# Patient Record
Sex: FEMALE | Race: WHITE | NOT HISPANIC OR LATINO | Employment: FULL TIME | URBAN - METROPOLITAN AREA
[De-identification: names, ages, dates, MRNs, and addresses within clinical notes are randomized per-mention and may not be internally consistent; named-entity substitution may affect disease eponyms.]

---

## 2017-01-26 ENCOUNTER — AMBULATORY - HEALTHEAST (OUTPATIENT)
Dept: ALLERGY | Facility: CLINIC | Age: 52
End: 2017-01-26

## 2017-01-26 DIAGNOSIS — J30.1 SEASONAL ALLERGIC RHINITIS DUE TO POLLEN: ICD-10-CM

## 2017-01-26 DIAGNOSIS — J30.81 ALLERGIC RHINITIS DUE TO ANIMAL (CAT) (DOG) HAIR AND DANDER: ICD-10-CM

## 2017-02-16 ENCOUNTER — COMMUNICATION - HEALTHEAST (OUTPATIENT)
Dept: ALLERGY | Facility: CLINIC | Age: 52
End: 2017-02-16

## 2017-02-16 ENCOUNTER — AMBULATORY - HEALTHEAST (OUTPATIENT)
Dept: ALLERGY | Facility: CLINIC | Age: 52
End: 2017-02-16

## 2017-02-16 DIAGNOSIS — J30.1 SEASONAL ALLERGIC RHINITIS DUE TO POLLEN: ICD-10-CM

## 2017-02-16 DIAGNOSIS — J30.9 ALLERGIC RHINITIS: ICD-10-CM

## 2017-03-09 ENCOUNTER — AMBULATORY - HEALTHEAST (OUTPATIENT)
Dept: ALLERGY | Facility: CLINIC | Age: 52
End: 2017-03-09

## 2017-03-09 DIAGNOSIS — J30.89 SEASONAL ALLERGIC RHINITIS DUE TO OTHER ALLERGIC TRIGGER: ICD-10-CM

## 2017-03-15 ENCOUNTER — COMMUNICATION - HEALTHEAST (OUTPATIENT)
Dept: FAMILY MEDICINE | Facility: CLINIC | Age: 52
End: 2017-03-15

## 2017-03-15 ENCOUNTER — COMMUNICATION - HEALTHEAST (OUTPATIENT)
Dept: ALLERGY | Facility: CLINIC | Age: 52
End: 2017-03-15

## 2017-03-15 DIAGNOSIS — J30.9 ALLERGIC RHINITIS: ICD-10-CM

## 2017-03-17 ENCOUNTER — COMMUNICATION - HEALTHEAST (OUTPATIENT)
Dept: SCHEDULING | Facility: CLINIC | Age: 52
End: 2017-03-17

## 2017-03-17 ENCOUNTER — OFFICE VISIT - HEALTHEAST (OUTPATIENT)
Dept: INTERNAL MEDICINE | Facility: CLINIC | Age: 52
End: 2017-03-17

## 2017-03-17 DIAGNOSIS — R50.9 FEVER: ICD-10-CM

## 2017-03-17 DIAGNOSIS — J10.1 INFLUENZA B: ICD-10-CM

## 2017-03-17 DIAGNOSIS — J45.909 ASTHMA: ICD-10-CM

## 2017-03-30 ENCOUNTER — AMBULATORY - HEALTHEAST (OUTPATIENT)
Dept: ALLERGY | Facility: CLINIC | Age: 52
End: 2017-03-30

## 2017-03-30 DIAGNOSIS — J30.81 ALLERGIC RHINITIS DUE TO ANIMAL (CAT) (DOG) HAIR AND DANDER: ICD-10-CM

## 2017-04-25 ENCOUNTER — AMBULATORY - HEALTHEAST (OUTPATIENT)
Dept: ALLERGY | Facility: CLINIC | Age: 52
End: 2017-04-25

## 2017-04-25 DIAGNOSIS — J30.1 SEASONAL ALLERGIC RHINITIS DUE TO POLLEN: ICD-10-CM

## 2017-04-25 DIAGNOSIS — J30.81 ALLERGIC RHINITIS DUE TO ANIMAL (CAT) (DOG) HAIR AND DANDER: ICD-10-CM

## 2017-05-09 ENCOUNTER — COMMUNICATION - HEALTHEAST (OUTPATIENT)
Dept: ALLERGY | Facility: CLINIC | Age: 52
End: 2017-05-09

## 2017-05-09 DIAGNOSIS — J30.9 ALLERGIC RHINITIS: ICD-10-CM

## 2017-05-18 ENCOUNTER — AMBULATORY - HEALTHEAST (OUTPATIENT)
Dept: ALLERGY | Facility: CLINIC | Age: 52
End: 2017-05-18

## 2017-05-18 DIAGNOSIS — J30.81 ALLERGIC RHINITIS DUE TO ANIMAL (CAT) (DOG) HAIR AND DANDER: ICD-10-CM

## 2017-05-18 DIAGNOSIS — J30.1 SEASONAL ALLERGIC RHINITIS DUE TO POLLEN: ICD-10-CM

## 2017-06-13 ENCOUNTER — AMBULATORY - HEALTHEAST (OUTPATIENT)
Dept: ALLERGY | Facility: CLINIC | Age: 52
End: 2017-06-13

## 2017-06-13 DIAGNOSIS — J30.89 SEASONAL ALLERGIC RHINITIS DUE TO OTHER ALLERGIC TRIGGER: ICD-10-CM

## 2017-06-13 DIAGNOSIS — J30.1 SEASONAL ALLERGIC RHINITIS DUE TO POLLEN: ICD-10-CM

## 2017-06-13 DIAGNOSIS — J30.81 ALLERGIC RHINITIS DUE TO ANIMAL (CAT) (DOG) HAIR AND DANDER: ICD-10-CM

## 2017-07-11 ENCOUNTER — AMBULATORY - HEALTHEAST (OUTPATIENT)
Dept: ALLERGY | Facility: CLINIC | Age: 52
End: 2017-07-11

## 2017-07-11 DIAGNOSIS — J30.89 SEASONAL ALLERGIC RHINITIS DUE TO OTHER ALLERGIC TRIGGER: ICD-10-CM

## 2017-07-25 ENCOUNTER — AMBULATORY - HEALTHEAST (OUTPATIENT)
Dept: ALLERGY | Facility: CLINIC | Age: 52
End: 2017-07-25

## 2017-07-25 DIAGNOSIS — J30.81 ALLERGIC RHINITIS DUE TO ANIMAL (CAT) (DOG) HAIR AND DANDER: ICD-10-CM

## 2017-07-25 DIAGNOSIS — J30.89 SEASONAL ALLERGIC RHINITIS DUE TO OTHER ALLERGIC TRIGGER: ICD-10-CM

## 2017-07-25 DIAGNOSIS — J30.1 SEASONAL ALLERGIC RHINITIS DUE TO POLLEN: ICD-10-CM

## 2017-08-03 ENCOUNTER — COMMUNICATION - HEALTHEAST (OUTPATIENT)
Dept: SCHEDULING | Facility: CLINIC | Age: 52
End: 2017-08-03

## 2017-08-09 ENCOUNTER — OFFICE VISIT - HEALTHEAST (OUTPATIENT)
Dept: FAMILY MEDICINE | Facility: CLINIC | Age: 52
End: 2017-08-09

## 2017-08-09 DIAGNOSIS — K21.9 ESOPHAGEAL REFLUX: ICD-10-CM

## 2017-08-09 DIAGNOSIS — R07.89 ATYPICAL CHEST PAIN: ICD-10-CM

## 2017-08-09 DIAGNOSIS — Z12.31 VISIT FOR SCREENING MAMMOGRAM: ICD-10-CM

## 2017-08-09 DIAGNOSIS — R53.83 FATIGUE: ICD-10-CM

## 2017-08-09 DIAGNOSIS — J45.909 ASTHMA: ICD-10-CM

## 2017-08-09 LAB
ATRIAL RATE - MUSE: 76 BPM
CHOLEST SERPL-MCNC: 232 MG/DL
DIASTOLIC BLOOD PRESSURE - MUSE: NORMAL MMHG
FASTING STATUS PATIENT QL REPORTED: NO
HDLC SERPL-MCNC: 60 MG/DL
INTERPRETATION ECG - MUSE: NORMAL
LDLC SERPL CALC-MCNC: 131 MG/DL
P AXIS - MUSE: 54 DEGREES
PR INTERVAL - MUSE: 142 MS
QRS DURATION - MUSE: 82 MS
QT - MUSE: 396 MS
QTC - MUSE: 445 MS
R AXIS - MUSE: 18 DEGREES
SYSTOLIC BLOOD PRESSURE - MUSE: NORMAL MMHG
T AXIS - MUSE: 33 DEGREES
TRIGL SERPL-MCNC: 203 MG/DL
VENTRICULAR RATE- MUSE: 76 BPM

## 2017-08-17 ENCOUNTER — AMBULATORY - HEALTHEAST (OUTPATIENT)
Dept: ALLERGY | Facility: CLINIC | Age: 52
End: 2017-08-17

## 2017-08-17 DIAGNOSIS — J30.1 SEASONAL ALLERGIC RHINITIS DUE TO POLLEN: ICD-10-CM

## 2017-08-17 DIAGNOSIS — J30.81 ALLERGIC RHINITIS DUE TO ANIMAL (CAT) (DOG) HAIR AND DANDER: ICD-10-CM

## 2017-08-18 ENCOUNTER — HOSPITAL ENCOUNTER (OUTPATIENT)
Dept: MAMMOGRAPHY | Facility: HOSPITAL | Age: 52
Discharge: HOME | End: 2017-08-18
Attending: FAMILY MEDICINE

## 2017-08-18 DIAGNOSIS — Z12.31 VISIT FOR SCREENING MAMMOGRAM: ICD-10-CM

## 2017-08-21 ENCOUNTER — COMMUNICATION - HEALTHEAST (OUTPATIENT)
Dept: FAMILY MEDICINE | Facility: CLINIC | Age: 52
End: 2017-08-21

## 2017-08-21 DIAGNOSIS — R07.9 CHEST PAIN: ICD-10-CM

## 2017-08-29 ENCOUNTER — HOSPITAL ENCOUNTER (OUTPATIENT)
Dept: NUCLEAR MEDICINE | Facility: HOSPITAL | Age: 52
Discharge: HOME | End: 2017-08-29
Attending: FAMILY MEDICINE

## 2017-08-29 ENCOUNTER — HOSPITAL ENCOUNTER (OUTPATIENT)
Dept: CARDIOLOGY | Facility: HOSPITAL | Age: 52
Discharge: HOME | End: 2017-08-29
Attending: FAMILY MEDICINE

## 2017-08-29 DIAGNOSIS — R07.9 CHEST PAIN: ICD-10-CM

## 2017-08-29 LAB
CV STRESS CURRENT BP HE: NORMAL
CV STRESS CURRENT HR HE: 102
CV STRESS CURRENT HR HE: 106
CV STRESS CURRENT HR HE: 114
CV STRESS CURRENT HR HE: 116
CV STRESS CURRENT HR HE: 117
CV STRESS CURRENT HR HE: 118
CV STRESS CURRENT HR HE: 125
CV STRESS CURRENT HR HE: 129
CV STRESS CURRENT HR HE: 129
CV STRESS CURRENT HR HE: 136
CV STRESS CURRENT HR HE: 136
CV STRESS CURRENT HR HE: 140
CV STRESS CURRENT HR HE: 141
CV STRESS CURRENT HR HE: 144
CV STRESS CURRENT HR HE: 145
CV STRESS CURRENT HR HE: 149
CV STRESS CURRENT HR HE: 152
CV STRESS CURRENT HR HE: 153
CV STRESS CURRENT HR HE: 85
CV STRESS CURRENT HR HE: 92
CV STRESS CURRENT HR HE: 94
CV STRESS CURRENT HR HE: 96
CV STRESS CURRENT HR HE: 96
CV STRESS CURRENT HR HE: 97
CV STRESS DEVIATION TIME HE: NORMAL
CV STRESS ECHO PERCENT HR HE: NORMAL
CV STRESS EXERCISE STAGE HE: NORMAL
CV STRESS EXERCISE STAGE REACHED HE: NORMAL
CV STRESS FINAL RESTING BP HE: NORMAL
CV STRESS FINAL RESTING HR HE: 96
CV STRESS MAX HR HE: 152
CV STRESS MAX TREADMILL GRADE HE: 14
CV STRESS MAX TREADMILL SPEED HE: 3.4
CV STRESS PEAK DIA BP HE: NORMAL
CV STRESS PEAK SYS BP HE: NORMAL
CV STRESS PHASE HE: NORMAL
CV STRESS PROTOCOL HE: NORMAL
CV STRESS RESTING PT POSITION HE: NORMAL
CV STRESS RESTING PT POSITION HE: NORMAL
CV STRESS ST DEVIATION AMOUNT HE: NORMAL
CV STRESS ST DEVIATION ELEVATION HE: NORMAL
CV STRESS ST EVELATION AMOUNT HE: NORMAL
CV STRESS TEST TYPE HE: NORMAL
CV STRESS TOTAL STAGE TIME MIN 1 HE: NORMAL
NUC STRESS EJECTION FRACTION: 70 %
STRESS ECHO BASELINE BP: NORMAL
STRESS ECHO BASELINE HR: 85
STRESS ECHO CALCULATED PERCENT HR: 89 %
STRESS ECHO LAST STRESS BP: NORMAL
STRESS ECHO LAST STRESS HR: 152
STRESS ECHO POST ESTIMATED WORKLOAD: 10.3
STRESS ECHO POST EXERCISE DUR MIN: 8
STRESS ECHO POST EXERCISE DUR SEC: 59
STRESS ECHO TARGET HR: 145

## 2017-08-29 ASSESSMENT — MIFFLIN-ST. JEOR: SCORE: 1451.84

## 2017-08-30 ENCOUNTER — COMMUNICATION - HEALTHEAST (OUTPATIENT)
Dept: FAMILY MEDICINE | Facility: CLINIC | Age: 52
End: 2017-08-30

## 2017-08-30 DIAGNOSIS — R10.13 EPIGASTRIC ABDOMINAL PAIN: ICD-10-CM

## 2017-08-30 DIAGNOSIS — R07.9 CHEST PAIN: ICD-10-CM

## 2017-08-30 DIAGNOSIS — K21.9 GERD (GASTROESOPHAGEAL REFLUX DISEASE): ICD-10-CM

## 2017-09-12 ENCOUNTER — AMBULATORY - HEALTHEAST (OUTPATIENT)
Dept: ALLERGY | Facility: CLINIC | Age: 52
End: 2017-09-12

## 2017-09-12 DIAGNOSIS — J30.1 SEASONAL ALLERGIC RHINITIS DUE TO POLLEN: ICD-10-CM

## 2017-09-12 DIAGNOSIS — J30.81 ALLERGIC RHINITIS DUE TO ANIMAL (CAT) (DOG) HAIR AND DANDER: ICD-10-CM

## 2017-09-28 ENCOUNTER — COMMUNICATION - HEALTHEAST (OUTPATIENT)
Dept: FAMILY MEDICINE | Facility: CLINIC | Age: 52
End: 2017-09-28

## 2017-09-28 DIAGNOSIS — J45.20 MILD INTERMITTENT ASTHMA: ICD-10-CM

## 2017-10-05 ENCOUNTER — AMBULATORY - HEALTHEAST (OUTPATIENT)
Dept: ALLERGY | Facility: CLINIC | Age: 52
End: 2017-10-05

## 2017-10-05 DIAGNOSIS — J30.1 CHRONIC SEASONAL ALLERGIC RHINITIS DUE TO POLLEN: ICD-10-CM

## 2017-10-05 DIAGNOSIS — J30.81 CHRONIC ALLERGIC RHINITIS DUE TO ANIMAL HAIR AND DANDER: ICD-10-CM

## 2017-10-09 ENCOUNTER — COMMUNICATION - HEALTHEAST (OUTPATIENT)
Dept: FAMILY MEDICINE | Facility: CLINIC | Age: 52
End: 2017-10-09

## 2017-10-09 DIAGNOSIS — K21.9 GERD (GASTROESOPHAGEAL REFLUX DISEASE): ICD-10-CM

## 2017-10-09 DIAGNOSIS — E66.9 OBESITY (BMI 35.0-39.9 WITHOUT COMORBIDITY): ICD-10-CM

## 2017-10-31 ENCOUNTER — AMBULATORY - HEALTHEAST (OUTPATIENT)
Dept: ALLERGY | Facility: CLINIC | Age: 52
End: 2017-10-31

## 2017-10-31 DIAGNOSIS — J30.9 ALLERGIC RHINITIS: ICD-10-CM

## 2017-11-16 ENCOUNTER — OFFICE VISIT - HEALTHEAST (OUTPATIENT)
Dept: ALLERGY | Facility: CLINIC | Age: 52
End: 2017-11-16

## 2017-11-16 DIAGNOSIS — J30.1 CHRONIC SEASONAL ALLERGIC RHINITIS DUE TO POLLEN: ICD-10-CM

## 2017-11-16 DIAGNOSIS — L50.9 URTICARIA: ICD-10-CM

## 2017-11-16 DIAGNOSIS — J45.20 MILD INTERMITTENT ASTHMA WITHOUT COMPLICATION: ICD-10-CM

## 2018-01-03 ENCOUNTER — COMMUNICATION - HEALTHEAST (OUTPATIENT)
Dept: FAMILY MEDICINE | Facility: CLINIC | Age: 53
End: 2018-01-03

## 2018-01-03 ENCOUNTER — COMMUNICATION - HEALTHEAST (OUTPATIENT)
Dept: SCHEDULING | Facility: CLINIC | Age: 53
End: 2018-01-03

## 2018-01-03 DIAGNOSIS — J45.909 ASTHMA: ICD-10-CM

## 2018-01-04 ENCOUNTER — COMMUNICATION - HEALTHEAST (OUTPATIENT)
Dept: SCHEDULING | Facility: CLINIC | Age: 53
End: 2018-01-04

## 2018-01-04 ENCOUNTER — OFFICE VISIT - HEALTHEAST (OUTPATIENT)
Dept: FAMILY MEDICINE | Facility: CLINIC | Age: 53
End: 2018-01-04

## 2018-01-04 DIAGNOSIS — J45.909 ASTHMA: ICD-10-CM

## 2018-01-04 DIAGNOSIS — J06.9 VIRAL URI WITH COUGH: ICD-10-CM

## 2018-01-04 ASSESSMENT — MIFFLIN-ST. JEOR: SCORE: 1456.37

## 2018-01-09 ENCOUNTER — COMMUNICATION - HEALTHEAST (OUTPATIENT)
Dept: FAMILY MEDICINE | Facility: CLINIC | Age: 53
End: 2018-01-09

## 2018-01-09 DIAGNOSIS — K21.9 GERD (GASTROESOPHAGEAL REFLUX DISEASE): ICD-10-CM

## 2018-02-21 ENCOUNTER — COMMUNICATION - HEALTHEAST (OUTPATIENT)
Dept: FAMILY MEDICINE | Facility: CLINIC | Age: 53
End: 2018-02-21

## 2018-02-23 ENCOUNTER — OFFICE VISIT - HEALTHEAST (OUTPATIENT)
Dept: FAMILY MEDICINE | Facility: CLINIC | Age: 53
End: 2018-02-23

## 2018-02-23 DIAGNOSIS — R05.9 COUGH: ICD-10-CM

## 2018-02-23 DIAGNOSIS — K44.9 HIATAL HERNIA: ICD-10-CM

## 2018-02-23 DIAGNOSIS — J45.901 ASTHMA EXACERBATION: ICD-10-CM

## 2018-03-19 ENCOUNTER — HOSPITAL ENCOUNTER (OUTPATIENT)
Dept: NUTRITION | Facility: HOSPITAL | Age: 53
Discharge: HOME | End: 2018-03-19
Attending: FAMILY MEDICINE

## 2018-03-19 DIAGNOSIS — E66.9 OBESITY (BMI 35.0-39.9 WITHOUT COMORBIDITY): ICD-10-CM

## 2018-03-19 DIAGNOSIS — K21.9 GERD (GASTROESOPHAGEAL REFLUX DISEASE): ICD-10-CM

## 2018-04-09 ENCOUNTER — COMMUNICATION - HEALTHEAST (OUTPATIENT)
Dept: FAMILY MEDICINE | Facility: CLINIC | Age: 53
End: 2018-04-09

## 2018-04-09 DIAGNOSIS — K21.9 GERD (GASTROESOPHAGEAL REFLUX DISEASE): ICD-10-CM

## 2018-04-20 ENCOUNTER — COMMUNICATION - HEALTHEAST (OUTPATIENT)
Dept: FAMILY MEDICINE | Facility: CLINIC | Age: 53
End: 2018-04-20

## 2018-04-20 DIAGNOSIS — K21.9 GERD (GASTROESOPHAGEAL REFLUX DISEASE): ICD-10-CM

## 2018-04-20 DIAGNOSIS — K44.9 HIATAL HERNIA: ICD-10-CM

## 2018-05-03 ENCOUNTER — RECORDS - HEALTHEAST (OUTPATIENT)
Dept: ADMINISTRATIVE | Facility: OTHER | Age: 53
End: 2018-05-03

## 2018-07-07 ENCOUNTER — RECORDS - HEALTHEAST (OUTPATIENT)
Dept: ADMINISTRATIVE | Facility: OTHER | Age: 53
End: 2018-07-07

## 2018-07-16 ENCOUNTER — COMMUNICATION - HEALTHEAST (OUTPATIENT)
Dept: FAMILY MEDICINE | Facility: CLINIC | Age: 53
End: 2018-07-16

## 2018-07-19 ENCOUNTER — OFFICE VISIT - HEALTHEAST (OUTPATIENT)
Dept: FAMILY MEDICINE | Facility: CLINIC | Age: 53
End: 2018-07-19

## 2018-07-19 DIAGNOSIS — H10.45 OTHER CHRONIC ALLERGIC CONJUNCTIVITIS: ICD-10-CM

## 2018-07-19 DIAGNOSIS — Z01.818 PREOPERATIVE CLEARANCE: ICD-10-CM

## 2018-07-19 DIAGNOSIS — J45.20 MILD INTERMITTENT ASTHMA WITHOUT COMPLICATION: ICD-10-CM

## 2018-07-19 DIAGNOSIS — J30.9 ALLERGIC RHINITIS: ICD-10-CM

## 2018-07-19 DIAGNOSIS — K21.9 GERD (GASTROESOPHAGEAL REFLUX DISEASE): ICD-10-CM

## 2018-07-19 DIAGNOSIS — K44.9 PARAESOPHAGEAL HERNIA: ICD-10-CM

## 2018-07-19 LAB
ANION GAP SERPL CALCULATED.3IONS-SCNC: 9 MMOL/L (ref 5–18)
ATRIAL RATE - MUSE: 72 BPM
BUN SERPL-MCNC: 17 MG/DL (ref 8–22)
CALCIUM SERPL-MCNC: 9.8 MG/DL (ref 8.5–10.5)
CHLORIDE BLD-SCNC: 107 MMOL/L (ref 98–107)
CO2 SERPL-SCNC: 25 MMOL/L (ref 22–31)
CREAT SERPL-MCNC: 0.77 MG/DL (ref 0.6–1.1)
DIASTOLIC BLOOD PRESSURE - MUSE: NORMAL MMHG
ERYTHROCYTE [DISTWIDTH] IN BLOOD BY AUTOMATED COUNT: 15.8 % (ref 11–14.5)
GFR SERPL CREATININE-BSD FRML MDRD: >60 ML/MIN/1.73M2
GLUCOSE BLD-MCNC: 107 MG/DL (ref 70–125)
HCT VFR BLD AUTO: 35 % (ref 35–47)
HGB BLD-MCNC: 11.6 G/DL (ref 12–16)
INTERPRETATION ECG - MUSE: NORMAL
MCH RBC QN AUTO: 26.6 PG (ref 27–34)
MCHC RBC AUTO-ENTMCNC: 33.2 G/DL (ref 32–36)
MCV RBC AUTO: 80 FL (ref 80–100)
P AXIS - MUSE: 34 DEGREES
PLATELET # BLD AUTO: 322 THOU/UL (ref 140–440)
PMV BLD AUTO: 7.6 FL (ref 7–10)
POTASSIUM BLD-SCNC: 4.4 MMOL/L (ref 3.5–5)
PR INTERVAL - MUSE: 146 MS
QRS DURATION - MUSE: 80 MS
QT - MUSE: 394 MS
QTC - MUSE: 431 MS
R AXIS - MUSE: 21 DEGREES
RBC # BLD AUTO: 4.37 MILL/UL (ref 3.8–5.4)
SODIUM SERPL-SCNC: 141 MMOL/L (ref 136–145)
SYSTOLIC BLOOD PRESSURE - MUSE: NORMAL MMHG
T AXIS - MUSE: 30 DEGREES
VENTRICULAR RATE- MUSE: 72 BPM
WBC: 5.1 THOU/UL (ref 4–11)

## 2018-07-19 ASSESSMENT — MIFFLIN-ST. JEOR: SCORE: 1462.33

## 2018-07-26 ENCOUNTER — RECORDS - HEALTHEAST (OUTPATIENT)
Dept: ADMINISTRATIVE | Facility: OTHER | Age: 53
End: 2018-07-26

## 2018-08-01 ENCOUNTER — COMMUNICATION - HEALTHEAST (OUTPATIENT)
Dept: FAMILY MEDICINE | Facility: CLINIC | Age: 53
End: 2018-08-01

## 2018-09-30 ENCOUNTER — COMMUNICATION - HEALTHEAST (OUTPATIENT)
Dept: FAMILY MEDICINE | Facility: CLINIC | Age: 53
End: 2018-09-30

## 2018-09-30 DIAGNOSIS — J45.20 MILD INTERMITTENT ASTHMA: ICD-10-CM

## 2019-02-13 ENCOUNTER — OFFICE VISIT - HEALTHEAST (OUTPATIENT)
Dept: FAMILY MEDICINE | Facility: CLINIC | Age: 54
End: 2019-02-13

## 2019-02-13 DIAGNOSIS — J45.20 MILD INTERMITTENT ASTHMA WITHOUT COMPLICATION: ICD-10-CM

## 2019-02-13 DIAGNOSIS — Z71.84 COUNSELING ABOUT TRAVEL: ICD-10-CM

## 2019-02-13 DIAGNOSIS — Z00.00 HEALTH CARE MAINTENANCE: ICD-10-CM

## 2019-02-14 LAB
HBV SURFACE AB SERPL IA-ACNC: NEGATIVE M[IU]/ML
RUBV IGG SERPL QL IA: POSITIVE

## 2019-02-15 ENCOUNTER — COMMUNICATION - HEALTHEAST (OUTPATIENT)
Dept: FAMILY MEDICINE | Facility: CLINIC | Age: 54
End: 2019-02-15

## 2019-02-15 LAB
MEV IGG SER IA-ACNC: POSITIVE
MUV IGG SER QL IA: POSITIVE

## 2019-02-19 ENCOUNTER — AMBULATORY - HEALTHEAST (OUTPATIENT)
Dept: NURSING | Facility: CLINIC | Age: 54
End: 2019-02-19

## 2019-09-17 ENCOUNTER — COMMUNICATION - HEALTHEAST (OUTPATIENT)
Dept: FAMILY MEDICINE | Facility: CLINIC | Age: 54
End: 2019-09-17

## 2019-09-17 DIAGNOSIS — J45.20 MILD INTERMITTENT ASTHMA: ICD-10-CM

## 2020-01-07 ENCOUNTER — COMMUNICATION - HEALTHEAST (OUTPATIENT)
Dept: ALLERGY | Facility: CLINIC | Age: 55
End: 2020-01-07

## 2020-01-07 DIAGNOSIS — J30.9 ALLERGIC RHINITIS: ICD-10-CM

## 2020-01-13 ENCOUNTER — COMMUNICATION - HEALTHEAST (OUTPATIENT)
Dept: FAMILY MEDICINE | Facility: CLINIC | Age: 55
End: 2020-01-13

## 2020-01-13 DIAGNOSIS — J30.9 ALLERGIC RHINITIS: ICD-10-CM

## 2020-03-29 ENCOUNTER — COMMUNICATION - HEALTHEAST (OUTPATIENT)
Dept: FAMILY MEDICINE | Facility: CLINIC | Age: 55
End: 2020-03-29

## 2020-03-29 DIAGNOSIS — J45.909 ASTHMA: ICD-10-CM

## 2020-03-30 RX ORDER — ALBUTEROL SULFATE 0.83 MG/ML
2.5 SOLUTION RESPIRATORY (INHALATION) EVERY 6 HOURS PRN
Qty: 60 VIAL | Refills: 1 | Status: SHIPPED | OUTPATIENT
Start: 2020-03-30 | End: 2023-07-21

## 2020-06-07 ENCOUNTER — OFFICE VISIT - HEALTHEAST (OUTPATIENT)
Dept: FAMILY MEDICINE | Facility: CLINIC | Age: 55
End: 2020-06-07

## 2020-06-07 DIAGNOSIS — N39.0 ACUTE UTI (URINARY TRACT INFECTION): ICD-10-CM

## 2020-10-14 ENCOUNTER — OFFICE VISIT - HEALTHEAST (OUTPATIENT)
Dept: FAMILY MEDICINE | Facility: CLINIC | Age: 55
End: 2020-10-14

## 2020-10-14 DIAGNOSIS — N39.0 ACUTE UTI (URINARY TRACT INFECTION): ICD-10-CM

## 2020-10-21 ENCOUNTER — VIRTUAL VISIT (OUTPATIENT)
Dept: FAMILY MEDICINE | Facility: OTHER | Age: 55
End: 2020-10-21

## 2020-10-21 ENCOUNTER — COMMUNICATION - HEALTHEAST (OUTPATIENT)
Dept: FAMILY MEDICINE | Facility: CLINIC | Age: 55
End: 2020-10-21

## 2020-10-22 ENCOUNTER — COMMUNICATION - HEALTHEAST (OUTPATIENT)
Dept: FAMILY MEDICINE | Facility: CLINIC | Age: 55
End: 2020-10-22

## 2020-10-22 ENCOUNTER — OFFICE VISIT - HEALTHEAST (OUTPATIENT)
Dept: FAMILY MEDICINE | Facility: CLINIC | Age: 55
End: 2020-10-22

## 2020-10-22 DIAGNOSIS — J45.20 MILD INTERMITTENT ASTHMA WITHOUT COMPLICATION: ICD-10-CM

## 2020-10-22 DIAGNOSIS — Z20.822 SUSPECTED COVID-19 VIRUS INFECTION: ICD-10-CM

## 2020-10-23 ENCOUNTER — AMBULATORY - HEALTHEAST (OUTPATIENT)
Dept: FAMILY MEDICINE | Facility: CLINIC | Age: 55
End: 2020-10-23

## 2020-10-23 DIAGNOSIS — Z20.822 SUSPECTED COVID-19 VIRUS INFECTION: ICD-10-CM

## 2020-10-24 ENCOUNTER — COMMUNICATION - HEALTHEAST (OUTPATIENT)
Dept: SCHEDULING | Facility: CLINIC | Age: 55
End: 2020-10-24

## 2020-12-17 ENCOUNTER — COMMUNICATION - HEALTHEAST (OUTPATIENT)
Dept: FAMILY MEDICINE | Facility: CLINIC | Age: 55
End: 2020-12-17

## 2020-12-17 DIAGNOSIS — J45.20 MILD INTERMITTENT ASTHMA: ICD-10-CM

## 2020-12-18 RX ORDER — MONTELUKAST SODIUM 10 MG/1
TABLET ORAL
Qty: 90 TABLET | Refills: 3 | Status: SHIPPED | OUTPATIENT
Start: 2020-12-18 | End: 2021-12-07

## 2021-02-16 ENCOUNTER — COMMUNICATION - HEALTHEAST (OUTPATIENT)
Dept: FAMILY MEDICINE | Facility: CLINIC | Age: 56
End: 2021-02-16

## 2021-02-16 DIAGNOSIS — J30.9 ALLERGIC RHINITIS: ICD-10-CM

## 2021-02-16 RX ORDER — OLOPATADINE HYDROCHLORIDE 1 MG/ML
SOLUTION/ DROPS OPHTHALMIC
Qty: 5 ML | Refills: 1 | Status: SHIPPED | OUTPATIENT
Start: 2021-02-16

## 2021-03-12 ENCOUNTER — COMMUNICATION - HEALTHEAST (OUTPATIENT)
Dept: FAMILY MEDICINE | Facility: CLINIC | Age: 56
End: 2021-03-12

## 2021-04-19 ENCOUNTER — COMMUNICATION - HEALTHEAST (OUTPATIENT)
Dept: FAMILY MEDICINE | Facility: CLINIC | Age: 56
End: 2021-04-19

## 2021-05-24 ENCOUNTER — RECORDS - HEALTHEAST (OUTPATIENT)
Dept: ADMINISTRATIVE | Facility: CLINIC | Age: 56
End: 2021-05-24

## 2021-05-30 VITALS — BODY MASS INDEX: 35.25 KG/M2 | WEIGHT: 199 LBS

## 2021-05-31 VITALS — WEIGHT: 198 LBS | BODY MASS INDEX: 36.21 KG/M2

## 2021-05-31 VITALS — HEIGHT: 62 IN | WEIGHT: 197 LBS | BODY MASS INDEX: 36.25 KG/M2

## 2021-05-31 VITALS — HEIGHT: 62 IN | WEIGHT: 198 LBS | BODY MASS INDEX: 36.44 KG/M2

## 2021-05-31 VITALS — BODY MASS INDEX: 34.94 KG/M2 | WEIGHT: 197.25 LBS

## 2021-06-01 VITALS — WEIGHT: 199.31 LBS | BODY MASS INDEX: 36.68 KG/M2 | HEIGHT: 62 IN

## 2021-06-01 VITALS — BODY MASS INDEX: 36.29 KG/M2 | WEIGHT: 198.44 LBS

## 2021-06-01 NOTE — TELEPHONE ENCOUNTER
RN cannot approve Refill Request    RN can NOT refill this medication med is not covered by policy/route to provider     . Last office visit: 2/13/2019 Sarah Santiago MD Last Physical: 7/19/2018 Last MTM visit: Visit date not found Last visit same specialty: 2/13/2019 Sarah Santiago MD.  Next visit within 3 mo: Visit date not found  Next physical within 3 mo: Visit date not found      Patsy Ibarra, [facility] Triage/Med Refill 9/17/2019    Requested Prescriptions   Pending Prescriptions Disp Refills     montelukast (SINGULAIR) 10 mg tablet [Pharmacy Med Name: MONTELUKAST SOD 10 MG TABLET] 90 tablet 3     Sig: TAKE ONE TABLET BY MOUTH ONE TIME DAILY       There is no refill protocol information for this order

## 2021-06-02 VITALS — BODY MASS INDEX: 36.44 KG/M2 | WEIGHT: 199.25 LBS

## 2021-06-08 ENCOUNTER — COMMUNICATION - HEALTHEAST (OUTPATIENT)
Dept: FAMILY MEDICINE | Facility: CLINIC | Age: 56
End: 2021-06-08

## 2021-06-08 DIAGNOSIS — J45.909 ASTHMA: ICD-10-CM

## 2021-06-09 RX ORDER — ALBUTEROL SULFATE 90 UG/1
AEROSOL, METERED RESPIRATORY (INHALATION)
Qty: 8.5 G | Refills: 5 | Status: SHIPPED | OUTPATIENT
Start: 2021-06-09 | End: 2023-07-21

## 2021-06-10 NOTE — PROGRESS NOTES
RW  1:1 V/V EXP 5/18/2018  POLLENS/CAT  1:1 V/V EXP 5/18/2018  TREES  1:1 V/V EXP 5/18/2018    HE BULMAROW  CHECKED BY [place]  CHARGED 30 UNITS

## 2021-06-12 NOTE — PROGRESS NOTES
"Jocelyn Quiroga is a 55 y.o. female who is being evaluated via a billable telephone visit.      The patient has been notified of following:     \"This telephone visit will be conducted via a call between you and your physician/provider. We have found that certain health care needs can be provided without the need for a physical exam.  This service lets us provide the care you need with a short phone conversation.  If a prescription is necessary we can send it directly to your pharmacy.  If lab work is needed we can place an order for that and you can then stop by our lab to have the test done at a later time.    Telephone visits are billed at different rates depending on your insurance coverage. During this emergency period, for some insurers they may be billed the same as an in-person visit.  Please reach out to your insurance provider with any questions.    If during the course of the call the physician/provider feels a telephone visit is not appropriate, you will not be charged for this service.\"    Patient has given verbal consent to a Telephone visit? Yes    What phone number would you like to be contacted at? [phone]    Patient would like to receive their AVS by AVS Preference: Phil.    Additional provider notes:   Assessment/Plan:    1. Suspected COVID-19 virus infection  2.  Mild intermittent asthma without complication  Symptoms highly suspicious for COVID-19 infection, recommend testing at this time and patient agrees.  Order placed today, discussed patient will receive phone call to schedule test at [facility] test site.  We discussed quarantining until test result returns.  We discussed maintaining adequate hydration, using Tylenol primarily as needed.  We discussed signs/symptoms that would warrant ER evaluation.  Patient voices understanding and agreement with the plan.  Given hx asthma, will also check for influenza.  - Symptomatic COVID-19 Virus (CORONAVIRUS) PCR; Future      Phone call " duration:  11 minutes    Follow up: As needed    Kristina Hernández MD  [facility]    Subjective:    Patient ID: Jocelyn Quiroga is a 55 y.o. female had telephone visit today for acute illness    Acute illness  -Patient reports traveling to [place] last Thursday to help move her grandma from independent living to assisted living, she flew via airplane  -She reports being careful while she was there, wearing mask and staying distanced from people  -She also reports that her temperature was checked daily and they were screened for Covid symptoms  -They came home on Tuesday, when they got home she developed headache, body aches, feeling hot and flushed  -She also reports fever up to 102F and chills and cough  -She has been using Advil/ibuprofen and Tylenol intermittently to help with fever and other symptoms  -This morning her temperature finally went below 100F  -To her knowledge she was not exposed to anyone who tested positive for who was symptomatic  -She does have a history of asthma and has used her inhaler a few times -she does not report wheezing or shortness of breath  -She reports checking her oxygen level which was 92%      Exam:  General: Answering questions appropriately, linear thought process, does not sound short of breath, no cough heard    Patient Active Problem List   Diagnosis     Vitamin D Deficiency     Fatigue     Allergic Rhinitis     Urticaria     Chronic Allergic Conjunctivitis     Mild intermittent asthma without complication     Allergic Rhinitis Due To Pollen     Allergic Rhinitis Due To Animals     Joint Pain, Localized In The Left Shoulder     Joint Pain, Localized In The Knee     GERD (gastroesophageal reflux disease)     Past Medical History:   Diagnosis Date     Arthritis Jan 1995    not Rheumatoid- occurred during first pregnancy     Asthma     environmental and viral induced     GERD (gastroesophageal reflux disease)      Heart murmur birth    been assessed and  deemed not an issue     Past Surgical History:   Procedure Laterality Date     CYST REMOVAL      on finger     WISDOM TOOTH EXTRACTION       Current Outpatient Medications on File Prior to Visit   Medication Sig Dispense Refill     albuterol (PROAIR HFA) 90 mcg/actuation inhaler INHALE 2 PUFFS EVERY 4 HOURS AS NEEDED 1 Inhaler 6     albuterol (PROVENTIL) 2.5 mg /3 mL (0.083 %) nebulizer solution Take 3 mL (2.5 mg total) by nebulization every 6 (six) hours as needed for wheezing. 60 vial 1     aspirin 325 MG tablet Take 650 mg by mouth daily. Takes about every 3 days       cholecalciferol, vitamin D3, (VITAMIN D3) 2,000 unit cap Take by mouth.       loratadine (CLARITIN) 10 mg tablet Take by mouth.       montelukast (SINGULAIR) 10 mg tablet TAKE ONE TABLET BY MOUTH ONE TIME DAILY 90 tablet 3     [] nitrofurantoin, macrocrystal-monohydrate, (MACROBID) 100 MG capsule Take 1 capsule (100 mg total) by mouth 2 (two) times a day for 7 days. 14 capsule 0     olopatadine (PATANOL) 0.1 % ophthalmic solution APPLY ONE DROP TO EYE TWICE DAILY 5 mL 1     No current facility-administered medications on file prior to visit.      Allergies   Allergen Reactions     Sulfa (Sulfonamide Antibiotics) Hives and Other (See Comments)     Turns red, hives, hot     Sulfamethoxazole-Trimethoprim Other (See Comments)     Patient was very swollen on day 9 on antibiotic     Social History     Socioeconomic History     Marital status:      Spouse name: Not on file     Number of children: Not on file     Years of education: Not on file     Highest education level: Not on file   Occupational History     Not on file   Social Needs     Financial resource strain: Not on file     Food insecurity     Worry: Not on file     Inability: Not on file     Transportation needs     Medical: Not on file     Non-medical: Not on file   Tobacco Use     Smoking status: Never Smoker     Smokeless tobacco: Never Used   Substance and Sexual Activity      Alcohol use: Yes     Alcohol/week: 4.0 standard drinks     Types: 4 Glasses of wine per week     Comment: 4-5 times week     Drug use: No     Sexual activity: Yes     Partners: Male     Birth control/protection: Surgical   Lifestyle     Physical activity     Days per week: Not on file     Minutes per session: Not on file     Stress: Not on file   Relationships     Social connections     Talks on phone: Not on file     Gets together: Not on file     Attends Faith service: Not on file     Active member of club or organization: Not on file     Attends meetings of clubs or organizations: Not on file     Relationship status: Not on file     Intimate partner violence     Fear of current or ex partner: Not on file     Emotionally abused: Not on file     Physically abused: Not on file     Forced sexual activity: Not on file   Other Topics Concern     Not on file   Social History Narrative     Not on file     Family History   Problem Relation Age of Onset     Hyperlipidemia Mother      Thyroid disease Mother         Graves     Hypertension Mother      Diabetes Father      Alzheimer's disease Father      Alcohol abuse Father      Dementia Father      Vision loss Father      Thyroid disease Sister         hypothyroidism     No Medical Problems Brother      Allergies Daughter      Thyroid disease Maternal Grandmother         Graves     Vision loss Maternal Grandmother      Hyperlipidemia Maternal Grandmother      Pancreatic cancer Paternal Grandmother      Cancer Paternal Grandmother         pancreas     Stroke Paternal Grandfather      No Medical Problems Daughter      Heart disease Paternal Uncle      Arthritis Paternal Aunt      Colon cancer Paternal Aunt      Heart disease Paternal Uncle      Heart disease Paternal Uncle      Heart disease Paternal Uncle      Review of systems is as stated in HPI, and the remainder of system review is otherwise negative.

## 2021-06-12 NOTE — TELEPHONE ENCOUNTER
Reason contacted:  question  Information relayed:  Patient will wait for COVID-19 results to return and will then complete flu test if negative. She states they didn't come out with the flu test and she didn't push for it.  Additional questions:  No  Further follow-up needed:  No  Okay to leave a detailed message:  No

## 2021-06-12 NOTE — PROGRESS NOTES
Assessment/Plan:     1. Atypical chest pain  Electrocardiogram Perform and Read    Lipid Cascade RANDOM   2. Fatigue  Thyroid Stimulating Hormone (TSH)    Vitamin D, Total (25-Hydroxy)    HM2(CBC w/o Differential)    Vitamin B12    Comprehensive Metabolic Panel   3. Visit for screening mammogram  Mammo Screening Bilateral   4. Esophageal reflux     5. Asthma         Diagnoses and all orders for this visit:    Atypical chest pain  -     Electrocardiogram Perform and Read  -     Lipid Cascade RANDOM  -She has not had recurrence of this discomfort.  She does have some tenderness in the midepigastric region on exam and discomfort occurred while eating.  She has history of heartburn.  Recommend omeprazole 20 mg daily for 4-6 weeks.  Prescription sent to pharmacy.  Counseled on use of medication and common side effects.  Also discussed dietary modifications including avoidance of acidic and spicy foods, avoidance of caffeine and alcohol as well as chocolate and peppermint.  Discussed that I do not feel this is related to underlying heart disease.  However if she continues to experience episodes of this chest pain, she should notify us and then would consider further cardiac evaluation.  We will check a lipid cascade today.    Fatigue  -     Thyroid Stimulating Hormone (TSH)  -     Vitamin D, Total (25-Hydroxy)  -     HM2(CBC w/o Differential)  -     Vitamin B12  -     Comprehensive Metabolic Panel    Visit for screening mammogram  -     Mammo Screening Bilateral; Future; Expected date: 8/9/17    Esophageal reflux  -Start omeprazole 20 mg daily for 4-6 weeks. Counseled on use of medication and side effects    Asthma  -Asthma action plan completed.  Continue current medications.    Other orders  -     EPINEPHrine (EPIPEN) 0.3 mg/0.3 mL atIn; Inject 0.3 mL (0.3 mg total) into the shoulder, thigh, or buttocks once for 1 dose.  Dispense: 1 Pre-filled Pen Syringe; Refill: 3  -     omeprazole (PRILOSEC) 20 MG capsule; Take 1  capsule (20 mg total) by mouth daily before breakfast.  Dispense: 30 capsule; Refill: 1             The following high BMI interventions were performed this visit: encouragement to exercise    Subjective:      Jocelyn Quiroga is a 51 y.o. female who comes in today with concern about an episode of chest pain.  This occurred last week.  She had been eating some chicken breast and brown rice.  Felt that she suddenly had some difficulty swallowing and then developed the pain in the upper abdomen going up through the chest and over to the left side of her chest and left shoulder area.  This was associated with discomfort when taking deep breaths.  She ends up going home.  Discomfort lasted about 2 hours and then resolved.  She felt a little bit sore afterwards.  Has not had any recurrent episodes.  Pain was described as achy in the shoulder and left chest otherwise it felt like somebody was kicking her.  It was not burning in nature.  She does report a history of some reflux symptoms, particularly when she eats Dein food or spicy peppers.  She will occasionally take Tums for this on an as-needed basis.  During this episode she did not have any nausea but did feel a little bit sweaty.  She has been having hot flashes due to perimenopause.  She reports that she has been feeling tired for about 2 weeks.  Similar to what she felt like in the past when she had vitamin D deficiency.  She does take a vitamin D supplement.  She has history of asthma and allergies but these have been under control with her current medication regimen.  She does take aspirin a couple of times a week.  There is family history of heart disease and some aunts and uncles as well as her grandfather.  We reviewed her health history as it has been a couple of years since she has been seen in our clinic.  She reports no other significant changes.  Review of systems is otherwise negative.  No other concerns or questions today.    Current Outpatient  Prescriptions   Medication Sig Dispense Refill     aspirin 325 MG tablet Take 650 mg by mouth daily.       cholecalciferol, vitamin D3, (VITAMIN D3) 2,000 unit cap Take by mouth.       EPINEPHrine (EPIPEN) 0.3 mg/0.3 mL atIn Inject 0.3 mL (0.3 mg total) into the shoulder, thigh, or buttocks once for 1 dose. 1 Pre-filled Pen Syringe 3     loratadine (CLARITIN) 10 mg tablet Take by mouth.       montelukast (SINGULAIR) 10 mg tablet Take 1 tablet (10 mg total) by mouth bedtime. 30 tablet 1     olopatadine (PATANOL) 0.1 % ophthalmic solution APPLY ONE DROP TO EYE TWICE DAILY 5 mL 0     omeprazole (PRILOSEC) 20 MG capsule Take 1 capsule (20 mg total) by mouth daily before breakfast. 30 capsule 1     PROAIR HFA 90 mcg/actuation inhaler INHALE 2 PUFFS EVERY 4 HOURS AS NEEDED 8.5 Inhaler 1     No current facility-administered medications for this visit.        Past Medical History, Family History, and Social History reviewed.  No past medical history on file.  No past surgical history on file.  Sulfa (sulfonamide antibiotics)  Family History   Problem Relation Age of Onset     Hyperlipidemia Mother      Thyroid disease Mother      Graves     Hypertension Mother      Diabetes Father      Dementia Father      Alzheimer's     Thyroid disease Sister      hypothyroidism     No Medical Problems Brother      Allergies Daughter      Thyroid disease Maternal Grandmother      Graves     Vision loss Maternal Grandmother      Cancer Paternal Grandmother      pancreatic     Stroke Paternal Grandfather      No Medical Problems Daughter      Heart disease Paternal Uncle      Social History     Social History     Marital status:      Spouse name: N/A     Number of children: N/A     Years of education: N/A     Occupational History     Not on file.     Social History Main Topics     Smoking status: Never Smoker     Smokeless tobacco: Never Used     Alcohol use 1.8 - 2.4 oz/week     3 - 4 Glasses of wine per week      Comment: 4-5 times  week     Drug use: No     Sexual activity: Yes     Partners: Male     Birth control/ protection: Surgical     Other Topics Concern     Not on file     Social History Narrative         Review of systems is as stated in HPI, and the remainder of the 10 system review is otherwise negative.    Objective:     Vitals:    08/09/17 0905   BP: 120/80   Patient Site: Right Arm   Patient Position: Sitting   Cuff Size: Adult Large   Pulse: 87   Temp: 97.9  F (36.6  C)   TempSrc: Tympanic   SpO2: 99%   Weight: 197 lb 4 oz (89.5 kg)    Body mass index is 34.94 kg/(m^2).    General appearance: alert, appears stated age and cooperative  Neck: supple, symmetrical, trachea midline and thyroid not enlarged, symmetric, no tenderness/mass/nodules  Lungs: clear to auscultation bilaterally  Heart: regular rate and rhythm, S1, S2 normal, no murmur, click, rub or gallop  Abdomen: The bowel sounds all 4 quadrants, mild tenderness to palpation in the midepigastric region.  Remainder of abdomen is nontender to palpation.  Abdomen is soft.  No masses.  No rebound or guarding  Extremities: extremities normal, atraumatic, no cyanosis or edema  Neurologic: Grossly normal    Results: Asthma control test score is 24    EKG was performed in clinic and personally reviewed.  This showed normal sinus rhythm.  Heart rate 76.  Normal EKG    This note has been dictated using voice recognition software. Any grammatical or context distortions are unintentional and inherent to the the software.

## 2021-06-13 NOTE — TELEPHONE ENCOUNTER
RN cannot approve Refill Request    RN can NOT refill this medication med is not covered by policy/route to provider. Last office visit: 2/13/2019 Sarah Santiago MD Last Physical: 7/19/2018 Last MTM visit: Visit date not found Last visit same specialty: 2/13/2019 Sarah Santiago MD.  Next visit within 3 mo: Visit date not found  Next physical within 3 mo: Visit date not found      Breanna Barrera, [facility] Triage/Med Refill 12/17/2020    Requested Prescriptions   Pending Prescriptions Disp Refills     montelukast (SINGULAIR) 10 mg tablet [Pharmacy Med Name: MONTELUKAST SOD 10 MG TABLET] 90 tablet 3     Sig: TAKE ONE TABLET BY MOUTH ONE TIME DAILY       There is no refill protocol information for this order

## 2021-06-14 NOTE — PROGRESS NOTES
Chief complaint: Follow-up allergy shots    History of present illness: This is a pleasant 52-year-old woman who is here today for follow-up of allergy shots.  She has been on allergy shots for almost 4 years.  No systemic reactions.  She continues on montelukast and Claritin.  She reports that she stopped montelukast, she does have hives.  She reports she does have some nasal congestion and drainage but symptoms seem to be much better controlled than where she was 3 years ago.  She has no cough, wheeze or shortness of breath.  She does have a history of intermittent asthma.  Asthma was active only last year when she had the flu.    Past medical history, social history, family medical history, meds and allergies reviewed and updated accordingly.    Review of Systems performed as above and the remainder is negative.      Current Outpatient Prescriptions:      aspirin 325 MG tablet, Take 650 mg by mouth daily. Takes about every 3 days, Disp: , Rfl:      cholecalciferol, vitamin D3, (VITAMIN D3) 2,000 unit cap, Take by mouth., Disp: , Rfl:      loratadine (CLARITIN) 10 mg tablet, Take by mouth., Disp: , Rfl:      montelukast (SINGULAIR) 10 mg tablet, Take 1 tablet (10 mg total) by mouth bedtime., Disp: 30 tablet, Rfl: 1     montelukast (SINGULAIR) 10 mg tablet, TAKE ONE TABLET BY MOUTH ONE TIME DAILY, Disp: 90 tablet, Rfl: 3     omeprazole (PRILOSEC) 20 MG capsule, Take 1 capsule (20 mg total) by mouth daily before breakfast., Disp: 30 capsule, Rfl: 1     olopatadine (PATANOL) 0.1 % ophthalmic solution, APPLY ONE DROP TO EYE TWICE DAILY, Disp: 5 mL, Rfl: 0     PROAIR HFA 90 mcg/actuation inhaler, INHALE 2 PUFFS EVERY 4 HOURS AS NEEDED, Disp: 8.5 Inhaler, Rfl: 1    Allergies   Allergen Reactions     Sulfa (Sulfonamide Antibiotics) Hives and Other (See Comments)     Turns red, hives, hot       /72  Pulse 76  Wt 198 lb (89.8 kg)  LMP  (LMP Unknown)  BMI 36.21 kg/m2  Gen: Pleasant female not in acute  distress  HEENT: Eyes no erythema of the bulbar or palpebral conjunctiva, no edema.Mouth: Throat clear, no lip or tongue edema.       Skin: No rashes or lesions  Psych: Alert and oriented times 3    Impression report and plan:  1.  Allergic rhinitis  2.  Intermittent asthma  3.  Hives    She could double up on her Claritin when she has a hive outbreak.  I do not think this is related to her allergies.  We talked about chronic urticaria.  Okay to continue Claritin montelukast for her hives.  I went over specific triggers for chronic urticaria.  I think she can discontinue allergy shots after she is done with this file.  Follow as needed.    Time spent with the patient, 15 minutes, greater than half spent counseling and coordination of care regarding allergies.

## 2021-06-15 NOTE — PROGRESS NOTES
Assessment/Plan:     Patient presents with signs and symptoms consistent with a resolving upper respiratory tract infection with cough and a history of asthma in mild exacerbation secondary to viral illness.  Refilled patient's pro-air inhaler.  Recommended supportive care with Afrin, especially as patient is about to fly, Robitussin, Mucinex, and lozenges.  Patient's physical exam was very benign, although she did use her inhaler less than an hour before our appointment.  I would have a low threshold to give her a steroid burst if patient experienced increased shortness of breath.    Problem List Items Addressed This Visit     None      Visit Diagnoses     Viral URI with cough    -  Primary    Relevant Medications    albuterol (PROAIR HFA) 90 mcg/actuation inhaler    Asthma        Relevant Medications    albuterol (PROAIR HFA) 90 mcg/actuation inhaler          Return to clinic PRN.    Total time spent with patient was 15 minutes with greater than 50% spent in face-to-face counseling regarding the above plan.    Subjective:      Jocelyn Quiroga is a 52 y.o. female who presents to clinic for asthma exacerbation and suspicion of a virus.      Patient has been experiencing symptoms for 8 days. It is worsening for the last 4.  Patient endorses: rhinorrhea, mild congestion, post-nasal drip, mild sore throat, sinus pressure located maxillary, fatigue, and cough that is occasionally productive, headache, SOB, wheezing, true fever (noticed on 1/2, 99.9), ear pressure  Patient denies: chest pain, true fever, ear pain, ear discharge, rash, muscle aches  Treatment thus far has consisted of theraflu cold and cough as well as cough drops.  Sick contacts include  and both daughter.  Recent travel denied but upcoming.  She has had the influenza vaccine.    Past Medical History, Family History, and Social History reviewed.     Current Outpatient Prescriptions on File Prior to Visit   Medication Sig Dispense Refill      "aspirin 325 MG tablet Take 650 mg by mouth daily. Takes about every 3 days       cholecalciferol, vitamin D3, (VITAMIN D3) 2,000 unit cap Take by mouth.       loratadine (CLARITIN) 10 mg tablet Take by mouth.       montelukast (SINGULAIR) 10 mg tablet Take 1 tablet (10 mg total) by mouth bedtime. 30 tablet 1     montelukast (SINGULAIR) 10 mg tablet TAKE ONE TABLET BY MOUTH ONE TIME DAILY 90 tablet 3     olopatadine (PATANOL) 0.1 % ophthalmic solution APPLY ONE DROP TO EYE TWICE DAILY 5 mL 0     omeprazole (PRILOSEC) 20 MG capsule Take 1 capsule (20 mg total) by mouth daily before breakfast. 30 capsule 1     PROAIR HFA 90 mcg/actuation inhaler INHALE 2 PUFFS EVERY 4 HOURS AS NEEDED 8.5 Inhaler 1     [DISCONTINUED] albuterol (PROAIR HFA) 90 mcg/actuation inhaler INHALE 2 PUFFS EVERY 4 HOURS AS NEEDED 8.5 Inhaler 1     No current facility-administered medications on file prior to visit.        Review of systems is as stated in HPI.  Patient endorses: allergy sx, diarrhea, headaches  The remainder of the 10 system review is otherwise negative.    Objective:     /82  Pulse 82  Temp 98.3  F (36.8  C)  Ht 5' 2\" (1.575 m)  Wt 198 lb (89.8 kg)  LMP  (LMP Unknown)  SpO2 98%  BMI 36.21 kg/m2 Body mass index is 36.21 kg/(m^2).    Gen: Alert, NAD, appears stated age, normal hygiene   Eyes: conjunctivae without injection, sclera clear, EOMI  ENT/mouth: nares clear, septum midline, absent rhinorrhea, throat without injection, neck is supple, no thyroid enlargement, bilateral TMs unremarkable although the right TM does appear scarred  CV: RRR, no murmur appreciated, pedal edema absent bilaterally  Resp: CTAB, no wheezes, rales or ronchi  ABD: non-tender to palpation, nondistended  MSK: grossly full range of motion in all joints, no obvious deformity  Neuro: CN II-XII grossly intact, no deficits in coordination  Psych: no apparent hallucinations or delusions, no pressured speech; alert, oriented x3  SKIN: dry and " without lesions  Heme/lymph: no pallor, no active bleeding/bruising, no adenopathy appreciated    This note has been dictated using voice recognition software. Any grammatical or context distortions are unintentional and inherent to the the software.     Dara Pedersen MD  Family Medicine [facility]

## 2021-06-16 PROBLEM — K21.9 GERD (GASTROESOPHAGEAL REFLUX DISEASE): Status: ACTIVE | Noted: 2018-04-10

## 2021-06-16 NOTE — PROGRESS NOTES
OP MNT Nutrition Assessment & Diet Education    Patient seen for out patient MNT initial assessment.    Referring diagnosis:    E66.9 (ICD-10-CM) - Obesity (BMI 35.0-39.9 without comorbidity)    K21.9 (ICD-10-CM) - GERD (gastroesophageal reflux disease)     Height: 62 inches  Weight: 198 lbs  BMI: 36.2  BMI indication: 35-39.9 obesity (class 2)    Nutrition intervention that addressed medical nutrition therapy for above diagnosis: Weight management. Discussed  1300 calorie level to include 3 carb choices per meal and 1-2 as snacks.  Sample Meal plan provided. Patient c/o hiatal hernia. RD reviewed diet strategies to manage reflux.     RD reviewed eating out/fast foods, label reading and physical activity.    Lifestyle changes made prior to nutrition session: Patient's daughter is Vegan so Elis always cooks lots of vegetables & includes fruit and  salads at supper meal.     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     Assessment of physical activity: Patient wants to get back to routine at [facility] which included strength training and stationary bike 5x/week    Goals:    Control calories through portion control and by controlling carb choices    Continue Vit D supplementation    Pursue increased Activity as planned      Patient had no barriers to learning, verbalized understanding, and compliance is expected.    Phone number provided for questions. Recommended follow-up in  as needed.    Thank you for this consult. Call me at [phone] for questions.

## 2021-06-16 NOTE — PROGRESS NOTES
Assessment/Plan:     1. Asthma exacerbation     2. Cough  XR Chest 2 Views    albuterol nebulizer solution 3 mL (PROVENTIL)    Nebulizer   3. Hiatal hernia         Diagnoses and all orders for this visit:    Asthma exacerbation  Chest x-ray is clear.  Symptoms are consistent with asthma exacerbation.  Will treat with prednisone 40 mg daily for 1 week then 20 mg daily for 1 week.  Counseled on use of medication and side effects. Continue regular use of albuterol inhaler.  Notify us if symptoms worsening or not improving with these measures.    Cough  -     XR Chest 2 Views  -     albuterol nebulizer solution 3 mL (PROVENTIL); Take 3 mL by nebulization once.  -     Nebulizer    Hiatal hernia  Continue omeprazole.  She has appointment to see dietitian in March.    Other orders  -     predniSONE (DELTASONE) 20 MG tablet; Take 2 tablets by mouth daily for 1 week and then take 1 tablet by mouth daily for 1 week  Dispense: 21 tablet; Refill: 0             Subjective:      Jocelyn Quiroga is a 52 y.o. female who comes in today complaining of ongoing cough and asthma symptoms.  She has history of asthma and allergies.  She is followed by Dr. Mendez and receives allergy shots.  She was seen in this clinic in early January for cold symptoms.  Supportive cares were recommended including over-the-counter Afrin spray, Robitussin and Mucinex.  She reports that she traveled to [place] in January.  When she returned she developed influenza.  She was seen at another clinic and diagnosed with influenza and was prescribed Tamiflu.  She did have the typical body aches fatigue and fever.  Symptoms improved but then in early February, she developed recurrence of flulike symptoms again.  Had headache, body aches and fever.  Those symptoms are improved but she continues to have a persistent cough.  It is mostly dry.  Over the last couple of days it has been more productive.  She does not have chest pain.  She does have some chest  tightness as well as some shortness of breath.  She often will wake up at night from the cough.  She has been using her albuterol inhaler but it has not been providing much relief.  She has also been taking over-the-counter TheraFlu.  The nighttime TheraFlu generally has helped her sleep through the night and get rest.  She has had some sore throat but she thinks is from the coughing.  Ears are feeling okay.  She has a lot of postnasal drainage.  We reviewed medications and allergies.  Chart is updated.  No other concerns or questions.  Review of systems is assessed and is otherwise negative.    Current Outpatient Prescriptions   Medication Sig Dispense Refill     albuterol (PROAIR HFA) 90 mcg/actuation inhaler INHALE 2 PUFFS EVERY 4 HOURS AS NEEDED 1 Inhaler 6     aspirin 325 MG tablet Take 650 mg by mouth daily. Takes about every 3 days       cholecalciferol, vitamin D3, (VITAMIN D3) 2,000 unit cap Take by mouth.       loratadine (CLARITIN) 10 mg tablet Take by mouth.       montelukast (SINGULAIR) 10 mg tablet TAKE ONE TABLET BY MOUTH ONE TIME DAILY 90 tablet 3     olopatadine (PATANOL) 0.1 % ophthalmic solution APPLY ONE DROP TO EYE TWICE DAILY 5 mL 0     omeprazole (PRILOSEC) 20 MG capsule TAKE 1 CAPSULE(20 MG) BY MOUTH DAILY BEFORE BREAKFAST 90 capsule 1     predniSONE (DELTASONE) 20 MG tablet Take 2 tablets by mouth daily for 1 week and then take 1 tablet by mouth daily for 1 week 21 tablet 0     No current facility-administered medications for this visit.        Past Medical History, Family History, and Social History reviewed.  Past Medical History:   Diagnosis Date     Asthma     environmental and viral induced     GERD (gastroesophageal reflux disease)      No past surgical history on file.  Sulfa (sulfonamide antibiotics)  Family History   Problem Relation Age of Onset     Hyperlipidemia Mother      Thyroid disease Mother      Graves     Hypertension Mother      Diabetes Father      Alzheimer's disease  Father      Thyroid disease Sister      hypothyroidism     No Medical Problems Brother      Allergies Daughter      Thyroid disease Maternal Grandmother      Graves     Vision loss Maternal Grandmother      Pancreatic cancer Paternal Grandmother      Stroke Paternal Grandfather      No Medical Problems Daughter      Heart disease Paternal Uncle      Social History     Social History     Marital status:      Spouse name: N/A     Number of children: N/A     Years of education: N/A     Occupational History     Not on file.     Social History Main Topics     Smoking status: Never Smoker     Smokeless tobacco: Never Used     Alcohol use 2.4 oz/week     4 Glasses of wine per week      Comment: 4-5 times week     Drug use: No     Sexual activity: Yes     Partners: Male     Birth control/ protection: Surgical     Other Topics Concern     Not on file     Social History Narrative         Review of systems is as stated in HPI, and the remainder of the 10 system review is otherwise negative.    Objective:     Vitals:    02/23/18 0953   BP: 124/80   Patient Site: Left Arm   Patient Position: Sitting   Cuff Size: Adult Large   Pulse: 95   Temp: 97.7  F (36.5  C)   TempSrc: Tympanic   SpO2: 97%   Weight: 198 lb 7 oz (90 kg)    Body mass index is 36.29 kg/(m^2).    General appearance: alert, appears stated age and cooperative  Head: Normocephalic, without obvious abnormality, atraumatic  Eyes: negative  Ears: effusion present behind both TMs  Nose: Nares normal. Septum midline. Mucosa normal. No drainage or sinus tenderness.  Throat: lips, mucosa, and tongue normal; teeth and gums normal  Neck: no adenopathy  Lungs: decreased air movement bilaterally, scattered expiratory wheezing both lungs; following albuterol nebulizer treatment in clinic, there was improvement in air movement on repeat lung exam.  Expiratory wheezing noted in bilateral lung fields.  No crackles or rhonchi.  Heart: regular rate and rhythm, S1, S2 normal,  no murmur, click, rub or gallop      Results: Chest x-ray was performed in clinic.  This was personally reviewed.  No evidence of infiltrate.  Large hiatal hernia was noted.    This note has been dictated using voice recognition software. Any grammatical or context distortions are unintentional and inherent to the the software.

## 2021-06-18 NOTE — PATIENT INSTRUCTIONS - HE
Patient Instructions by Sarah Santiago MD at 6/7/2020  5:34 PM     Author: Sarah Santiago MD Service: -- Author Type: Physician    Filed: 6/7/2020  5:34 PM Encounter Date: 6/7/2020 Status: Signed    : Sarah Santiago MD (Physician)           Urinary Tract Infections in Women    Urinary tract infections (UTIs) are most often caused by bacteria. These bacteria enter the urinary tract. The bacteria may come from inside the body. Or they may travel from the skin outside the rectum or vagina into the urethra. Female anatomy makes it easy for bacteria from the bowel to enter a womans urinary tract, which is the most common source of UTI. This means women develop UTIs more often than men. Pain in or around the urinary tract is a common UTI symptom. But the only way to know for sure if you have a UTI for the healthcare provider to test your urine. The two tests that may be done are the urinalysis and urine culture.  Types of UTIs    Cystitis. A bladder infection (cystitis) is the most common UTI in women. You may have urgent or frequent need to pee. You may also have pain, burning when you pee, and bloody urine.    Urethritis. This is an inflamed urethra, which is the tube that carries urine from the bladder to outside the body. You may have lower stomach or back pain. You may also have urgent or frequent need to pee.    Pyelonephritis. This is a kidney infection. If not treated, it can be serious and damage your kidneys. In severe cases, you may need to stay in the hospital. You may have a fever and lower back pain.    Medicines to treat a UTI  Most UTIs are treated with antibiotics. These kill the bacteria. The length of time you need to take them depends on the type of infection. It may be as short as 3 days. If you have repeated UTIs, you may need a low-dose antibiotic for several months. Take antibiotics exactly as directed. Dont stop taking them until all of the medicine is gone. If you stop taking the antibiotic  too soon, the infection may not go away. You may also develop a resistance to the antibiotic. This can make it much harder to treat.  Lifestyle changes to treat and prevent UTIs  The lifestyle changes below will help get rid of your UTI. They may also help prevent future UTIs.    Drink plenty of fluids. This includes water, juice, or other caffeine-free drinks. Fluids help flush bacteria out of your body.    Empty your bladder. Always empty your bladder when you feel the urge to pee. And always pee before going to sleep. Urine that stays in your bladder can lead to infection. Try to pee before and after sex as well.    Practice good personal hygiene. Wipe yourself from front to back after using the toilet. This helps keep bacteria from getting into the urethra.    Use condoms during sex. These help prevent UTIs caused by sexually transmitted bacteria. Also don't use spermicides during sex. These can increase the risk for UTIs. Choose other forms of birth control instead. For women who tend to get UTIs after sex, a low-dose of a preventive antibiotic may be used. Be sure to discuss this option with your healthcare provider.    Follow up with your healthcare provider as directed. He or she may test to make sure the infection has cleared. If needed, more treatment may be started.  Date Last Reviewed: 7/1/2019 2000-2019 [employer]. [address] [place] [address], [place] [address]. All rights reserved. This information is not intended as a substitute for professional medical care. Always follow your healthcare professional's instructions.        Based on the information that you have provided, I have placed an order for you to start treatment.  View your full visit summary for details. Click on the link below to access your visit summary.    Your pharmacist will address any questions you may have about taking the medication.

## 2021-06-18 NOTE — PATIENT INSTRUCTIONS - HE
Patient Instructions by Sarah Santiago MD at 10/14/2020  8:47 AM     Author: Sarah Santiago MD Service: -- Author Type: Physician    Filed: 10/14/2020  8:47 AM Encounter Date: 10/14/2020 Status: Signed    : Sarah Santiago MD (Physician)           Urinary Tract Infections in Women    Urinary tract infections (UTIs) are most often caused by bacteria. These bacteria enter the urinary tract. The bacteria may come from inside the body. Or they may travel from the skin outside the rectum or vagina into the urethra. Female anatomy makes it easy for bacteria from the bowel to enter a womans urinary tract, which is the most common source of UTI. This means women develop UTIs more often than men. Pain in or around the urinary tract is a common UTI symptom. But the only way to know for sure if you have a UTI for the healthcare provider to test your urine. The two tests that may be done are the urinalysis and urine culture.  Types of UTIs    Cystitis. A bladder infection (cystitis) is the most common UTI in women. You may have urgent or frequent need to pee. You may also have pain, burning when you pee, and bloody urine.    Urethritis. This is an inflamed urethra, which is the tube that carries urine from the bladder to outside the body. You may have lower stomach or back pain. You may also have urgent or frequent need to pee.    Pyelonephritis. This is a kidney infection. If not treated, it can be serious and damage your kidneys. In severe cases, you may need to stay in the hospital. You may have a fever and lower back pain.    Medicines to treat a UTI  Most UTIs are treated with antibiotics. These kill the bacteria. The length of time you need to take them depends on the type of infection. It may be as short as 3 days. If you have repeated UTIs, you may need a low-dose antibiotic for several months. Take antibiotics exactly as directed. Dont stop taking them until all of the medicine is gone. If you stop taking the  antibiotic too soon, the infection may not go away. You may also develop a resistance to the antibiotic. This can make it much harder to treat.  Lifestyle changes to treat and prevent UTIs  The lifestyle changes below will help get rid of your UTI. They may also help prevent future UTIs.    Drink plenty of fluids. This includes water, juice, or other caffeine-free drinks. Fluids help flush bacteria out of your body.    Empty your bladder. Always empty your bladder when you feel the urge to pee. And always pee before going to sleep. Urine that stays in your bladder can lead to infection. Try to pee before and after sex as well.    Practice good personal hygiene. Wipe yourself from front to back after using the toilet. This helps keep bacteria from getting into the urethra.    Use condoms during sex. These help prevent UTIs caused by sexually transmitted bacteria. Also don't use spermicides during sex. These can increase the risk for UTIs. Choose other forms of birth control instead. For women who tend to get UTIs after sex, a low-dose of a preventive antibiotic may be used. Be sure to discuss this option with your healthcare provider.    Follow up with your healthcare provider as directed. He or she may test to make sure the infection has cleared. If needed, more treatment may be started.  Date Last Reviewed: 7/1/2019 2000-2019 [employer]. [address] [place] [address], [place] [address]. All rights reserved. This information is not intended as a substitute for professional medical care. Always follow your healthcare professional's instructions.

## 2021-06-19 NOTE — PROGRESS NOTES
Preoperative Exam    Scheduled Procedure: robotic repair of paraesophageal hernia and laparoscopic implantation of magnetic esophageal sphincter  Surgery Date:  7/25/2018  Surgery Location: [facility]  Fax [phone]:  DR Dominique Souza    Assessment/Plan:     Jocelyn was seen today for pre-op exam.    Preoperative clearance  -     Electrocardiogram Perform and Read  -     HM2(CBC w/o Differential)  -     Basic Metabolic Panel  -Risk factors for planned procedure include underlying history of asthma.  Currently without symptoms of exacerbation.  She has experienced nausea with anesthesia in the past.  We will check hemogram and basic metabolic panel today.    Mild intermittent asthma without complication  Stable.  No symptoms of exacerbation at this time.  Continue albuterol inhaler as needed.  Continue montelukast.    GERD (gastroesophageal reflux disease)  Continue omeprazole.    Allergic rhinitis  Continue loratadine and montelukast.    Other chronic allergic conjunctivitis  Continue olopatadine eyedrops.    Paraesophageal hernia  Okay to proceed with planned procedure.      Surgical Procedure Risk: Intermediate (reported cardiac risk generally 1-5%)  Have you had prior anesthesia?: Yes  Have you or any family members had a previous anesthesia reaction:  Yes: nausea and wakes up slow  Do you or any family members have a history of a clotting or bleeding disorder?: No  Cardiac Risk Assessment: no increased risk for major cardiac complications    Patient approved for surgery with general or local anesthesia.        Functional Status: Independent  Patient plans to recover at home with family.     Subjective:      Jocelyn Quiroga is a 52 y.o. female who presents for a preoperative consultation.  She has a known medium sized paraesophageal hiatal hernia.  She is experiencing episodes of pain in the chest radiating to her back and left arm associated with shortness of breath and nausea.   Symptoms began about 1 year ago.  She did undergo cardiac workup and had negative stress test in August 2017.  Subsequently referred for upper endoscopy which showed presence of hiatal hernia.  She is taking omeprazole 20 mg daily which is somewhat helpful.  She is planning to have robotic repair of this paraesophageal hernia for more definitive relief of symptoms.  We reviewed her health history.  She has history of chronic rhinitis and allergic conjunctivitis as well as mild intermittent asthma.  She is followed by an allergy specialist.  She takes omeprazole, Claritin and montelukast.  She also uses albuterol inhaler as needed.  Currently denies symptoms of increased dyspnea or exacerbation of asthma.  No current symptoms of respiratory illness.  Denies changes in bowel movements.  No urinary symptoms.  No lower extremity edema.  Review of systems is assessed and is otherwise negative.  She has no other concerns or questions today.  She is holding aspirin prior to her procedure.      All other systems reviewed and are negative, other than those listed in the HPI.    Pertinent History  Do you have difficulty breathing or chest pain after walking up a flight of stairs: No  History of obstructive sleep apnea: No  Steroid use in the last 6 months: Yes: prednisone  Frequent Aspirin/NSAID use: Yes: 325 mg twice weekly  Prior Blood Transfusion: No  Prior Blood Transfusion Reaction: No  If for some reason prior to, during or after the procedure, if it is medically indicated, would you be willing to have a blood transfusion?:  There is no transfusion refusal.    Current Outpatient Prescriptions   Medication Sig Dispense Refill     albuterol (PROAIR HFA) 90 mcg/actuation inhaler INHALE 2 PUFFS EVERY 4 HOURS AS NEEDED 1 Inhaler 6     albuterol (PROVENTIL) 2.5 mg /3 mL (0.083 %) nebulizer solution Take 3 mL (2.5 mg total) by nebulization every 6 (six) hours as needed for wheezing. 60 vial 1     cholecalciferol, vitamin D3,  (VITAMIN D3) 2,000 unit cap Take by mouth.       loratadine (CLARITIN) 10 mg tablet Take by mouth.       montelukast (SINGULAIR) 10 mg tablet TAKE ONE TABLET BY MOUTH ONE TIME DAILY 90 tablet 3     olopatadine (PATANOL) 0.1 % ophthalmic solution APPLY ONE DROP TO EYE TWICE DAILY 5 mL 0     omeprazole (PRILOSEC) 20 MG capsule TAKE 1 CAPSULE(20 MG) BY MOUTH DAILY BEFORE BREAKFAST 90 capsule 1     aspirin 325 MG tablet Take 650 mg by mouth daily. Takes about every 3 days       No current facility-administered medications for this visit.         Allergies   Allergen Reactions     Sulfa (Sulfonamide Antibiotics) Hives and Other (See Comments)     Turns red, hives, hot       Patient Active Problem List   Diagnosis     Vitamin D Deficiency     Fatigue     Allergic Rhinitis     Urticaria     Chronic Allergic Conjunctivitis     Mild intermittent asthma without complication     Allergic Rhinitis Due To Pollen     Allergic Rhinitis Due To Animals     Joint Pain, Localized In The Left Shoulder     Joint Pain, Localized In The Knee     GERD (gastroesophageal reflux disease)       Past Medical History:   Diagnosis Date     Arthritis Jan 1995    not Rheumatoid- occurred during first pregnancy     Asthma     environmental and viral induced     GERD (gastroesophageal reflux disease)      Heart murmur birth    been assessed and deemed not an issue       Past Surgical History:   Procedure Laterality Date     CYST REMOVAL      on finger     WISDOM TOOTH EXTRACTION         Social History     Social History     Marital status:      Spouse name: N/A     Number of children: N/A     Years of education: N/A     Occupational History     Not on file.     Social History Main Topics     Smoking status: Never Smoker     Smokeless tobacco: Never Used     Alcohol use 2.4 oz/week     4 Glasses of wine per week      Comment: 4-5 times week     Drug use: No     Sexual activity: Yes     Partners: Male     Birth control/ protection: Surgical  "    Other Topics Concern     Not on file     Social History Narrative       Patient Care Team:  Sarah Santiago MD as PCP - General          Objective:     Vitals:    07/19/18 0757   BP: 117/68   Pulse: 79   Temp: 99.3  F (37.4  C)   TempSrc: Tympanic   SpO2: 95%   Weight: 199 lb 5 oz (90.4 kg)   Height: 5' 2\" (1.575 m)         Physical Exam:  Physical Examination: General appearance - alert, well appearing, and in no distress  Mental status - alert, oriented to person, place, and time  Eyes - pupils equal and reactive, extraocular eye movements intact, wears glasses  Ears - bilateral TM's and external ear canals normal  Nose - normal and patent, no erythema, discharge or polyps  Mouth - mucous membranes moist, pharynx normal without lesions  Neck - supple, no significant adenopathy  Lymphatics - no palpable lymphadenopathy, no hepatosplenomegaly  Chest - clear to auscultation, no wheezes, rales or rhonchi, symmetric air entry  Heart - normal rate, regular rhythm, normal S1, S2, no murmurs, rubs, clicks or gallops  Abdomen - tenderness noted RLQ, no rebound/guarding, no masses, normal BS  Neurological - alert, oriented, normal speech, no focal findings or movement disorder noted  Extremities - peripheral pulses normal, no pedal edema, no clubbing or cyanosis  Skin - normal coloration and turgor, no rashes, no suspicious skin lesions noted      There are no Patient Instructions on file for this visit.    EKG: EKG was performed in clinic.  This was personally reviewed.  It showed normal sinus rhythm.  Rate of 72.  Normal EKG.    Labs:  Labs pending at this time.  Results will be reviewed when available.    Immunization History   Administered Date(s) Administered     DT (pediatric) 01/01/2001     Hep A, historic 12/03/2009, 02/25/2011     Influenza [id number], 12/03/2009     Influenza, inj, historic,unspecified 10/20/2017     Td,adult,historic,unspecified 09/02/2008     Tdap 09/02/2008           Electronically signed by " Sarah Santiago MD 07/19/18 7:14 AM

## 2021-06-24 NOTE — PROGRESS NOTES
Assessment/Plan:     1. Counseling about travel     2. Health care maintenance  Hepatitis B Surface Antibody (Anti-HBs) Vaccine Check    Rubeola Antibody, IgG    Mumps Antibody, IgG    Rubella Antibody, IgG   3. Mild intermittent asthma without complication         Diagnoses and all orders for this visit:    Counseling about travel  Reviewed [facility] website for current recommendations for travel to [facility].  Patient will not be going to an area where yellow fever vaccine and malaria prophylaxis is needed.  She has had hepatitis A vaccine series.  We will check for immunity to hepatitis B.  If she is not immune, she will return for hepatitis B vaccine series.  Discussed there is a measles outbreak in [place] so we will check titers for measles, mumps and rubella and provide booster if needed.  She is due for influenza vaccine and tetanus booster.  These are administered today.  Discussed that typhoid vaccine is also recommended.  Prescription for this was sent to pharmacy and she was counseled on use of the vaccine and side effects.  Provided prescription for ciprofloxacin to use as needed for traveler's diarrhea.  Counseled on use of medication and side effects.  Discussed general safety precautions while traveling.  Recommend use of sunscreen and insect repellent.  Discussed importance of bringing along albuterol inhaler.    Health care maintenance  -     Hepatitis B Surface Antibody (Anti-HBs) Vaccine Check  -     Rubeola Antibody, IgG  -     Mumps Antibody, IgG  -     Rubella Antibody, IgG  -She will schedule appointment for physical within the next few months    Mild intermittent asthma without complication  Well controlled.  ACT score 25.  Continue albuterol inhaler as needed.               Subjective:      Jocelyn Quiroga is a 53 y.o. female who comes in today for travel consultation.  She will be traveling to [facility] March 3 - March 8 for work.  She will be staying in [place] the entire time.   We reviewed her current medications, allergies and updated her health history.  She reports that she is doing extremely well after undergoing repair of a paraesophageal hernia last summer.  She is no longer on omeprazole.  Her asthma has been much better controlled.  Only needs to use her albuterol inhaler if she has an upper respiratory infection.  She reports that she has not been sick with any upper respiratory infections this year and attributes this to working at home more.  She did not get a flu vaccine this year and would like that today.  She believes that she has had hepatitis B immunization series at work but we do not have documentation of that.  She has no other concerns or questions today.  Review of systems assessed and otherwise negative.    Current Outpatient Medications   Medication Sig Dispense Refill     aspirin 325 MG tablet Take 650 mg by mouth daily. Takes about every 3 days       cholecalciferol, vitamin D3, (VITAMIN D3) 2,000 unit cap Take by mouth.       montelukast (SINGULAIR) 10 mg tablet TAKE ONE TABLET BY MOUTH ONE TIME DAILY 90 tablet 3     albuterol (PROAIR HFA) 90 mcg/actuation inhaler INHALE 2 PUFFS EVERY 4 HOURS AS NEEDED 1 Inhaler 6     albuterol (PROVENTIL) 2.5 mg /3 mL (0.083 %) nebulizer solution Take 3 mL (2.5 mg total) by nebulization every 6 (six) hours as needed for wheezing. 60 vial 1     ciprofloxacin HCl (CIPRO) 500 MG tablet Take 1 tablet (500 mg total) by mouth 2 (two) times a day for 3 days. Take as needed for traveler's diarrhea 6 tablet 0     loratadine (CLARITIN) 10 mg tablet Take by mouth.       olopatadine (PATANOL) 0.1 % ophthalmic solution APPLY ONE DROP TO EYE TWICE DAILY 5 mL 0     typhoid (VIVOTIF) SR capsule Take 1 capsule by mouth every other day for 7 days 4 capsule 0     No current facility-administered medications for this visit.        Past Medical History, Family History, and Social History reviewed.  Past Medical History:   Diagnosis Date      Arthritis Jan 1995    not Rheumatoid- occurred during first pregnancy     Asthma     environmental and viral induced     GERD (gastroesophageal reflux disease)      Heart murmur birth    been assessed and deemed not an issue     Past Surgical History:   Procedure Laterality Date     CYST REMOVAL      on finger     WISDOM TOOTH EXTRACTION       Sulfa (sulfonamide antibiotics)  Family History   Problem Relation Age of Onset     Hyperlipidemia Mother      Thyroid disease Mother         Graves     Hypertension Mother      Diabetes Father      Alzheimer's disease Father      Alcohol abuse Father      Dementia Father      Vision loss Father      Thyroid disease Sister         hypothyroidism     No Medical Problems Brother      Allergies Daughter      Thyroid disease Maternal Grandmother         Graves     Vision loss Maternal Grandmother      Hyperlipidemia Maternal Grandmother      Pancreatic cancer Paternal Grandmother      Cancer Paternal Grandmother         pancreas     Stroke Paternal Grandfather      No Medical Problems Daughter      Heart disease Paternal Uncle      Arthritis Paternal Aunt      Colon cancer Paternal Aunt      Heart disease Paternal Uncle      Heart disease Paternal Uncle      Heart disease Paternal Uncle      Social History     Socioeconomic History     Marital status:      Spouse name: Not on file     Number of children: Not on file     Years of education: Not on file     Highest education level: Not on file   Social Needs     Financial resource strain: Not on file     Food insecurity - worry: Not on file     Food insecurity - inability: Not on file     Transportation needs - medical: Not on file     Transportation needs - non-medical: Not on file   Occupational History     Not on file   Tobacco Use     Smoking status: Never Smoker     Smokeless tobacco: Never Used   Substance and Sexual Activity     Alcohol use: Yes     Alcohol/week: 2.4 oz     Types: 4 Glasses of wine per week      Comment: 4-5 times week     Drug use: No     Sexual activity: Yes     Partners: Male     Birth control/protection: Surgical   Other Topics Concern     Not on file   Social History Narrative     Not on file         Review of systems is as stated in HPI, and the remainder of the 10 system review is otherwise negative.    Objective:     Vitals:    02/13/19 0940   BP: 112/64   Patient Site: Left Arm   Patient Position: Sitting   Cuff Size: Adult Large   Pulse: 82   Temp: 97.9  F (36.6  C)   TempSrc: Oral   SpO2: 97%   Weight: 199 lb 4 oz (90.4 kg)    Body mass index is 36.44 kg/m .    General appearance: alert, appears stated age and cooperative  Head: Normocephalic, without obvious abnormality, atraumatic        This note has been dictated using voice recognition software. Any grammatical or context distortions are unintentional and inherent to the the software.

## 2021-06-24 NOTE — PATIENT INSTRUCTIONS - HE
Flu and tetanus shots today    Check titers for hep b, measles, mumps and rubella.  Will need boosters if not immune    Typhoid vaccine (Vivotif) sent to pharmacy    Cipro as needed for traveler's diarrhea

## 2021-06-25 NOTE — TELEPHONE ENCOUNTER
Refill Approved    Rx renewed per Medication Renewal Policy. Medication was last renewed on 3/30/20.    Tyree Bo, [facility] Triage/Med Refill 6/9/2021     Requested Prescriptions   Pending Prescriptions Disp Refills     albuterol (PROAIR HFA;PROVENTIL HFA;VENTOLIN HFA) 90 mcg/actuation inhaler [Pharmacy Med Name: ALBUTEROL HFA 90 MCG INHALER] 8.5 Inhaler 6     Sig: TAKE 2 PUFFS BY MOUTH EVERY 4 HOURS AS NEEDED       Albuterol/Levalbuterol Refill Protocol Passed - 6/8/2021  9:56 AM        Passed - PCP or prescribing provider visit in last year     Last office visit with prescriber/PCP: 2/13/2019 Sarah Santiago MD OR same dept: Visit date not found OR same specialty: 2/13/2019 Sarah Santiago MD Last physical: 7/19/2018       Next appt within 3 mo: Visit date not found  Next physical within 3 mo: Visit date not found  Prescriber OR PCP: Sarah Santiago MD  Last diagnosis associated with med order: 1. Asthma  - albuterol (PROAIR HFA;PROVENTIL HFA;VENTOLIN HFA) 90 mcg/actuation inhaler [Pharmacy Med Name: ALBUTEROL HFA 90 MCG INHALER]; TAKE 2 PUFFS BY MOUTH EVERY 4 HOURS AS NEEDED  Dispense: 8.5 Inhaler; Refill: 6    If protocol passes may refill for 6 months if within 3 months of last provider visit (or a total of 9 months). If patient requesting >1 inhaler per month refill x 6 months and have patient make appointment with provider.

## 2021-06-25 NOTE — PROGRESS NOTES
Progress Notes by Srinivasan De Luna MD at 3/17/2017 10:30 AM     Author: Srinivasan De Luna MD Service: -- Author Type: Physician    Filed: 3/17/2017  9:30 PM Encounter Date: 3/17/2017 Status: Signed    : Srinivasan De Luna MD (Physician)              [facility]    Comprehensive and complex medical care - Chronic disease management - Shared decision making - Care coordination - Compassionate care    Patient advocacy - Rational deprescribing - Minimally disruptive medicine - Ethical focus - Customized care    Date of Service: 3/17/2017  Primary Provider: Sarah Santiago MD    Patient Care Team:  Sarah Santiago MD as PCP - General     ______________________________________________________________________     Patient's Pharmacy:    [facility] [id number] - [address]  [place] [address]  Phone: [phone] Fax: [phone]    [facility] [address] [facility] [employer] - [facility] [address] [facility]  [address] [facility] [address]  Phone: [phone] Fax: [phone]     Patient's Insurance:    Payor: BLUE CROSS / Plan: BLUE CROSS INDIVIDUAL / Product Type: MNSure /     ______________________________________________________________________     Jocelyn Quiroga is 51 y.o. female who comes in today for:  Chief Complaint   Patient presents with   ? Fever     sicne je   ? Cough   ? Headache      ______________________________________________________________________     Patient Active Problem List   Diagnosis   ? Vitamin D Deficiency   ? Fatigue   ? Abdominal Pain Above The Pubic Area (Suprapubic)   ? Allergic Rhinitis   ? Urticaria   ? Chronic Allergic Conjunctivitis   ? Asthma   ? Allergic Rhinitis Due To Pollen   ? Allergic Rhinitis Due To Animals   ? Joint Pain, Localized In The Left Shoulder   ? Joint Pain, Localized In The Knee      Current Outpatient Prescriptions   Medication Sig    ? aspirin 325 MG tablet Take 650 mg by mouth daily.   ? cholecalciferol, vitamin D3, (VITAMIN D3) 2,000 unit cap Take by mouth.   ? codeine-guaiFENesin (GUAIFENESIN AC)  mg/5 mL liquid Take 5 mL by mouth 4 (four) times a day as needed for cough.   ? EPINEPHrine (EPIPEN) 0.3 mg/0.3 mL (1:1,000) atIn Inject 0.3 mg into the shoulder, thigh, or buttocks once.   ? loratadine (CLARITIN) 10 mg tablet Take by mouth.   ? montelukast (SINGULAIR) 10 mg tablet Take 1 tablet (10 mg total) by mouth bedtime.   ? montelukast (SINGULAIR) 10 mg tablet TAKE ONE TABLET BY MOUTH ONE TIME DAILY   ? olopatadine (PATANOL) 0.1 % ophthalmic solution APPLY ONE DROP TO EYE TWICE DAILY   ? PROAIR HFA 90 mcg/actuation inhaler INHALE 2 PUFFS EVERY 4 HOURS AS NEEDED   ? oseltamivir (TAMIFLU) 75 MG capsule Take 1 capsule (75 mg total) by mouth daily for 7 days.   ? predniSONE (DELTASONE) 20 MG tablet Take 2 tablets (40 mg total) by mouth daily for 5 days.      ______________________________________________________________________       History of present illness:    Patient comes in today for feeling very poorly since Sunday.  It came on rather suddenly for her.  She has been headachy with severe body aches on Sunday and Monday.  She's had a dry cough, chills, and fever.  She has started to have some cough with green-yellow sputum starting this morning.  She has a 16-year-old at home who has been sick and her  has been sick since Wednesday.  He went in and was tested positive for influenza B.  Her daughter also was tested and came back positive for influenza B.  Her daughter from the [facility] will be coming home this weekend and she is worried about giving it to her.  Nothing seems to help her symptoms.    She does have underlying asthma and her asthma has been worse with this.  She has been using her pro-air inhaler 2-3 times a day.  She hasn't been wheezing too much.  She has used prednisone one time in her life in  the past.  She does have history of allergies.    On review of systems, the patient denies any chest pain or shortness of breath.  ______________________________________________________________________     Exam:    Visit Vitals   ? /62   ? Pulse 94   ? Temp 97.7  F (36.5  C) (Oral)   ? Wt 199 lb (90.3 kg)   ? SpO2 95%   ? BMI 35.25 kg/m2     Patient is comfortable, no apparent distress.  Mood good.  Insight good.  Ears -   Clear.  Nose exam shows no, mild rhinitis.  Throat -  clear.  Neck is supple, there is no cervical adenopathy.  No thyromegaly.  Heart regular rate and rhythm.  Lungs are clear to auscultation bilaterally.   She might have a slight increase in her expiratory phase.  Respiratory effort is good.   No edema    ______________________________________________________________________    Recent Results (from the past 240 hour(s))   Influenza A/B Rapid Test   Result Value Ref Range    Influenza  A, Rapid Antigen No Influenza A antigen detected No Influenza A antigen detected    Influenza B, Rapid Antigen Influenza B antigen detected (!) No Influenza B antigen detected       ______________________________________________________________________   ______________________________________________________________________     Assessment:    1. Influenza B    2. Fever    3. Asthma       ______________________________________________________________________    PHQ-2 Total Score: 0 (6/20/2016 11:00 AM)  No Data Recorded      Plan:    1. Symptomatic treatment for influenza at this time.  2. Given a prescription for prednisone in case her breathing worsens.  3. Given a prescription for Tamiflu for her older daughter who will be visiting her this weekend.    Srinivasan De Luna MD  General Internal Medicine  [facility]    Return if symptoms worsen or fail to improve.

## 2021-06-29 NOTE — PROGRESS NOTES
Progress Notes by Sarah Santiago MD at 6/7/2020  5:34 PM     Author: Sarah Santiago MD Service: -- Author Type: Physician    Filed: 6/7/2020  5:34 PM Encounter Date: 6/7/2020 Status: Signed    : Sarah Santiago MD (Physician)         Assessment:   The encounter diagnosis was Acute UTI (urinary tract infection).     Plan:     Medications Ordered   Medications   ? nitrofurantoin, macrocrystal-monohydrate, (MACROBID) 100 MG capsule     Sig: Take 1 capsule (100 mg total) by mouth 2 (two) times a day for 7 days.     Dispense:  14 capsule     Refill:  0     Patient Instructions       Urinary Tract Infections in Women    Urinary tract infections (UTIs) are most often caused by bacteria. These bacteria enter the urinary tract. The bacteria may come from inside the body. Or they may travel from the skin outside the rectum or vagina into the urethra. Female anatomy makes it easy for bacteria from the bowel to enter a womans urinary tract, which is the most common source of UTI. This means women develop UTIs more often than men. Pain in or around the urinary tract is a common UTI symptom. But the only way to know for sure if you have a UTI for the healthcare provider to test your urine. The two tests that may be done are the urinalysis and urine culture.  Types of UTIs    Cystitis. A bladder infection (cystitis) is the most common UTI in women. You may have urgent or frequent need to pee. You may also have pain, burning when you pee, and bloody urine.    Urethritis. This is an inflamed urethra, which is the tube that carries urine from the bladder to outside the body. You may have lower stomach or back pain. You may also have urgent or frequent need to pee.    Pyelonephritis. This is a kidney infection. If not treated, it can be serious and damage your kidneys. In severe cases, you may need to stay in the hospital. You may have a fever and lower back pain.    Medicines to treat a UTI  Most UTIs are treated with  antibiotics. These kill the bacteria. The length of time you need to take them depends on the type of infection. It may be as short as 3 days. If you have repeated UTIs, you may need a low-dose antibiotic for several months. Take antibiotics exactly as directed. Dont stop taking them until all of the medicine is gone. If you stop taking the antibiotic too soon, the infection may not go away. You may also develop a resistance to the antibiotic. This can make it much harder to treat.  Lifestyle changes to treat and prevent UTIs  The lifestyle changes below will help get rid of your UTI. They may also help prevent future UTIs.    Drink plenty of fluids. This includes water, juice, or other caffeine-free drinks. Fluids help flush bacteria out of your body.    Empty your bladder. Always empty your bladder when you feel the urge to pee. And always pee before going to sleep. Urine that stays in your bladder can lead to infection. Try to pee before and after sex as well.    Practice good personal hygiene. Wipe yourself from front to back after using the toilet. This helps keep bacteria from getting into the urethra.    Use condoms during sex. These help prevent UTIs caused by sexually transmitted bacteria. Also don't use spermicides during sex. These can increase the risk for UTIs. Choose other forms of birth control instead. For women who tend to get UTIs after sex, a low-dose of a preventive antibiotic may be used. Be sure to discuss this option with your healthcare provider.    Follow up with your healthcare provider as directed. He or she may test to make sure the infection has cleared. If needed, more treatment may be started.  Date Last Reviewed: 7/1/2019 2000-2019 [employer]. [address] [place] [id number]. All rights reserved. This information is not intended as a substitute for professional medical care. Always follow your healthcare professional's instructions.        Based on the  information that you have provided, I have placed an order for you to start treatment.  View your full visit summary for details. Click on the link below to access your visit summary.    Your pharmacist will address any questions you may have about taking the medication.    Return for further follow up if needed. Call [phone] or schedule an appointment via [employer]    Subjective:   Jocelyn Quiroga is a 54 y.o. female who submitted an eVisit request for evaluation of her Dysuria.  See the questionnaire and message section of encounter report for information related to history of present illness and review of systems.    The following portions of the patient's history were reviewed and updated as appropriate:  She does not have any pertinent problems on file.  She is allergic to sulfa (sulfonamide antibiotics)..     Objective:   No exam performed today, patient submitted as eVisit.      Provider E-Visit time total (minutes): 5 minutes

## 2021-06-29 NOTE — PROGRESS NOTES
Progress Notes by Sarah Santiago MD at 10/14/2020  8:47 AM     Author: Sarah Santiago MD Service: -- Author Type: Physician    Filed: 10/14/2020  8:48 AM Encounter Date: 10/14/2020 Status: Signed    : Sarah Santiago MD (Physician)         Assessment:   The encounter diagnosis was Acute UTI (urinary tract infection).     Plan:     Medications Ordered   Medications   ? nitrofurantoin, macrocrystal-monohydrate, (MACROBID) 100 MG capsule     Sig: Take 1 capsule (100 mg total) by mouth 2 (two) times a day for 7 days.     Dispense:  14 capsule     Refill:  0     Patient Instructions       Urinary Tract Infections in Women    Urinary tract infections (UTIs) are most often caused by bacteria. These bacteria enter the urinary tract. The bacteria may come from inside the body. Or they may travel from the skin outside the rectum or vagina into the urethra. Female anatomy makes it easy for bacteria from the bowel to enter a womans urinary tract, which is the most common source of UTI. This means women develop UTIs more often than men. Pain in or around the urinary tract is a common UTI symptom. But the only way to know for sure if you have a UTI for the healthcare provider to test your urine. The two tests that may be done are the urinalysis and urine culture.  Types of UTIs    Cystitis. A bladder infection (cystitis) is the most common UTI in women. You may have urgent or frequent need to pee. You may also have pain, burning when you pee, and bloody urine.    Urethritis. This is an inflamed urethra, which is the tube that carries urine from the bladder to outside the body. You may have lower stomach or back pain. You may also have urgent or frequent need to pee.    Pyelonephritis. This is a kidney infection. If not treated, it can be serious and damage your kidneys. In severe cases, you may need to stay in the hospital. You may have a fever and lower back pain.    Medicines to treat a UTI  Most UTIs are treated with  antibiotics. These kill the bacteria. The length of time you need to take them depends on the type of infection. It may be as short as 3 days. If you have repeated UTIs, you may need a low-dose antibiotic for several months. Take antibiotics exactly as directed. Dont stop taking them until all of the medicine is gone. If you stop taking the antibiotic too soon, the infection may not go away. You may also develop a resistance to the antibiotic. This can make it much harder to treat.  Lifestyle changes to treat and prevent UTIs  The lifestyle changes below will help get rid of your UTI. They may also help prevent future UTIs.    Drink plenty of fluids. This includes water, juice, or other caffeine-free drinks. Fluids help flush bacteria out of your body.    Empty your bladder. Always empty your bladder when you feel the urge to pee. And always pee before going to sleep. Urine that stays in your bladder can lead to infection. Try to pee before and after sex as well.    Practice good personal hygiene. Wipe yourself from front to back after using the toilet. This helps keep bacteria from getting into the urethra.    Use condoms during sex. These help prevent UTIs caused by sexually transmitted bacteria. Also don't use spermicides during sex. These can increase the risk for UTIs. Choose other forms of birth control instead. For women who tend to get UTIs after sex, a low-dose of a preventive antibiotic may be used. Be sure to discuss this option with your healthcare provider.    Follow up with your healthcare provider as directed. He or she may test to make sure the infection has cleared. If needed, more treatment may be started.  Date Last Reviewed: 7/1/2019 2000-2019 [employer]. [address] [place] [address], [place] [address]. All rights reserved. This information is not intended as a substitute for professional medical care. Always follow your healthcare professional's instructions.          Return for  further follow up if needed. Call [phone] or schedule an appointment via [facility]    Subjective:   Jocelyn Quiroga is a 55 y.o. female who submitted an eVisit request for evaluation of her Dysuria.  See the questionnaire and message section of encounter report for information related to history of present illness and review of systems.    The following portions of the patient's history were reviewed and updated as appropriate:  She does not have any pertinent problems on file.  She is allergic to sulfa (sulfonamide antibiotics)..     Objective:   No exam performed today, patient submitted as eVisit.  Provider E-Visit time total (minutes): 5 minutes

## 2021-07-03 NOTE — ADDENDUM NOTE
Addendum Note by Kristina Hernández MD at 10/22/2020  9:00 AM     Author: Kristina Hernández MD Service: -- Author Type: Physician    Filed: 10/22/2020  9:51 AM Encounter Date: 10/22/2020 Status: Signed    : Kristina Hernández MD (Physician)    Addended by: KRISTINA HERNÁNDEZ on: 10/22/2020 09:51 AM        Modules accepted: Orders

## 2021-07-21 ENCOUNTER — RECORDS - HEALTHEAST (OUTPATIENT)
Dept: ADMINISTRATIVE | Facility: CLINIC | Age: 56
End: 2021-07-21

## 2021-08-15 ENCOUNTER — HEALTH MAINTENANCE LETTER (OUTPATIENT)
Age: 56
End: 2021-08-15

## 2021-10-11 ENCOUNTER — HEALTH MAINTENANCE LETTER (OUTPATIENT)
Age: 56
End: 2021-10-11

## 2021-12-05 DIAGNOSIS — J45.20 MILD INTERMITTENT ASTHMA: ICD-10-CM

## 2021-12-07 RX ORDER — MONTELUKAST SODIUM 10 MG/1
TABLET ORAL
Qty: 90 TABLET | Refills: 3 | Status: SHIPPED | OUTPATIENT
Start: 2021-12-07 | End: 2022-01-20

## 2021-12-07 NOTE — TELEPHONE ENCOUNTER
"Routing refill request to provider for review/approval because:  ACT score  Patient needs to be seen because it has been more than 1 year since last office visit.    Last Written Prescription Date:  12/18/20  Last Fill Quantity: 90,  # refills: 3   Last office visit provider:  10/22/20     Requested Prescriptions   Pending Prescriptions Disp Refills     montelukast (SINGULAIR) 10 MG tablet [Pharmacy Med Name: MONTELUKAST SOD 10 MG TABLET] 90 tablet 3     Sig: TAKE ONE TABLET BY MOUTH ONE TIME DAILY       Leukotriene Inhibitors Protocol Failed - 12/5/2021  9:35 AM        Failed - Asthma control assessment score within normal limits in last 6 months     Please review ACT score.           Passed - Patient is age 12 or older     If patient is under 16, ok to refill using age based dosing.           Passed - Medication is active on med list        Passed - Recent (6 mo) or future (30 days) visit within the authorizing provider's specialty     Patient had office visit in the last 6 months or has a visit in the next 30 days with authorizing provider or within the authorizing provider's specialty.  See \"Patient Info\" tab in inbasket, or \"Choose Columns\" in Meds & Orders section of the refill encounter.                 Tyree Bo RN 12/07/21 9:27 AM  "

## 2021-12-10 ENCOUNTER — OFFICE VISIT (OUTPATIENT)
Dept: FAMILY MEDICINE | Facility: CLINIC | Age: 56
End: 2021-12-10
Payer: COMMERCIAL

## 2021-12-10 VITALS
DIASTOLIC BLOOD PRESSURE: 88 MMHG | OXYGEN SATURATION: 98 % | WEIGHT: 209.7 LBS | SYSTOLIC BLOOD PRESSURE: 124 MMHG | HEART RATE: 74 BPM | BODY MASS INDEX: 38.35 KG/M2

## 2021-12-10 DIAGNOSIS — M25.512 ACUTE PAIN OF BOTH SHOULDERS: Primary | ICD-10-CM

## 2021-12-10 DIAGNOSIS — Z23 HIGH PRIORITY FOR 2019-NCOV VACCINE: ICD-10-CM

## 2021-12-10 DIAGNOSIS — M25.511 ACUTE PAIN OF BOTH SHOULDERS: Primary | ICD-10-CM

## 2021-12-10 DIAGNOSIS — L57.0 ACTINIC KERATOSIS: ICD-10-CM

## 2021-12-10 PROCEDURE — 99213 OFFICE O/P EST LOW 20 MIN: CPT | Mod: 25 | Performed by: FAMILY MEDICINE

## 2021-12-10 PROCEDURE — 0004A COVID-19,PF,PFIZER (12+ YRS): CPT | Performed by: FAMILY MEDICINE

## 2021-12-10 PROCEDURE — 17000 DESTRUCT PREMALG LESION: CPT | Performed by: FAMILY MEDICINE

## 2021-12-10 PROCEDURE — 91300 COVID-19,PF,PFIZER (12+ YRS): CPT | Performed by: FAMILY MEDICINE

## 2021-12-10 RX ORDER — NAPROXEN 500 MG/1
TABLET ORAL
Qty: 60 TABLET | Refills: 1 | Status: SHIPPED | OUTPATIENT
Start: 2021-12-10 | End: 2022-03-19

## 2021-12-10 NOTE — PROGRESS NOTES
Assessment & Plan     Acute pain of both shoulders  Suspect rotator cuff injury.  Discussed possible treatment options including referral to Ortho for possible cortisone injection versus referral to physical therapy and treatment with NSAIDs.  She would like to start with physical therapy and NSAIDs.  Prescription for naproxen sent to pharmacy.  Counseled on use of medication.  If symptoms not improving with this treatment, she will contact me and we will plan to refer her to orthopedic surgeon for further evaluation  - naproxen (NAPROSYN) 500 MG tablet  Dispense: 60 tablet; Refill: 1  - Physical Therapy Referral    High priority for 2019-nCoV vaccine    - COVID-19,PF,PFIZER (12+ Yrs PURPLE LABEL)    Actinic keratosis  Recommend treatment with liquid nitrogen.  Verbal consent obtained.  Lesion was treated with two 20 second freeze-thaw cycles of liquid nitrogen.  She tolerated procedure well.  Counseled on wound cares.  - AK 1 (qty 1 w/ code [[id number]]                   No follow-ups on file.    Sarah Santiago MD  [facility] KVNG Gillis is a 56 year old who presents for the following health issues     HPI   She is seen today for concern about bilateral shoulder pain.  This has been going on for about 2 months.  She had an injury the second week of October.  She was walking down a flight of stairs and slipped.  She grabbed onto the wall and the railing but continued to fall forward.  Did not notice any significant discomfort in her shoulders until about 2 weeks later.  Worse on the left side compared to the right.  Her left side is her dominant side.  It is hard for her to sleep at night because she is not able to lay on either side.  She heard some clicking and snapping sounds in her left shoulder with certain movements.  She has pain radiating down into her upper arms.  She feels like symptoms are getting worse.  She has no prior history of shoulder pain.  She does not notice any  weakness.  No numbness or tingling.  It is hard to reach behind her back and above her head.  She has taken ibuprofen that helps some.  She has not tried ice.  She did get a massage and that did help improve her discomfort a little bit.  She is hoping to get a referral for physical therapy.  She has also noticed a spot on her left upper arm that is dry and scaly.  She tends to pick at it.  It has been there a couple of months.  Is a little bit itchy.  She would like her Covid booster.  No other concerns or questions.        Review of Systems         Objective    /88 (BP Location: Left arm, Cuff Size: Adult Regular)   Pulse 74   Wt 95.1 kg (209 lb 11.2 oz)   SpO2 98%   BMI 38.35 kg/m    Body mass index is 38.35 kg/m .  Physical Exam   GENERAL: healthy, alert and no distress  Musculoskeletal: There is tenderness in the acromioclavicular joints bilaterally as well as in the region of the supraspinatus tendon bilaterally.  She has decreased internal range of motion due to discomfort.  Skin: There is a slightly raised scaly lesion on her left upper arm concerning for actinic keratosis

## 2021-12-10 NOTE — PATIENT INSTRUCTIONS
Patient Education     Understanding a Rotator Cuff Tendon Tear    The rotator cuff is a group of 4 muscles and their tendons in the shoulder. The muscles are located in the front, back, and top of the shoulder joint. They each have a strong band of tissue (tendon) that attaches to the top of the upper arm bone. This helps keep the arm bone firmly in place in the socket of the shoulder joint. The muscles and tendons of the rotator cuff also help the shoulder joint with certain movements. These include reaching the arm over the head and rotating the arm.   Any one of the rotator cuff tendons can fray or tear from causes such as injury and overuse. A tear may be partial or complete. With a partial tear, some of the tendon is still intact. With a complete tear, the tendon is fully torn. Both types can cause pain and weakness. Arm and should movement also may be limited. A rotator cuff tear often needs treatment to heal properly.   Causes of a rotator cuff tendon tear  Causes can include:    Wear and tear of the tendons from normal use over time or from aging    Overuse of the tendons from sports or work activities, especially those that involve repeated overhead movements    Injury to the tendons from a fall or other accident  Symptoms of a rotator cuff tendon tear  Some people with a rotator cuff tendon tear have few or no symptoms. Others may have symptoms that range from mild to severe. Possible symptoms include:     Pain in your shoulder, which may be worse with overhead movements or at night from lying on the affected side    Weakness in your arm and shoulder    Trouble lifting up your arm or rotating it    Clicking or crackling sounds when moving or using your arm and shoulder  Treating a rotator cuff tendon tear  Treatment for a rotator cuff tendon tear depends on several factors. These include how severe the tear is and your symptoms. Treatment may include:     Resting your arm and shoulder. This involves  limiting certain movements, such as reaching above your head or lifting up your arm. These can slow healing and worsen symptoms. You may also need to avoid certain sports and types of work for a time.    Cold packs or heat packs. These help reduce pain and swelling.    Prescription or over-the-counter medicines  These help reduce pain and swelling. NSAIDs (nonsteroidal anti-inflammatory drugs) are the most common medicines used. They may be taken as pills. Or they may be put on the skin as a gel, cream, or patch.    Injections of medicine into your shoulder. These help relieve pain and swelling for a time. The medicine is usually a corticosteroid. This is a strong medicine that helps ease inflammation .    Physical therapy and exercises.  These help improve strength, flexibility, and range of motion in your arm and shoulder.     Surgery. Surgery may be needed if your tendon is completely torn or if other treatments don t relieve your symptoms. Different options are available. In many cases, the damaged tendon is repairedand is reattached to your arm bone.  Possible complications    If a partial tear isn t given time to heal, it may get larger or tear completely. You may then need more treatment.    Even with treatment, a partial or complete tear may sometimes have trouble healing. The problem may become long-term (chronic). This can cause ongoing pain, weakness, and limited movement of your arm and shoulder.    When to call your healthcare provider  Call your healthcare provider right away if you have any of these:    Fever of 100.4 F (38 C) or higher, or as advised by your healthcare provider    Chills    Symptoms that don t get better with treatment, or get worse    Redness, warmth, swelling, bleeding or drainage at the incision site after surgery    New symptoms  Juan last reviewed this educational content on 6/1/2019 2000-2021 [employer]. All rights reserved. This information is not  intended as a substitute for professional medical care. Always follow your healthcare professional's instructions.

## 2022-01-12 ENCOUNTER — HOSPITAL ENCOUNTER (OUTPATIENT)
Dept: PHYSICAL THERAPY | Facility: REHABILITATION | Age: 57
End: 2022-01-12
Payer: COMMERCIAL

## 2022-01-12 DIAGNOSIS — M25.511 ACUTE PAIN OF BOTH SHOULDERS: ICD-10-CM

## 2022-01-12 DIAGNOSIS — M62.81 MUSCLE WEAKNESS (GENERALIZED): Primary | ICD-10-CM

## 2022-01-12 DIAGNOSIS — M25.512 ACUTE PAIN OF BOTH SHOULDERS: ICD-10-CM

## 2022-01-12 PROCEDURE — 97140 MANUAL THERAPY 1/> REGIONS: CPT | Mod: GP | Performed by: PHYSICAL THERAPIST

## 2022-01-12 PROCEDURE — 97110 THERAPEUTIC EXERCISES: CPT | Mod: GP | Performed by: PHYSICAL THERAPIST

## 2022-01-12 PROCEDURE — 97161 PT EVAL LOW COMPLEX 20 MIN: CPT | Mod: GP | Performed by: PHYSICAL THERAPIST

## 2022-01-12 NOTE — PROGRESS NOTES
01/12/22 0700   General Information   Type of Visit Initial OP Ortho PT Evaluation   Start of Care Date 01/12/22   Referring Physician Dr Sarah Santiago   Orders Evaluate and Treat   Date of Order 12/10/21   Certification Required? No   Medical Diagnosis Acute Bilateral Shoulder pain    Surgical/Medical history reviewed Yes   Precautions/Limitations no known precautions/limitations   Weight-Bearing Status - LUE full weight-bearing   Weight-Bearing Status - RUE full weight-bearing   Weight-Bearing Status - LLE full weight-bearing   Weight-Bearing Status - RLE full weight-bearing       Present No   Body Part(s)   Body Part(s) Shoulder   Presentation and Etiology   Pertinent history of current problem (include personal factors and/or comorbidities that impact the POC) Pateint reports she had a fall on steps in the early part of October in her home when coming down the steps to the second level and she slide on the step and pushed her arm against the wall and grabbed on the handrail with the other arm. there was initiallty some soreness and then about 2 weeks later the pain really increased when she went to [place] to see her daughter, driving, sleeping and when at the [employer] and carrying all of the bags on her shoulders that really increased the pain. Since this time the snapping and pain seemed to be increased  and went to see the Dr in Dec 2021 - she was prescribed Alieve but has been taking ibuprofen instead 2 times per day. this has helped the pain. pain is on the lateral arm; snapping is better. Pain with certain movements and sleeping on the arms is hard, reaching behind is the most painful, and reaching out in front of her.    Impairments A. Pain;D. Decreased ROM;E. Decreased flexibility;F. Decreased strength and endurance   Functional Limitations perform activities of daily living;perform required work activities;perform desired leisure / sports activities   Symptom Location  lateral bilateral shoulders    How/Where did it occur With a fall   Onset date of current episode/exacerbation 12/10/22   Chronicity New   Pain rating (0-10 point scale) Best (/10);Worst (/10)   Best (/10) 0   Worst (/10) 5-6   Pain quality A. Sharp;C. Aching   Frequency of pain/symptoms B. Intermittent   Pain/symptoms are: The same all the time   Pain/symptoms exacerbated by C. Lifting;D. Carrying;G. Certain positions;H. Overhead reach;L. Work tasks;K. Home tasks;J. ADL   Pain/symptoms eased by C. Rest;E. Changing positions;F. Certain positions;I. OTC medication(s);G. Heat;H. Cold   Progression of symptoms since onset: Improved   Current Level of Function   Current Community Support Family/friend caregiver   Patient role/employment history A. Employed   [facility]   Home/community accessibility split level home    Current equipment-Gait/Locomotion None   Current equipment-ADL None   Fall Risk Screen   Fall screen completed by PT   Have you fallen 2 or more times in the past year? Yes   Have you fallen and had an injury in the past year? Yes   Is patient a fall risk? No   Fall screen comments slipped on steps   Abuse Screen (yes response referral indicated)   Feels Unsafe at Home or Work/School no   Feels Threatened by Someone no   Does Anyone Try to Keep You From Having Contact with Others or Doing Things Outside Your Home? no   Physical Signs of Abuse Present no   System Outcome Measures   Outcome Measures   (SPADI 46.9%)   Shoulder Objective Findings   Side (if bilateral, select both right and left) Right;Left   Cervical Screen (ROM, quadrant) WNL ROM of the cervical spine, mild pain with right rotation to right lateral shoulder    Thoracic Outlet Syndrom (Alaina, Adson, Lalita, Coy) negative Alaina    Neer's Test positive    Cotton-Lenny Test mild tenderness   Lovilia's Test neg   Sulcus Test neg   Palpation tender to lateral arm at insertion of rotator cuff bilaterally, mild tenderness to  the right bicep tender and snterior shoulder as well, tight and tender to pec muscles   Accessory Motion/Joint Mobility hypommobile to posterior GH mob on L, mild inferior on L and right    Right Shoulder Flexion AROM 134 pain at end pinching pain    Right Shoulder Flexion PROM 145 pain at end    Right Shoulder Abduction AROM 107 deg pain >90 deg   Right Shoulder Abduction PROM 120 pain at end    Right Shoulder ER AROM 57 painful at end range    Right Shoulder ER PROM WNL mild pain at the end    Right Shoulder IR AROM IR/ext L3 painful   Right Shoulder IR PROM 65 deg tight mild pain    Right Shoulder Flexion Strength all MMT of the shoulder today demonstrated strong with only pain on scaption and abduction on the right side.    Left Shoulder Flexion AROM 120, stiffness   Left Shoulder Flexion PROM 140 tight and stiff,    Left Shoulder Abduction AROM 111, tight, tingling/pin pricks    Left Shoulder Abduction PROM 120 tight and stiff   Left Shoulder ER AROM 48 stiffness, tight   Left Shoulder ER PROM min dec, minimal pain at the end    Left Shoulder IR AROM IR/ext L5 pain and stiffness   Left Shoulder IR PROM 65 deg, tightness, warm tingle feeling   Planned Therapy Interventions   Planned Therapy Interventions joint mobilization;manual therapy;neuromuscular re-education;ROM;strengthening;stretching   Planned Modality Interventions   Planned Modality Interventions Electrical stimulation;Hot packs;TENS;Ultrasound   Clinical Impression   Criteria for Skilled Therapeutic Interventions Met yes, treatment indicated   PT Diagnosis Bilateral shoulder pain, rotator cuff tendonitis,    Influenced by the following impairments pain, inflammation, decrease ROM and joint mobility    Functional limitations due to impairments reaching donning/doffing a bra, lifting and carrying, sleep   Clinical Presentation Stable/Uncomplicated   Clinical Decision Making (Complexity) Low complexity   Therapy Frequency 1 time/week   Predicted  Duration of Therapy Intervention (days/wks) 12 weeks    Risk & Benefits of therapy have been explained Yes   Patient, Family & other staff in agreement with plan of care Yes   Clinical Impression Comments Patient presents to therapy with complaints of B shoulder pain that started with a slip and fall on steps where she grabbed railing wiht right arm and pushed into the wall with the left arm to prevent falling all of the way down steps. pain increased with carrying heavier bags, and driving. Pain improved wiht heat/ice and ibuprofen. Patient still limited in reaching overhead, behind back and sleeping on the left side Patient has decreased ROM in flexion and abduction as well as IR/extnesion bilaterally, L > R but more pain on the right side with these movements Patient will benefit from skilled therapy to address limitations noted above and improve overall functional mobility and activity tolerance    Education Assessment   Preferred Learning Style Listening;Pictures/video;Reading;Demonstration   Barriers to Learning No barriers   ORTHO GOALS   PT Ortho Eval Goals 1;2;3   Ortho Goal 1   Goal Identifier HEP   Goal Description Patient will be independent in a HEP in 12 weeks   Target Date 04/06/22   Ortho Goal 2   Goal Identifier reaching behind back    Goal Description Patient will be able to reach behind back with icrease ease and less pain in order to be able to don and doff her bra in 12 weeks   Target Date 04/06/22   Ortho Goal 3   Goal Identifier sleep   Goal Description Patient will be able to sleep on either side without increase in pain waking her in 12 weeks   Target Date 04/06/22   Total Evaluation Time   PT Dana, Low Complexity Minutes (84378) 30     Riya Dickey, PT, DPT, CLT-JAVIER

## 2022-01-19 ENCOUNTER — HOSPITAL ENCOUNTER (OUTPATIENT)
Dept: PHYSICAL THERAPY | Facility: REHABILITATION | Age: 57
End: 2022-01-19
Payer: COMMERCIAL

## 2022-01-19 DIAGNOSIS — M62.81 MUSCLE WEAKNESS (GENERALIZED): ICD-10-CM

## 2022-01-19 DIAGNOSIS — M25.511 ACUTE PAIN OF BOTH SHOULDERS: Primary | ICD-10-CM

## 2022-01-19 DIAGNOSIS — M25.512 ACUTE PAIN OF BOTH SHOULDERS: Primary | ICD-10-CM

## 2022-01-19 PROCEDURE — 97110 THERAPEUTIC EXERCISES: CPT | Mod: GP | Performed by: PHYSICAL THERAPIST

## 2022-01-19 PROCEDURE — 97140 MANUAL THERAPY 1/> REGIONS: CPT | Mod: GP | Performed by: PHYSICAL THERAPIST

## 2022-01-20 ENCOUNTER — MYC MEDICAL ADVICE (OUTPATIENT)
Dept: FAMILY MEDICINE | Facility: CLINIC | Age: 57
End: 2022-01-20
Payer: COMMERCIAL

## 2022-01-20 DIAGNOSIS — J45.20 MILD INTERMITTENT ASTHMA WITHOUT COMPLICATION: Primary | ICD-10-CM

## 2022-01-20 DIAGNOSIS — J45.20 MILD INTERMITTENT ASTHMA: ICD-10-CM

## 2022-01-20 RX ORDER — MONTELUKAST SODIUM 10 MG/1
1 TABLET ORAL DAILY
Qty: 90 TABLET | Refills: 3 | Status: SHIPPED | OUTPATIENT
Start: 2022-01-20 | End: 2023-01-17

## 2022-02-01 ENCOUNTER — HOSPITAL ENCOUNTER (OUTPATIENT)
Dept: PHYSICAL THERAPY | Facility: REHABILITATION | Age: 57
End: 2022-02-01
Payer: COMMERCIAL

## 2022-02-01 DIAGNOSIS — M25.512 ACUTE PAIN OF BOTH SHOULDERS: Primary | ICD-10-CM

## 2022-02-01 DIAGNOSIS — M62.81 MUSCLE WEAKNESS (GENERALIZED): ICD-10-CM

## 2022-02-01 DIAGNOSIS — M25.511 ACUTE PAIN OF BOTH SHOULDERS: Primary | ICD-10-CM

## 2022-02-01 PROCEDURE — 97140 MANUAL THERAPY 1/> REGIONS: CPT | Mod: GP | Performed by: PHYSICAL THERAPIST

## 2022-02-01 PROCEDURE — 97110 THERAPEUTIC EXERCISES: CPT | Mod: GP | Performed by: PHYSICAL THERAPIST

## 2022-02-16 ENCOUNTER — HOSPITAL ENCOUNTER (OUTPATIENT)
Dept: PHYSICAL THERAPY | Facility: REHABILITATION | Age: 57
End: 2022-02-16
Payer: COMMERCIAL

## 2022-02-16 DIAGNOSIS — M25.512 ACUTE PAIN OF BOTH SHOULDERS: Primary | ICD-10-CM

## 2022-02-16 DIAGNOSIS — M25.511 ACUTE PAIN OF BOTH SHOULDERS: Primary | ICD-10-CM

## 2022-02-16 DIAGNOSIS — M62.81 MUSCLE WEAKNESS (GENERALIZED): ICD-10-CM

## 2022-02-16 PROCEDURE — 97140 MANUAL THERAPY 1/> REGIONS: CPT | Mod: GP | Performed by: PHYSICAL THERAPIST

## 2022-02-16 PROCEDURE — 97110 THERAPEUTIC EXERCISES: CPT | Mod: GP | Performed by: PHYSICAL THERAPIST

## 2022-03-19 ENCOUNTER — ANCILLARY PROCEDURE (OUTPATIENT)
Dept: ULTRASOUND IMAGING | Facility: HOSPITAL | Age: 57
End: 2022-03-19
Payer: COMMERCIAL

## 2022-03-19 ENCOUNTER — NURSE TRIAGE (OUTPATIENT)
Dept: NURSING | Facility: CLINIC | Age: 57
End: 2022-03-19
Payer: COMMERCIAL

## 2022-03-19 ENCOUNTER — HOSPITAL ENCOUNTER (EMERGENCY)
Facility: HOSPITAL | Age: 57
Discharge: ANOTHER HOSPITAL | End: 2022-03-19
Attending: EMERGENCY MEDICINE | Admitting: EMERGENCY MEDICINE
Payer: COMMERCIAL

## 2022-03-19 ENCOUNTER — HOSPITAL ENCOUNTER (EMERGENCY)
Facility: CLINIC | Age: 57
Discharge: HOME | End: 2022-03-19
Attending: EMERGENCY MEDICINE | Admitting: EMERGENCY MEDICINE
Payer: COMMERCIAL

## 2022-03-19 VITALS
BODY MASS INDEX: 37.21 KG/M2 | HEIGHT: 63 IN | DIASTOLIC BLOOD PRESSURE: 83 MMHG | WEIGHT: 210 LBS | TEMPERATURE: 98.5 F | HEART RATE: 91 BPM | OXYGEN SATURATION: 97 % | RESPIRATION RATE: 14 BRPM | SYSTOLIC BLOOD PRESSURE: 144 MMHG

## 2022-03-19 VITALS
RESPIRATION RATE: 20 BRPM | DIASTOLIC BLOOD PRESSURE: 93 MMHG | OXYGEN SATURATION: 97 % | HEART RATE: 85 BPM | SYSTOLIC BLOOD PRESSURE: 159 MMHG | BODY MASS INDEX: 38.85 KG/M2 | TEMPERATURE: 97.7 F | WEIGHT: 212.4 LBS

## 2022-03-19 DIAGNOSIS — H43.812 PVD (POSTERIOR VITREOUS DETACHMENT), LEFT: ICD-10-CM

## 2022-03-19 DIAGNOSIS — H43.392 FLOATERS IN VISUAL FIELD, LEFT: ICD-10-CM

## 2022-03-19 PROCEDURE — 99285 EMERGENCY DEPT VISIT HI MDM: CPT | Mod: 25

## 2022-03-19 PROCEDURE — 99281 EMR DPT VST MAYX REQ PHY/QHP: CPT

## 2022-03-19 PROCEDURE — 99282 EMERGENCY DEPT VISIT SF MDM: CPT | Performed by: EMERGENCY MEDICINE

## 2022-03-19 ASSESSMENT — ENCOUNTER SYMPTOMS
EYE PAIN: 0
ABDOMINAL PAIN: 0
HEADACHES: 0
COUGH: 0
COUGH: 0
SHORTNESS OF BREATH: 0
NAUSEA: 0
HEADACHES: 0
NERVOUS/ANXIOUS: 0
SHORTNESS OF BREATH: 0
ABDOMINAL PAIN: 0
DIARRHEA: 0
EYE PAIN: 0
PHOTOPHOBIA: 0
FLANK PAIN: 0
DIARRHEA: 0
VOMITING: 0
EYE REDNESS: 0
PHOTOPHOBIA: 0
DYSURIA: 0
FEVER: 0
LIGHT-HEADEDNESS: 0
CHILLS: 0
EYE ITCHING: 0
HEMATURIA: 0
EYE DISCHARGE: 0
EYE REDNESS: 0
FEVER: 0
EYE DISCHARGE: 0
FREQUENCY: 0
VOMITING: 0
SORE THROAT: 0
NAUSEA: 0
MYALGIAS: 0
BACK PAIN: 0
EYE ITCHING: 0

## 2022-03-19 ASSESSMENT — VISUAL ACUITY
OD: 20/30
OS: 20/25
OS: 20/20
OD: 20/25

## 2022-03-19 NOTE — CONSULTS
OPHTHALMOLOGY CONSULT NOTE  03/19/22    Patient: Jocelyn Quiroga      ASSESSMENT/PLAN:     Jocelyn Quiroga is a 56 year old female who presented with floaters x 24 hrs today:     Posterior vitreous detachment, right eye  - No vitreous hemorrhage, no retinal tears or holes on exam. No sign of a retinal detachment on 360 degree scleral depressed exam  - VA 20/20 both eyes today, no APD  - discussed return precautions  - Discussed that this is an ongoing process that can take up to 4-6 weeks to fully resolve   - will have patient follow-up later this week for repeat evaluation    Nevus, left eye  - flat, not elevated, no serous fluid  - follow-up in clinic with optos photo  - discussed importance of yearly eye exam for monitoring    Please contact the eye clinic at [phone] if any further questions or concerns arise. Clinic is located on [address] [facility].     Return for OCT Mac, OPTOS, dilated fundus exam    It is our pleasure to participate in this patient's care and treatment. Please contact us with any further questions or concerns.    Timoteo Carson MD     HISTORY OF PRESENTING ILLNESS:     Jocelyn Quiroga is a 56 year old female who presented on 03/19/22  with floaters and flashes. Floaters started last evening accompanied with flashes around 8 pm last night. Continues to have floaters. Has not seen sustained flashes since then. The floaters are the exact same. No curtain coming down on the vision. No issues in the right eye.     Floaters are right across the front, moves with eye movements. No red haze to her vision. No diplopia, no redness, discharge.     10+ review of systems were otherwise negative except for that which has been stated above.      OCULAR/MEDICAL/SURGICAL HISTORIES:     Past Ocular History:   Refractive error: yes myopic last dilated exam pre covid  Prior eye surgery/laser: no  CTL wearer: no  GTTs: yes patanol    Past Medical History:      Past Medical  History:   Diagnosis Date     Arthritis Jan 1995    not Rheumatoid- occurred during first pregnancy     Asthma     environmental and viral induced     GERD (gastroesophageal reflux disease)      Heart murmur birth    been assessed and deemed not an issue       Past Surgical History:   Procedure Laterality Date     CYST REMOVAL      on finger     WISDOM TOOTH EXTRACTION         Family History:  No history of glaucoma or macular degeneration     Social History:  Regulatory affairs with [employer]    EXAMINATION:     Base Eye Exam     Visual Acuity (Snellen - Linear)       Right Left    Near cc 20/20 20/20    Correction: Glasses          Tonometry (Tonopen, 3:59 PM)       Right Left    Pressure 16 17          Pupils       Pupils    Right PERRL    Left PERRL          Visual Fields       Left Right     Full Full          Extraocular Movement       Right Left     Full, Ortho Full, Ortho          Neuro/Psych     Oriented x3: Yes    Mood/Affect: Normal          Dilation     Both eyes: 1.0% Mydriacyl, 2.5% Frandy Synephrine @ 4:01 PM            Strabismus Exam     Observations: Ortho    Distance Near Near +3DS N Bifocals                    0 0 0   0 0 0                       0  0   0  0                       0 0 0   0 0 0                   Slit Lamp and Fundus Exam     External Exam       Right Left    External Normal Normal          Slit Lamp Exam       Right Left    Lids/Lashes Normal Normal    Conjunctiva/Sclera White and quiet White and quiet    Cornea Clear Clear    Anterior Chamber Deep and quiet Deep and quiet    Iris Round and reactive Round and reactive    Lens Early 1+ NS early 1+ NS    Vitreous Normal PVD without hemorrhage          Fundus Exam       Right Left    Disc Normal Normal    Macula flat no macular edema dbh flat no macular edema dbh    Vessels Normal Normal    Periphery 7 oclock nevus, not elevated, flat, pigmented inferiorly there is a ?elevated vitreal tuft                Labs/Studies/Imaging  Performed  none       Timoteo Carson MD  Resident Physician, PGY-2  Department of Ophthalmology  03/19/22 3:56 PM   Pager: [phone]

## 2022-03-19 NOTE — TELEPHONE ENCOUNTER
"Patient calling in today stating last night at around 8 pm she had a light shooting in the left eye. She cleaned her glasses, no change. Blurred vision with with a thin stream of light and stated its \"debris\".  Per RN protocol patient should be seen in ED now or PCP triage  SLT required.     Nurse Triage SBAR    Is this a 2nd Level Triage? YES, LICENSED PRACTITIONER REVIEW IS REQUIRED    Situation:   Patient calling in today stating last night at around 8 pm she had a light shooting in the left eye. She cleaned her glasses, no change. Blurred vision with with a thin stream of light and stated its \"debris\".    Background:   see patient problem list     Assessment:   ED as they have the proper equipment and personal to call in if needed.     Protocol Recommended Disposition:   Go to ED Now (Or PCP Triage)    Recommendation:   permission to send patient to the ER.     Paged to provider    Does the patient meet one of the following criteria for ADS visit consideration? 16+ years old, with an [facility] PCP     TIP  Providers, please consider if this condition is appropriate for management at one of our Acute and Diagnostic Services sites.     If patient is a good candidate, please use dotphrase <dot>triageresponse and select Refer to ADS to document.  Provider Recommendation Follow Up:   Reached patient/caregiver. Informed of provider's recommendations. Patient verbalized understanding and agrees with the plan.         Reason for Disposition    [1] Blurred vision or visual changes AND [2] present now AND [3] sudden onset or new (e.g., minutes, hours, days)  (Exception: seeing floaters / black specks OR previously diagnosed migraine headaches with same symptoms)    Additional Information    Negative: Weakness of the face, arm or leg on one side of the body    Negative: Followed getting substance in the eye    Negative: Foreign body or object is or was lodged in the eye    Negative: Followed an eye injury    Negative: Followed " sun lamp or sun exposure (UV keratitis)    Negative: Yellow or green discharge (pus) in the eye    Negative: Pregnant    Negative: Postpartum (from 0 to 6 weeks after delivery)    Negative: Double vision    Negative: SEVERE headache    Negative: Complete loss of vision in 1 or both eyes    Negative: Severe eye pain    Protocols used: VISION LOSS OR CHANGE-A-AH

## 2022-03-19 NOTE — ED TRIAGE NOTES
"Patient presents with seeing \"a line of light\" on the outer portion of her left eye and \"floaters\" within the eye. Onset at 8 pm last night.   "

## 2022-03-19 NOTE — ED NOTES
Patient ambulatory from department with . Has transfer paperwork. Heading straight to [facility] to see ophthalmologist. No change in condition. Appears in no acute distress.

## 2022-03-19 NOTE — DISCHARGE INSTRUCTIONS
Please go right away to the [facility] emergency department.    Addresses [address] [facility].    Do not eat or drink until you are evaluated by the ophthalmologist    I spoke with Dr. Rodrigez who works in the ED and Dr. Emerson the ophthalmologist.

## 2022-03-19 NOTE — ED PROVIDER NOTES
"Emergency Department Staff Physician Note     I had a face to face encounter with this patient seen by the Advanced Practice Provider (LAMIN).  I have seen, examined, and discussed the patient with the LAMIN and agree with their assessment and plan of management.    I, Kristal Gauthier, am serving as a scribe to document services personally performed by Emma Heard MD, based on my observations and the provider's statements to me.     Relevant HPI:     Jocelyn Quiroga is a 56 year old female who presents to the Emergency Department for evaluation of a vision problem. She has had floaters and a \"line of light\" in the temporal portion of the visual field of her left eye. This was onset around 8 PM last night.    I, Emma Heard MD, attest that Kristal Gauthier was acting in a scribe capacity, has observed my performance of the services and has documented them in accordance with my direction.    ED Course:  12:23 PM I received the patient report from the LAMIN (Gisel Gil PA-C). I agree with her assessment and plan of management, and I will see the patient myself. Gisel performed ocular ultrasound which showed floating aspect of posterior retina. Per discussion with ophthalmology, plan to transfer patient ED to ED to the [facility] for likely retinal detachment.  12:30 PM I met with the patient to introduce myself, gather additional history, perform my initial exam, and discuss the plan.   Exam:  BP (!) 158/86   Pulse 97   Temp 97.7  F (36.5  C) (Oral)   Resp 16   Wt 96.3 kg (212 lb 6.4 oz)   SpO2 96%   BMI 38.85 kg/m       HEENT: PERRLA  Constitutional: No distress apparent     Impression / ED Plan:  Jocelyn Quiroga is a 56 year old female presents to the ED for evaluation of sudden vision change with floater and US with possible retinal detachment when I viewed images. Per ophtalmology, transfer pt to [facility] and patient and  amenable to plan with  driving. EMTALA form filled out. PA " SP spoke with ED physician at [facility] for ED to ED transfer to facilitate emergent opthalmology consultation.    Please refer to the Advanced Practice Provider's note for further details and ED course. Agree with history, plan and disposition.            I reviewed all general radiology procedures.  I was present for the key portions of this procedure: POC US eye    Emma Heard MD  Staff Physician  [facility] Emergency Department     Emma Heard MD  03/19/22 5561

## 2022-03-19 NOTE — ED PROVIDER NOTES
Emergency Department Encounter   NAME: Jocelyn Quiroga ; AGE: 56 year old female ; YOB: 1965 ; MRN: [id number] ; EVALUATION DATE & TIME: 3/19/2022 11:36 AM ; PCP: Sarah Santiago   ED PROVIDER: Gisel Gil PA-C    Chief Complaint   Patient presents with     Eye Problem       Medical Decision Making & Final Diagnosis     1. Floaters in visual field, left         ED Course as of 03/19/22 1533   Sat Mar 19, 2022   1247 Jocelyn Quiroga is a 56 year old female with a relevant PMH of near sightedness and wearing corrective lenses who presents to the ED for evaluation of visual floaters.     My exam is notable for mildly elevated BP and otherwise nl vital signs in a nontoxic appearing woman. PERRL, conjunctivae nl. EOMs intact. Visual acuity R 20/30 L 20/20. No periorbital edema or erythema. No proptosis. Occular ultrasound concerning for vitreous hemorrhage vs retinal detachment. Optic nerve appears normal   1530 I considered a broad differential including acute angle-closure glaucoma, conjunctivitis, corneal abrasion, retinal detachment, retinal artery or vein thrombosis or embolism, vitreous hemorrhage, foreign body, and others   1531 Given history and presentation most concerning for retinal detachment versus vitreous hemorrhage.  I did perform bedside ultrasound which may be concerning for a small retinal detachment.  Visual acuity intact here.  Neuro exam is normal.  No curtain distending concerning for retinal thromboembolism.  She has no eye pain, conjunctival injection, pupillary deficit concerning for acute angle-closure glaucoma.  No indication to obtain IOPs or slit lab exam. Discussed with ophthalmology at the [facility] and they are agreeable ED to ED transfer.  Updated Dr. Rodrigez regarding the patient's arrival.  Patient last ate and drink around 8:30 AM.  Reviewed n.p.o.  I do think she is appropriate to go by private vehicle with her  driving.  They are agreeable with  the plan as written.            ED Course   11:41 AM I met and introduced myself to the patient. I gathered initial history and performed my physical exam. We discussed plan for initial workup.   I did see the patient while wearing full COVID-compliant PPE.  12:11 PM Discussed plan for ophthalmology consult   12:21 PM Discussed with Dr. Emerson wants transfer to [facility]   12:34 PM Discussed with Dr. Rodrigez  12:36 PM updated Patient and her       MEDICATIONS GIVEN IN THE EMERGENCY:   Medications - No data to display   NEW PRESCRIPTIONS STARTED AT TODAY'S ER VISIT:  Discharge Medication List as of 3/19/2022  1:08 PM        =================================================================   History   Patient information was obtained from: patient   Use of Intrepreter: N/A   Jocelyn RENAE Junaid is a 56 year old female with a relevant PMH of near sightedness and wearing corrective lenses who presents to the ED for evaluation of visual floaters.   Pt reports sudden onset light flasing linearly in the lateral aspect of her L eye around 8 pm. This has happened 2 times since then but resolves quickly. At the same time, she developed a fixed floater over her vision which has been persistent. Last oral intake 8:30a with breakfast, no water since that time.  Denies other complaints. No numbness, tingling, or other focal deficit. No curtain coming down over vision. No eye pain or discharge. No trauma to eye. Denies history of migraine or headache. Has not seen a eye doctor for a long time.  ______________________________________________________________________  Past Medical History:   Diagnosis Date     Arthritis Jan 1995     Asthma      GERD (gastroesophageal reflux disease)      Heart murmur birth        Past Surgical History:   Procedure Laterality Date     CYST REMOVAL      on finger     WISDOM TOOTH EXTRACTION         Family History   Problem Relation Age of Onset     Hyperlipidemia Mother      Thyroid Disease  Mother         Graves     Hypertension Mother      Diabetes Father      Alzheimer Disease Father      Alcoholism Father      Dementia Father      Vision Loss Father      Thyroid Disease Sister         hypothyroidism     No Known Problems Brother      Allergies Daughter      Thyroid Disease Maternal Grandmother         Graves     Vision Loss Maternal Grandmother      Hyperlipidemia Maternal Grandmother      Pancreatic Cancer Paternal Grandmother      Cancer Paternal Grandmother         pancreas     Cerebrovascular Disease Paternal Grandfather      No Known Problems Daughter      Heart Disease Paternal Uncle      Arthritis Paternal Aunt      Colon Cancer Paternal Aunt      Heart Disease Paternal Uncle      Heart Disease Paternal Uncle      Heart Disease Paternal Uncle        Social History     Tobacco Use     Smoking status: Never Smoker     Smokeless tobacco: Never Used   Substance Use Topics     Alcohol use: Yes     Alcohol/week: 4.0 standard drinks     Comment: Alcoholic Drinks/day: 4-5 times week     Drug use: No       REVIEW OF SYSTEMS:    Review of Systems   Constitutional: Negative for chills and fever.   HENT: Negative for sore throat.    Eyes: Positive for visual disturbance. Negative for photophobia, pain, discharge, redness and itching.   Respiratory: Negative for cough and shortness of breath.    Cardiovascular: Negative for chest pain.   Gastrointestinal: Negative for abdominal pain, diarrhea, nausea and vomiting.   Genitourinary: Negative for dysuria, frequency, hematuria and urgency.   Musculoskeletal: Negative for myalgias.   Skin: Negative for rash.   Neurological: Negative for light-headedness and headaches.   Psychiatric/Behavioral: The patient is not nervous/anxious.    All other systems reviewed and are negative.        Physical Exam   BP (!) 159/93   Pulse 85   Temp 97.7  F (36.5  C) (Oral)   Resp 20   Wt 96.3 kg (212 lb 6.4 oz)   SpO2 97%   BMI 38.85 kg/m      Physical  Exam  Constitutional:       General: She is not in acute distress.     Appearance: Normal appearance.   HENT:      Head: Normocephalic.   Eyes:      Comments: PERRL, conjunctivae nl. EOMs intact. Visual acuity R 20/30 L 20/20. No periorbital edema or erythema. No proptosis. Occular ultrasound concerning for vitreous hemorrhage vs retinal detachment. Optic nerve appears normal   Cardiovascular:      Rate and Rhythm: Normal rate and regular rhythm.      Heart sounds: Normal heart sounds.   Pulmonary:      Effort: Pulmonary effort is normal. No respiratory distress.      Breath sounds: Normal breath sounds. No wheezing, rhonchi or rales.   Abdominal:      General: There is no distension.   Musculoskeletal:         General: No swelling or deformity. Normal range of motion.      Cervical back: Normal range of motion.      Right lower leg: No edema.      Left lower leg: No edema.   Skin:     General: Skin is warm.   Neurological:      General: No focal deficit present.      Mental Status: She is alert.   Psychiatric:         Mood and Affect: Mood normal.         Lab Work (Reviewed and Interpreted):   Labs Ordered and Resulted from Time of ED Arrival to Time of ED Departure - No data to display    Imaging (Reviewed and Interpreted):   POC US SOFT TISSUE    (Results Pending)       PROCEDURES:   Bedside ultrasound: Concern for vitreous hemorrhage versus retinal detachment.  Optic nerve appears normal.      Gisel Gil PA-C   Emergency Medicine   [facility] EMERGENCY DEPARTMENT  [facility] [facility] [id number] [phone]  Dept: [phone]          Gisel Gil PA-C  03/19/22 1538

## 2022-03-19 NOTE — ED PROVIDER NOTES
ED Provider Note  [facility]    Chief complaint: Transfer for concern for left-sided retinal detachment  History         HPI  Jocelyn Quiroga is a 56 year old female who presents to the emergency department after being transferred here for ophthalmology evaluation for concern for retinal detachment.  Patient reports that last night around 8 PM she had sudden onset of seeing floaters in a light flickering like a light switch going off on the left side of her vision.  She had no prior episodes of this in the past.  She went to bed and awoke this morning with continued floaters in her vision.  She presented to an outside hospital emergency department where she underwent work-up including bedside ultrasound which showed concern for retinal detachment.  They consulted our ophthalmologist here who recommended an ED to ED transfer for further evaluation.  She denies any history of diabetes, headache, no nausea or vomiting.  She wears corrective lenses that she describes as progressive for both near and distance vision correction.  She has no history of other eye pathology or ophthalmologic surgery.    Past Medical History  Past Medical History:   Diagnosis Date     Arthritis Jan 1995    not Rheumatoid- occurred during first pregnancy     Asthma     environmental and viral induced     GERD (gastroesophageal reflux disease)      Heart murmur birth    been assessed and deemed not an issue     Past Surgical History:   Procedure Laterality Date     CYST REMOVAL      on finger     WISDOM TOOTH EXTRACTION       albuterol (PROAIR HFA;PROVENTIL HFA;VENTOLIN HFA) 90 mcg/actuation inhaler  albuterol (PROVENTIL) 2.5 mg /3 mL (0.083 %) nebulizer solution  aspirin 325 MG tablet  cholecalciferol, vitamin D3, (VITAMIN D3) 2,000 unit cap  loratadine (CLARITIN) 10 mg tablet  montelukast (SINGULAIR) 10 MG tablet  olopatadine (PATANOL) 0.1 % ophthalmic solution      Allergies   Allergen Reactions     Sulfa  (Sulfonamide Antibiotics) [Sulfa Drugs] Hives and Other (See Comments)     Turns red, hives, hot     Sulfamethoxazole-Trimethoprim [Sulfamethoxazole W/Trimethoprim] Other (See Comments)     Patient was very swollen on day 9 on antibiotic     Family History  Family History   Problem Relation Age of Onset     Hyperlipidemia Mother      Thyroid Disease Mother         Graves     Hypertension Mother      Diabetes Father      Alzheimer Disease Father      Alcoholism Father      Dementia Father      Vision Loss Father      Thyroid Disease Sister         hypothyroidism     No Known Problems Brother      Allergies Daughter      Thyroid Disease Maternal Grandmother         Graves     Vision Loss Maternal Grandmother      Hyperlipidemia Maternal Grandmother      Pancreatic Cancer Paternal Grandmother      Cancer Paternal Grandmother         pancreas     Cerebrovascular Disease Paternal Grandfather      No Known Problems Daughter      Heart Disease Paternal Uncle      Arthritis Paternal Aunt      Colon Cancer Paternal Aunt      Heart Disease Paternal Uncle      Heart Disease Paternal Uncle      Heart Disease Paternal Uncle      Social History   Social History     Tobacco Use     Smoking status: Never Smoker     Smokeless tobacco: Never Used   Substance Use Topics     Alcohol use: Yes     Alcohol/week: 4.0 standard drinks     Comment: Alcoholic Drinks/day: 4-5 times week     Drug use: No      Past medical history, past surgical history, medications, allergies, family history, and social history were reviewed with the patient. No additional pertinent items.       Review of Systems   Constitutional: Negative for fever.   HENT: Negative for congestion.    Eyes: Positive for visual disturbance. Negative for photophobia, pain, discharge, redness and itching.   Respiratory: Negative for cough and shortness of breath.    Cardiovascular: Negative for chest pain.   Gastrointestinal: Negative for abdominal pain, diarrhea, nausea and  "vomiting.   Genitourinary: Negative for flank pain.   Musculoskeletal: Negative for back pain.   Skin: Negative for rash.   Neurological: Negative for headaches.     A complete review of systems was performed with pertinent positives and negatives noted in the HPI, and all other systems negative.    Physical Exam   BP: (!) 144/83  Pulse: 91  Temp: 98.5  F (36.9  C)  Resp: 14  Height: 160 cm (5' 3\")  Weight: 95.3 kg (210 lb)  SpO2: 97 %  Physical Exam  Constitutional:       General: She is awake. She is not in acute distress.     Appearance: Normal appearance. She is well-developed. She is not ill-appearing or toxic-appearing.   HENT:      Head: Atraumatic.      Mouth/Throat:      Pharynx: No oropharyngeal exudate.   Eyes:      General: No scleral icterus.     Extraocular Movements: Extraocular movements intact.      Conjunctiva/sclera: Conjunctivae normal.      Pupils: Pupils are equal, round, and reactive to light.      Visual Fields: Left eye visual fields normal.      Comments: Able to read  From my badge with her left eye.   Cardiovascular:      Rate and Rhythm: Normal rate and regular rhythm.      Heart sounds: Normal heart sounds, S1 normal and S2 normal.   Pulmonary:      Effort: No respiratory distress.      Breath sounds: Normal breath sounds.   Abdominal:      General: Bowel sounds are normal.      Palpations: Abdomen is soft.      Tenderness: There is no abdominal tenderness.   Musculoskeletal:         General: No tenderness.   Skin:     General: Skin is warm.      Findings: No rash.   Neurological:      Mental Status: She is alert and oriented to person, place, and time.   Psychiatric:         Attention and Perception: Attention normal.         Mood and Affect: Mood normal.         Speech: Speech normal.         Behavior: Behavior normal. Behavior is cooperative.         ED Course      Procedures                     No results found for any visits on 03/19/22.  Medications - No data to display   "   Assessments & Plan (with Medical Decision Making)   Jocelyn Quiroga is a 56 year old female who presents to the emergency department after being transferred here for ophthalmology evaluation for concern for retinal detachment.  Concerning for retinal detachment versus vitreous detachment certainly.  Will hold off on a labs or imaging at this time as they are unlikely to be useful.  Ophthalmology consulted, appreciate their recommendations.    I have reviewed the nursing notes. I have reviewed the findings, diagnosis, plan and need for follow up with the patient.    Ophthalmology evaluated her and based on their exam, patient is experiencing a posterior vitreous detachment.  No evidence of retinal detachment.  No indication for emergent intervention or medication at this time.  Patient will need to follow-up in outpatient ophthalmology clinic later this week.  Referral placed.  No further emergency work-up indicated at this time.  Okay to discharge with return ED precautions given.    Discharge Medication List as of 3/19/2022  4:39 PM          Final diagnoses:   PVD (posterior vitreous detachment), left       --  Todd Rodrigez MD PhD  [facility] EMERGENCY DEPARTMENT  3/19/2022     Todd Rodrigez MD  03/19/22 2446

## 2022-03-19 NOTE — ED TRIAGE NOTES
new floaters since 8 PM last night of her right eye.  Visual acuity 20/30.  Ultrasound with concern for retinal detachment.  Ophthalmology aware, doing ED to ED transfer

## 2022-03-21 ENCOUNTER — TELEPHONE (OUTPATIENT)
Dept: OPHTHALMOLOGY | Facility: CLINIC | Age: 57
End: 2022-03-21
Payer: COMMERCIAL

## 2022-03-21 NOTE — TELEPHONE ENCOUNTER
Called and spoke to Elis     Made her an appointment with DR. Carson for 3/24 @ 8 am for V, T, dilate, optos photo (with shots on inferior retina as well), B scan     Pt is aware of location of clinic     Brittany Olivera Communication Facilitator on 3/21/2022 at 11:47 AM

## 2022-03-21 NOTE — TELEPHONE ENCOUNTER
Called and LVM for Elis to call me in regards to an appointment with Dr. Carson for Wed for Wednesday morning. V, T, dilate, optos photo (with shots on inferior retina as well), B scan     Brittany Olivera Communication Facilitator on 3/21/2022 at 8:43 AM

## 2022-03-24 ENCOUNTER — OFFICE VISIT (OUTPATIENT)
Dept: OPHTHALMOLOGY | Facility: CLINIC | Age: 57
End: 2022-03-24
Attending: OPHTHALMOLOGY
Payer: COMMERCIAL

## 2022-03-24 DIAGNOSIS — H43.812 PVD (POSTERIOR VITREOUS DETACHMENT), LEFT: ICD-10-CM

## 2022-03-24 PROCEDURE — 99203 OFFICE O/P NEW LOW 30 MIN: CPT | Mod: GC | Performed by: STUDENT IN AN ORGANIZED HEALTH CARE EDUCATION/TRAINING PROGRAM

## 2022-03-24 PROCEDURE — 92250 FUNDUS PHOTOGRAPHY W/I&R: CPT | Performed by: STUDENT IN AN ORGANIZED HEALTH CARE EDUCATION/TRAINING PROGRAM

## 2022-03-24 PROCEDURE — G0463 HOSPITAL OUTPT CLINIC VISIT: HCPCS | Mod: 25

## 2022-03-24 PROCEDURE — 76512 OPH US DX B-SCAN: CPT | Performed by: STUDENT IN AN ORGANIZED HEALTH CARE EDUCATION/TRAINING PROGRAM

## 2022-03-24 ASSESSMENT — EXTERNAL EXAM - RIGHT EYE: OD_EXAM: NORMAL

## 2022-03-24 ASSESSMENT — VISUAL ACUITY
OS_CC: 20/20
METHOD: SNELLEN - LINEAR
OD_CC+: -1
CORRECTION_TYPE: GLASSES
OD_CC: 20/20
OS_CC+: -1

## 2022-03-24 ASSESSMENT — CONF VISUAL FIELD
OS_NORMAL: 1
OD_NORMAL: 1
METHOD: COUNTING FINGERS

## 2022-03-24 ASSESSMENT — REFRACTION_WEARINGRX
OD_SPHERE: -5.25
OS_AXIS: 092
OD_AXIS: 105
OD_ADD: +2.00
SPECS_TYPE: PAL
OD_CYLINDER: +0.50
OS_CYLINDER: +1.00
OS_SPHERE: -5.50
OS_ADD: +2.00

## 2022-03-24 ASSESSMENT — TONOMETRY
OS_IOP_MMHG: 20
OD_IOP_MMHG: 19
IOP_METHOD: TONOPEN

## 2022-03-24 ASSESSMENT — SLIT LAMP EXAM - LIDS
COMMENTS: NORMAL
COMMENTS: NORMAL

## 2022-03-24 ASSESSMENT — EXTERNAL EXAM - LEFT EYE: OS_EXAM: NORMAL

## 2022-03-24 NOTE — PROGRESS NOTES
"Ophthalmology Acute Clinic     Chief Complaint(s) and History of Present Illness(es)     Floaters     In left eye.  Duration of days.  Associated symptoms include headache (last couple days, \"more self induced\" ) and flashes (flickering OS).  Negative for peripheral vision loss, shade and blurred vision.              Comments     Patient reports that on 3/18/22 around 8 PM she had sudden onset of seeing floaters and a light flickering like a light switch going off on the left side of her vision.  She had no prior episodes of this in the past.  She has continued floaters in her vision. She says she notes it's more noticeable in the evening and right away in the morning. \"I don't notice as much during the day.\" Patient notes an occasional \"blob\" left eye, but it can move or go away. Denies eye surgery. Denies eye trauma. Denies eye pain. Patient notes a lot of tearing.     Cheryl Romero, COT 8:07 AM 03/24/2022                    HPI:   Jocelyn Quiroga is a 56 year old female who presents for follow up new PVD, left eye.     Past Ocular history:   - Glasses: yes  - Contact lens wear: no  - Ocular Surgical History: no  - Current Eye drops: patanol PRN    PMH: arthritis, GERD    FH: - glaucoma or - AMD.     Review of systems for the eyes was negative other than the pertinent positives/negatives listed in the HPI.      Imaging:     Optos  right eye: normal scan no detachment or peripheral tears  left eye: PVD, small nevus, no retinal tears or detachments    B Scan:   left eye: incomplete posterior vitreous detachment    Assessment & Plan      Jocelyn Quiroga is a 56 year old female with the following diagnoses:   1. PVD (posterior vitreous detachment), left       Symptoms are the same from this weekend. Here for B scan and Optos photo. No signs of retinal detachments or tears. No new symptoms.       Patient disposition:   Return in about 4 weeks (around 4/21/2022) for PVD fu, VTD.    Patient seen with Dr. Ferrer " Clara Carson MD  Resident Physician, PGY-2  Department of Ophthalmology  03/24/22 8:17 AM    Attestation:  I have seen and examined the patient with Dr. Timoteo Carson MD and agree with the findings in this note, as well as the interpretations of the diagnostic tests.      Arely Da Silva MD PhD.  Professor & Chair

## 2022-03-24 NOTE — NURSING NOTE
"Chief Complaints and History of Present Illnesses   Patient presents with     Floaters     Chief Complaint(s) and History of Present Illness(es)     Floaters     Laterality: left eye    Duration: days    Associated symptoms: headache (last couple days, \"more self induced\" ) and flashes (flickering OS).  Negative for peripheral vision loss, shade and blurred vision              Comments     Patient reports that on 3/18/22 around 8 PM she had sudden onset of seeing floaters and a light flickering like a light switch going off on the left side of her vision.  She had no prior episodes of this in the past.  She has continued floaters in her vision. She says she notes it's more noticeable in the evening and right away in the morning. \"I don't notice as much during the day.\" Patient notes an occasional \"blob\" left eye, but it can move or go away. Denies eye surgery. Denies eye trauma. Denies eye pain. Patient notes a lot of tearing.     Cheryl Romero, COT 8:07 AM 03/24/2022                "

## 2022-04-21 ENCOUNTER — NURSE TRIAGE (OUTPATIENT)
Dept: FAMILY MEDICINE | Facility: CLINIC | Age: 57
End: 2022-04-21
Payer: COMMERCIAL

## 2022-04-21 ENCOUNTER — MYC MEDICAL ADVICE (OUTPATIENT)
Dept: FAMILY MEDICINE | Facility: CLINIC | Age: 57
End: 2022-04-21
Payer: COMMERCIAL

## 2022-04-21 NOTE — TELEPHONE ENCOUNTER
Nurse triage done on this patient. Patient made appt for 04/26 to be seen. Advised over the weekend if symptoms gets worse to be seen right away. Patient agrees with plan and will do so.

## 2022-04-21 NOTE — TELEPHONE ENCOUNTER
Mychart response back to patient using Ear - [id number] protocol. Will wait for response and go from there.

## 2022-04-21 NOTE — TELEPHONE ENCOUNTER
"Patient traveled to [facility] and just got back on Sunday. Patient started to feel ear fullness and some drainage.  Will take a home test tomorrow for covid but has appt set up for 04/26/2022 with Fani.     Reason for Disposition    Hearing loss (complete or partial) is the main symptom    Decreased hearing in 1 ear and gradual onset    Additional Information    Negative: Recurrent episodes of hearing loss, dizziness, and ringing in the ear    Negative: Tinnitus (ringing, hissing, beating) and only or mainly in 1 ear    Negative: Tinnitus (ringing, hissing, beating) and interferes with work, school, or sleep    Answer Assessment - Initial Assessment Questions  1. LOCATION: \"Which ear is involved?\"        Left ear  2. SENSATION: \"Describe how the ear feels.\"       Full  3. ONSET:  \"When did the ear symptoms start?\"        Thursday (04/14/2022)  4. PAIN: \"Do you also have an earache?\" If so, ask: \"How bad is it?\" (Scale 1-10; or mild, moderate, severe)      No  5. CAUSE: \"What do you think is causing the ear congestion?\"      Unknown  6. URI: \"Do you have a runny nose or cough?\"       Yes to both  7. NASAL ALLERGIES: \"Are there symptoms of hay fever, such as sneezing or a clear nasal discharge?\"      Unkown  8. PREGNANCY: \"Is there any chance you are pregnant?\" \"When was your last menstrual period?\"      Unknown    Protocols used: EAR - CONGESTION-A-OH, HEARING LOSS OR CHANGE-A-OH      "

## 2022-04-25 NOTE — PROGRESS NOTES
"  Assessment & Plan     Left otitis media with spontaneous rupture of eardrum  Eustachian tube dysfunction, left ear  Evidence of otitis media of the left side with small tympanic membrane perforation.  Will treat with Augmentin twice daily for 10 days.  We discussed continuing allergy medication including Claritin, fluticasone nasal spray and Sudafed for eustachian tube dysfunction.  She will monitor for improvement of symptoms and return to clinic if there is any concern persisting or worsening symptoms.  - amoxicillin-clavulanate (AUGMENTIN) 875-125 MG tablet  Dispense: 20 tablet; Refill: 0       BMI:   Estimated body mass index is 36.85 kg/m  as calculated from the following:    Height as of this encounter: 1.6 m (5' 3\").    Weight as of this encounter: 94.3 kg (208 lb).       No follow-ups on file.    ANIKET Jaimes CNP  [facility] KVNG Gillis is a 56 year old who presents for the following health issues: Left ear concerns    HPI   Patient is here today for evaluation of left ear.  She describes decreased hearing, sensation of ear fullness and clear drainage occurring over the last 2 weeks or so.  She first noticed symptoms of fullness and discomfort on 4/14.  She traveled out east for a [place] the following day on 4/15.  Symptoms seem to significantly worsen after the plane ride.  She did not have pain per se but had a lot of pressure and fullness and felt that that her ear was swollen.  In the week following she did not feel 100%.  She describes having a headache, feeling fatigued and a lot of congestion, ear popping etc.  She took a COVID test at home which was negative.  She recalls feeling a popping of her left ear at 1 point she also noticed some clear drainage coming from the left ear around this time.  She denies any fevers, chills, body aches or respiratory symptoms.  She still has decreased hearing in the left side and a sensation of something in her ear " "canal.    Review of Systems   Review of Systems - pertinent positives noted in HPI, otherwise ROS is negative.        Objective    /80   Pulse 78   Temp 98  F (36.7  C) (Oral)   Resp 18   Ht 1.6 m (5' 3\")   Wt 94.3 kg (208 lb)   SpO2 98%   BMI 36.85 kg/m    Body mass index is 36.85 kg/m .  Physical Exam     General Appearance:  Alert, cooperative, no distress, appears stated age   HEENT:   Moist mucous membranes.  Posterior pharynx is clear.  Right tympanic membrane and ear canal appear normal.  Left tympanic membrane is erythematous with a small perforation noted.  Left ear canal appears normal   Neck: Supple, symmetrical, no adenopathy.   Lungs:   Clear to auscultation bilaterally, respirations unlabored.  No expiratory wheeze or inspiratory crackles noted.   Heart:  Regular rate and rhythm, S1, S2 normal, no murmur, rub or gallop   Skin: Warm, dry.  Skin color, texture, turgor normal, no rashes or lesions                   "

## 2022-04-26 ENCOUNTER — OFFICE VISIT (OUTPATIENT)
Dept: FAMILY MEDICINE | Facility: CLINIC | Age: 57
End: 2022-04-26
Payer: COMMERCIAL

## 2022-04-26 VITALS
TEMPERATURE: 98 F | HEART RATE: 78 BPM | DIASTOLIC BLOOD PRESSURE: 80 MMHG | WEIGHT: 208 LBS | BODY MASS INDEX: 36.86 KG/M2 | OXYGEN SATURATION: 98 % | HEIGHT: 63 IN | RESPIRATION RATE: 18 BRPM | SYSTOLIC BLOOD PRESSURE: 126 MMHG

## 2022-04-26 DIAGNOSIS — H72.92 LEFT OTITIS MEDIA WITH SPONTANEOUS RUPTURE OF EARDRUM: Primary | ICD-10-CM

## 2022-04-26 DIAGNOSIS — Z11.4 SCREENING FOR HIV (HUMAN IMMUNODEFICIENCY VIRUS): ICD-10-CM

## 2022-04-26 DIAGNOSIS — H66.92 LEFT OTITIS MEDIA WITH SPONTANEOUS RUPTURE OF EARDRUM: Primary | ICD-10-CM

## 2022-04-26 DIAGNOSIS — H69.92 DYSFUNCTION OF LEFT EUSTACHIAN TUBE: ICD-10-CM

## 2022-04-26 DIAGNOSIS — Z12.4 CERVICAL CANCER SCREENING: ICD-10-CM

## 2022-04-26 DIAGNOSIS — Z11.59 NEED FOR HEPATITIS C SCREENING TEST: ICD-10-CM

## 2022-04-26 DIAGNOSIS — Z12.31 VISIT FOR SCREENING MAMMOGRAM: ICD-10-CM

## 2022-04-26 PROCEDURE — 99213 OFFICE O/P EST LOW 20 MIN: CPT | Performed by: NURSE PRACTITIONER

## 2022-04-26 ASSESSMENT — PAIN SCALES - GENERAL: PAINLEVEL: SEVERE PAIN (6)

## 2022-04-27 ENCOUNTER — MYC MEDICAL ADVICE (OUTPATIENT)
Dept: FAMILY MEDICINE | Facility: CLINIC | Age: 57
End: 2022-04-27
Payer: COMMERCIAL

## 2022-04-27 DIAGNOSIS — H66.92 LEFT OTITIS MEDIA WITH SPONTANEOUS RUPTURE OF EARDRUM: ICD-10-CM

## 2022-04-27 DIAGNOSIS — H72.92 LEFT OTITIS MEDIA WITH SPONTANEOUS RUPTURE OF EARDRUM: ICD-10-CM

## 2022-04-27 DIAGNOSIS — H69.92 DYSFUNCTION OF LEFT EUSTACHIAN TUBE: Primary | ICD-10-CM

## 2022-04-28 RX ORDER — CEFDINIR 300 MG/1
300 CAPSULE ORAL 2 TIMES DAILY
Qty: 20 CAPSULE | Refills: 0 | Status: SHIPPED | OUTPATIENT
Start: 2022-04-28 | End: 2022-05-08

## 2022-05-09 ENCOUNTER — MYC MEDICAL ADVICE (OUTPATIENT)
Dept: FAMILY MEDICINE | Facility: CLINIC | Age: 57
End: 2022-05-09
Payer: COMMERCIAL

## 2022-05-09 DIAGNOSIS — H69.92 DYSFUNCTION OF LEFT EUSTACHIAN TUBE: Primary | ICD-10-CM

## 2022-05-09 DIAGNOSIS — H91.92 DECREASED HEARING OF LEFT EAR: ICD-10-CM

## 2022-05-09 DIAGNOSIS — H72.92 LEFT OTITIS MEDIA WITH SPONTANEOUS RUPTURE OF EARDRUM: ICD-10-CM

## 2022-05-09 DIAGNOSIS — H66.92 LEFT OTITIS MEDIA WITH SPONTANEOUS RUPTURE OF EARDRUM: ICD-10-CM

## 2022-05-18 ENCOUNTER — OFFICE VISIT (OUTPATIENT)
Dept: FAMILY MEDICINE | Facility: CLINIC | Age: 57
End: 2022-05-18
Payer: COMMERCIAL

## 2022-05-18 VITALS
DIASTOLIC BLOOD PRESSURE: 90 MMHG | WEIGHT: 207.9 LBS | HEART RATE: 77 BPM | OXYGEN SATURATION: 98 % | SYSTOLIC BLOOD PRESSURE: 128 MMHG | TEMPERATURE: 98.3 F | BODY MASS INDEX: 36.83 KG/M2

## 2022-05-18 DIAGNOSIS — H90.72 MIXED CONDUCTIVE AND SENSORINEURAL HEARING LOSS OF LEFT EAR, UNSPECIFIED HEARING STATUS ON CONTRALATERAL SIDE: ICD-10-CM

## 2022-05-18 DIAGNOSIS — H66.90 ACUTE OTITIS MEDIA, UNSPECIFIED OTITIS MEDIA TYPE: ICD-10-CM

## 2022-05-18 DIAGNOSIS — H69.92 DYSFUNCTION OF LEFT EUSTACHIAN TUBE: Primary | ICD-10-CM

## 2022-05-18 PROCEDURE — 99213 OFFICE O/P EST LOW 20 MIN: CPT | Performed by: FAMILY MEDICINE

## 2022-05-18 RX ORDER — IBUPROFEN 200 MG
400 TABLET ORAL EVERY 4 HOURS PRN
COMMUNITY

## 2022-05-18 RX ORDER — PSEUDOEPHEDRINE HCL 30 MG
TABLET ORAL EVERY 4 HOURS PRN
COMMUNITY
End: 2022-07-06

## 2022-05-18 RX ORDER — FLUTICASONE PROPIONATE 50 MCG
1 SPRAY, SUSPENSION (ML) NASAL DAILY
COMMUNITY
End: 2022-07-06

## 2022-05-18 RX ORDER — ACETAMINOPHEN 500 MG
500-1000 TABLET ORAL EVERY 6 HOURS PRN
COMMUNITY

## 2022-05-18 ASSESSMENT — ASTHMA QUESTIONNAIRES: ACT_TOTALSCORE: 24

## 2022-05-18 NOTE — LETTER
My Asthma Action Plan    Name: Jocelyn Quiroga   YOB: 1965  Date: 5/18/2022   My doctor: Sarah Santiago MD   My clinic: [facility]        My Rescue Medicine:   Albuterol inhaler (Proair/Ventolin/Proventil HFA)  2-4 puffs EVERY 4 HOURS as needed. Use a spacer if recommended by your provider.   My Asthma Severity:   Intermittent / Exercise Induced  Know your asthma triggers: .             GREEN ZONE   Good Control    I feel good    No cough or wheeze    Can work, sleep and play without asthma symptoms       Take your asthma control medicine every day.     1. If exercise triggers your asthma, take your rescue medication    15 minutes before exercise or sports, and    During exercise if you have asthma symptoms  2. Spacer to use with inhaler: If you have a spacer, make sure to use it with your inhaler             YELLOW ZONE Getting Worse  I have ANY of these:    I do not feel good    Cough or wheeze    Chest feels tight    Wake up at night   1. Keep taking your Green Zone medications  2. Start taking your rescue medicine:    every 20 minutes for up to 1 hour. Then every 4 hours for 24-48 hours.  3. If you stay in the Yellow Zone for more than 12-24 hours, contact your doctor.  4. If you do not return to the Green Zone in 12-24 hours or you get worse, start taking your oral steroid medicine if prescribed by your provider.           RED ZONE Medical Alert - Get Help  I have ANY of these:    I feel awful    Medicine is not helping    Breathing getting harder    Trouble walking or talking    Nose opens wide to breathe       1. Take your rescue medicine NOW  2. If your provider has prescribed an oral steroid medicine, start taking it NOW  3. Call your doctor NOW  4. If you are still in the Red Zone after 20 minutes and you have not reached your doctor:    Take your rescue medicine again and    Call 911 or go to the emergency room right away    See your regular doctor within 2  weeks of an Emergency Room or Urgent Care visit for follow-up treatment.          Annual Reminders:  Meet with Asthma Educator,  Flu Shot in the Fall, consider Pneumonia Vaccination for patients with asthma (aged 19 and older).    Pharmacy: [facility] [id number] [place] [address]    Electronically signed by Sarah Santiago MD   Date: 05/18/22                    Asthma Triggers  How To Control Things That Make Your Asthma Worse    Triggers are things that make your asthma worse.  Look at the list below to help you find your triggers and   what you can do about them. You can help prevent asthma flare-ups by staying away from your triggers.      Trigger                                                          What you can do   Cigarette Smoke  Tobacco smoke can make asthma worse. Do not allow smoking in your home, car or around you.  Be sure no one smokes at a child s day care or school.  If you smoke, ask your health care provider for ways to help you quit.  Ask family members to quit too.  Ask your health care provider for a referral to Quit Plan to help you quit smoking, or call 0-561-253-PLAN.     Colds, Flu, Bronchitis  These are common triggers of asthma. Wash your hands often.  Don t touch your eyes, nose or mouth.  Get a flu shot every year.     Dust Mites  These are tiny bugs that live in cloth or carpet. They are too small to see. Wash sheets and blankets in hot water every week.   Encase pillows and mattress in dust mite proof covers.  Avoid having carpet if you can. If you have carpet, vacuum weekly.   Use a dust mask and HEPA vacuum.   Pollen and Outdoor Mold  Some people are allergic to trees, grass, or weed pollen, or molds. Try to keep your windows closed.  Limit time out doors when pollen count is high.   Ask you health care provider about taking medicine during allergy season.     Animal Dander  Some people are allergic to skin flakes, urine or saliva from pets with fur or  feathers. Keep pets with fur or feathers out of your home.    If you can t keep the pet outdoors, then keep the pet out of your bedroom.  Keep the bedroom door closed.  Keep pets off cloth furniture and away from stuffed toys.     Mice, Rats, and Cockroaches  Some people are allergic to the waste from these pests.   Cover food and garbage.  Clean up spills and food crumbs.  Store grease in the refrigerator.   Keep food out of the bedroom.   Indoor Mold  This can be a trigger if your home has high moisture. Fix leaking faucets, pipes, or other sources of water.   Clean moldy surfaces.  Dehumidify basement if it is damp and smelly.   Smoke, Strong Odors, and Sprays  These can reduce air quality. Stay away from strong odors and sprays, such as perfume, powder, hair spray, paints, smoke incense, paint, cleaning products, candles and new carpet.   Exercise or Sports  Some people with asthma have this trigger. Be active!  Ask your doctor about taking medicine before sports or exercise to prevent symptoms.    Warm up for 5-10 minutes before and after sports or exercise.     Other Triggers of Asthma  Cold air:  Cover your nose and mouth with a scarf.  Sometimes laughing or crying can be a trigger.  Some medicines and food can trigger asthma.

## 2022-05-18 NOTE — PROGRESS NOTES
"  Assessment & Plan     Dysfunction of left eustachian tube    - Adult ENT  Referral    Mixed conductive and sensorineural hearing loss of left ear, unspecified hearing status on contralateral side    - Adult ENT  Referral    Acute otitis media, unspecified otitis media type    - Adult ENT  Referral    There appears to be purulent drainage in the left external ear canal.  I am unable to see the left TM due to the presence of this drainage.  I am concerned about possibility of TM perforation.  Feel that she should be evaluated more urgently by ENT specialist and new referral placed.             BMI:   Estimated body mass index is 36.83 kg/m  as calculated from the following:    Height as of 4/26/22: 1.6 m (5' 3\").    Weight as of this encounter: 94.3 kg (207 lb 14.4 oz).           No follow-ups on file.    Sarah Santiago MD  [facility] KVNG Gillis is a 56 year old who presents for the following health issues     HPI   She comes in today to follow-up on a left ear problem.  Symptoms started over a month ago.  She initially had some congestion in her left ear that she felt was due to allergies.  Her left ear then became plugged and started draining.  She was seen in clinic and was prescribed amoxicillin.  She developed significant nausea and vomiting with this medication so was then placed on cefdinir for 10 days.  She has now completed the antibiotic.  She is still having problems with her ear.  Has had decreased hearing.  Has had intermittent pain.  Still feels that there is fluid in the ear.  There is pressure in the ear.  Has had occasional sinus pressure as well.  She is using Flonase, Sudafed, montelukast and loratadine.  She has an appointment to see ENT on [address] 3.  Meds and allergies reviewed.  Review of systems otherwise negative.        Review of Systems         Objective    BP (!) 128/90 (BP Location: Right arm, Cuff Size: Adult Regular)   Pulse 77  "  Temp 98.3  F (36.8  C) (Oral)   Wt 94.3 kg (207 lb 14.4 oz)   SpO2 98%   BMI 36.83 kg/m    Body mass index is 36.83 kg/m .  Physical Exam   GENERAL: healthy, alert and no distress  EYES: Eyes grossly normal to inspection, PERRL and conjunctivae and sclerae normal  HENT: normal cephalic/atraumatic, right ear: normal: no effusions, no erythema, normal landmarks, left ear: purulent drainage in canal, nasal mucosa edematous , oropharynx clear and oral mucous membranes moist  NECK: no adenopathy  RESP: lungs clear to auscultation - no rales, rhonchi or wheezes  CV: regular rate and rhythm, normal S1 S2, no S3 or S4, no murmur, click or rub, no peripheral edema and peripheral pulses strong    ACT: 25

## 2022-05-19 ENCOUNTER — OFFICE VISIT (OUTPATIENT)
Dept: AUDIOLOGY | Facility: CLINIC | Age: 57
End: 2022-05-19
Payer: COMMERCIAL

## 2022-05-19 DIAGNOSIS — H92.12 OTORRHEA OF LEFT EAR: Primary | ICD-10-CM

## 2022-05-19 DIAGNOSIS — H93.8X2 EAR PRESSURE, LEFT: ICD-10-CM

## 2022-05-19 PROCEDURE — 92550 TYMPANOMETRY & REFLEX THRESH: CPT | Performed by: AUDIOLOGIST

## 2022-05-19 PROCEDURE — 92557 COMPREHENSIVE HEARING TEST: CPT | Performed by: AUDIOLOGIST

## 2022-05-19 NOTE — PROGRESS NOTES
AUDIOLOGY REPORT     SUMMARY: Audiology visit completed. See audiogram for results.       RECOMMENDATIONS: Follow-up with ENT.     John Pavon CCC-A  Licensed Audiologist   [place] #[id number]

## 2022-05-20 ENCOUNTER — OFFICE VISIT (OUTPATIENT)
Dept: OTOLARYNGOLOGY | Facility: CLINIC | Age: 57
End: 2022-05-20
Payer: COMMERCIAL

## 2022-05-20 DIAGNOSIS — H73.22 MYRINGITIS OF LEFT EAR: ICD-10-CM

## 2022-05-20 DIAGNOSIS — H90.72 MIXED CONDUCTIVE AND SENSORINEURAL HEARING LOSS OF LEFT EAR, UNSPECIFIED HEARING STATUS ON CONTRALATERAL SIDE: ICD-10-CM

## 2022-05-20 DIAGNOSIS — H69.92 DYSFUNCTION OF LEFT EUSTACHIAN TUBE: ICD-10-CM

## 2022-05-20 DIAGNOSIS — H66.90 ACUTE OTITIS MEDIA, UNSPECIFIED OTITIS MEDIA TYPE: ICD-10-CM

## 2022-05-20 DIAGNOSIS — J30.1 SEASONAL ALLERGIC RHINITIS DUE TO POLLEN: Primary | ICD-10-CM

## 2022-05-20 PROCEDURE — 99203 OFFICE O/P NEW LOW 30 MIN: CPT | Performed by: OTOLARYNGOLOGY

## 2022-05-20 RX ORDER — AZELASTINE 1 MG/ML
SPRAY, METERED NASAL
Qty: 30 ML | Refills: 11 | Status: SHIPPED | OUTPATIENT
Start: 2022-05-20 | End: 2022-07-06

## 2022-05-20 NOTE — PROGRESS NOTES
CHIEF COMPLAINT:   Diagnoses       Codes Comments    Dysfunction of left eustachian tube     H69.82     Mixed conductive and sensorineural hearing loss of left ear, unspecified hearing status on contralateral side     H90.72     Acute otitis media, unspecified otitis media type     H66.90              HISTORY OF PRESENT ILLNESS    Elis was seen at the [facility] for       Referral note:      She comes in today to follow-up on a left ear problem.  Symptoms started over a month ago.  She initially had some congestion in her left ear that she felt was due to allergies.  Her left ear then became plugged and started draining.  She was seen in clinic and was prescribed amoxicillin.  She developed significant nausea and vomiting with this medication so was then placed on cefdinir for 10 days.  She has now completed the antibiotic.  She is still having problems with her ear.  Has had decreased hearing.  Has had intermittent pain.  Still feels that there is fluid in the ear.  There is pressure in the ear.  Has had occasional sinus pressure as well.  She is using Flonase, Sudafed, montelukast and loratadine.  She has an appointment to see ENT on Kellie 3.  Meds and allergies reviewed.  Review of systems otherwise negative       REVIEW OF SYSTEMS    Review of Systems as per HPI and PMHx, otherwise 10 system review system are negative.     Sulfa (sulfonamide antibiotics) [sulfa drugs] and Sulfamethoxazole-trimethoprim [sulfamethoxazole w/trimethoprim]     There were no vitals taken for this visit.    HEAD: Normal appearance and symmetry:  No cutaneous lesions.      NECK:  supple     EARS:     Right: normal  Left:  TM thickened with cheesy exudate (debrided);  GV applied    EYES:  EOMI    CN VII/XII:  intact     NOSE:     Dorsum:   straight  Septum:  midline  Mucosa:  moist        ORAL CAVITY/OROPHARYNX:     Lips:  Normal.  Tongue: normal, midline  Mucosa:   no lesions     NECK:  Trachea:  midline.              Thyroid:   normal              Adenopathy:  none        NEURO:   Alert and Oriented     GAIT AND STATION:  normal     RESPIRATORY:   Symmetry and Respiratory effort     PSYCH:  Normal mood and affect     SKIN:   warm and dry         IMPRESSION:    Encounter Diagnoses   Name Primary?     Dysfunction of left eustachian tube      Mixed conductive and sensorineural hearing loss of left ear, unspecified hearing status on contralateral side      Acute otitis media, unspecified otitis media type      Seasonal allergic rhinitis due to pollen Yes          RECOMMENDATIONS:      Water precautions  Return visit 1 month with audiogram

## 2022-05-20 NOTE — LETTER
5/20/2022         RE: Jocelyn Quiroga  [address] [facility] [address]        Dear Colleague,    Thank you for referring your patient, Jocelyn Quiroga, to the [facility]. Please see a copy of my visit note below.    CHIEF COMPLAINT:   Diagnoses       Codes Comments    Dysfunction of left eustachian tube     H69.82     Mixed conductive and sensorineural hearing loss of left ear, unspecified hearing status on contralateral side     H90.72     Acute otitis media, unspecified otitis media type     H66.90              HISTORY OF PRESENT ILLNESS    Elis was seen at the [facility] for       Referral note:      She comes in today to follow-up on a left ear problem.  Symptoms started over a month ago.  She initially had some congestion in her left ear that she felt was due to allergies.  Her left ear then became plugged and started draining.  She was seen in clinic and was prescribed amoxicillin.  She developed significant nausea and vomiting with this medication so was then placed on cefdinir for 10 days.  She has now completed the antibiotic.  She is still having problems with her ear.  Has had decreased hearing.  Has had intermittent pain.  Still feels that there is fluid in the ear.  There is pressure in the ear.  Has had occasional sinus pressure as well.  She is using Flonase, Sudafed, montelukast and loratadine.  She has an appointment to see ENT on Kellie 3.  Meds and allergies reviewed.  Review of systems otherwise negative       REVIEW OF SYSTEMS    Review of Systems as per HPI and PMHx, otherwise 10 system review system are negative.     Sulfa (sulfonamide antibiotics) [sulfa drugs] and Sulfamethoxazole-trimethoprim [sulfamethoxazole w/trimethoprim]     There were no vitals taken for this visit.    HEAD: Normal appearance and symmetry:  No cutaneous lesions.      NECK:  supple     EARS:     Right: normal  Left:  TM thickened with cheesy exudate (debrided);  GV  applied    EYES:  EOMI    CN VII/XII:  intact     NOSE:     Dorsum:   straight  Septum:  midline  Mucosa:  moist        ORAL CAVITY/OROPHARYNX:     Lips:  Normal.  Tongue: normal, midline  Mucosa:   no lesions     NECK:  Trachea:  midline.              Thyroid:  normal              Adenopathy:  none        NEURO:   Alert and Oriented     GAIT AND STATION:  normal     RESPIRATORY:   Symmetry and Respiratory effort     PSYCH:  Normal mood and affect     SKIN:   warm and dry         IMPRESSION:    Encounter Diagnoses   Name Primary?     Dysfunction of left eustachian tube      Mixed conductive and sensorineural hearing loss of left ear, unspecified hearing status on contralateral side      Acute otitis media, unspecified otitis media type      Seasonal allergic rhinitis due to pollen Yes          RECOMMENDATIONS:      Water precautions  Return visit 1 month with audiogram      Again, thank you for allowing me to participate in the care of your patient.        Sincerely,        Guillaume German MD

## 2022-06-24 ENCOUNTER — OFFICE VISIT (OUTPATIENT)
Dept: OTOLARYNGOLOGY | Facility: CLINIC | Age: 57
End: 2022-06-24
Payer: COMMERCIAL

## 2022-06-24 ENCOUNTER — OFFICE VISIT (OUTPATIENT)
Dept: AUDIOLOGY | Facility: CLINIC | Age: 57
End: 2022-06-24

## 2022-06-24 DIAGNOSIS — L29.9 EAR ITCH: Primary | ICD-10-CM

## 2022-06-24 DIAGNOSIS — H92.12 OTORRHEA OF LEFT EAR: Primary | ICD-10-CM

## 2022-06-24 DIAGNOSIS — H61.22 CERUMEN DEBRIS ON TYMPANIC MEMBRANE OF LEFT EAR: ICD-10-CM

## 2022-06-24 PROCEDURE — 99213 OFFICE O/P EST LOW 20 MIN: CPT | Performed by: OTOLARYNGOLOGY

## 2022-06-24 PROCEDURE — V5299 HEARING SERVICE: HCPCS | Performed by: AUDIOLOGIST

## 2022-06-24 RX ORDER — FLUOCINOLONE ACETONIDE 0.11 MG/ML
OIL AURICULAR (OTIC)
Qty: 17 ML | Refills: 3 | Status: SHIPPED | OUTPATIENT
Start: 2022-06-24

## 2022-06-24 NOTE — PROGRESS NOTES
AUDIOLOGY REPORT    SUMMARY: Audiology visit completed. Patient reports that her left ear is feeling better, but it feels somewhat plugged and itches.     Otoscopy revealed clear ear canal at the right, violet stained otorrhea in the left canal. Patient released to ENT for examination.      RECOMMENDATIONS: Follow-up with ENT. Return as recommended for medical management.    John Vivar, CCC-A  Clinical Audiologist  [place] #[id number]

## 2022-06-24 NOTE — LETTER
2022         RE: Jocelyn Quiroga  [address] [facility] [address]        Dear Colleague,    Thank you for referring your patient, Jocelyn Quiroga, to the [facility]. Please see a copy of my visit note below.    CHIEF COMPLAINT:  Recheck      HISTORY OF PRESENT ILLNESS    Elis was seen in follow up for audiogram review.  Audio note peformed today due to presence of GV.  No pain or drainage.  Ear itches terribly.     Dysfunction of left eustachian tube        Mixed conductive and sensorineural hearing loss of left ear, unspecified hearing status on contralateral side       Acute otitis media, unspecified otitis media type       Seasonal allergic rhinitis due to pollen Yes            RECOMMENDATIONS:        Water precautions  Return visit 1       REVIEW OF SYSTEMS    Review of Systems: a 10-system review is reviewed at this encounter.  See scanned document.     Sulfa (sulfonamide antibiotics) [sulfa drugs] and Sulfamethoxazole-trimethoprim [sulfamethoxazole w/trimethoprim]     PHYSICAL EXAM:        HEAD: Normal appearance and symmetry:  No cutaneous lesions.      EAR DEBRIDEMENT    Using a #5 suction, some squamous debris is removed from the surface of the left TM.  EAC skin is C/D/I.          NOSE:    Dorsum:   straight       ORAL CAVITY/OROPHARYNX:    Lips:  Normal.     NECK:  Trachea:  midline       NEURO:   Alert and Oriented    GAIT AND STATION:  normal     RESPIRATORY:   Symmetry and Respiratory effort    PSYCH:   normal mood and affect    SKIN:  warm and dry         IMPRESSION:   Encounter Diagnoses   Name Primary?     Ear itch Yes     Cerumen debris on tympanic membrane of left ear               RECOMMENDATIONS:    No orders of the defined types were placed in this encounter.     Orders Placed This Encounter   Medications     fluocinolone acetonide 0.01 % OIL     Si-3 drops to affected ear every other night for itching     Dispense:  17 mL     Refill:  3             Again, thank you for allowing me to participate in the care of your patient.        Sincerely,        Guillaume German MD

## 2022-06-24 NOTE — PROGRESS NOTES
CHIEF COMPLAINT:  Recheck      HISTORY OF PRESENT ILLNESS    Elis was seen in follow up for audiogram review.  Audio note peformed today due to presence of GV.  No pain or drainage.  Ear itches terribly.     Dysfunction of left eustachian tube        Mixed conductive and sensorineural hearing loss of left ear, unspecified hearing status on contralateral side       Acute otitis media, unspecified otitis media type       Seasonal allergic rhinitis due to pollen Yes            RECOMMENDATIONS:        Water precautions  Return visit 1       REVIEW OF SYSTEMS    Review of Systems: a 10-system review is reviewed at this encounter.  See scanned document.     Sulfa (sulfonamide antibiotics) [sulfa drugs] and Sulfamethoxazole-trimethoprim [sulfamethoxazole w/trimethoprim]     PHYSICAL EXAM:        HEAD: Normal appearance and symmetry:  No cutaneous lesions.      EAR DEBRIDEMENT    Using a #5 suction, some squamous debris is removed from the surface of the left TM.  EAC skin is C/D/I.          NOSE:    Dorsum:   straight       ORAL CAVITY/OROPHARYNX:    Lips:  Normal.     NECK:  Trachea:  midline       NEURO:   Alert and Oriented    GAIT AND STATION:  normal     RESPIRATORY:   Symmetry and Respiratory effort    PSYCH:   normal mood and affect    SKIN:  warm and dry         IMPRESSION:   Encounter Diagnoses   Name Primary?     Ear itch Yes     Cerumen debris on tympanic membrane of left ear               RECOMMENDATIONS:    No orders of the defined types were placed in this encounter.     Orders Placed This Encounter   Medications     fluocinolone acetonide 0.01 % OIL     Si-3 drops to affected ear every other night for itching     Dispense:  17 mL     Refill:  3

## 2022-07-01 ENCOUNTER — ANCILLARY PROCEDURE (OUTPATIENT)
Dept: MAMMOGRAPHY | Facility: CLINIC | Age: 57
End: 2022-07-01
Attending: FAMILY MEDICINE
Payer: COMMERCIAL

## 2022-07-01 DIAGNOSIS — Z12.31 VISIT FOR SCREENING MAMMOGRAM: ICD-10-CM

## 2022-07-01 PROCEDURE — 77067 SCR MAMMO BI INCL CAD: CPT

## 2022-07-06 ENCOUNTER — OFFICE VISIT (OUTPATIENT)
Dept: FAMILY MEDICINE | Facility: CLINIC | Age: 57
End: 2022-07-06
Payer: COMMERCIAL

## 2022-07-06 VITALS
TEMPERATURE: 98.2 F | DIASTOLIC BLOOD PRESSURE: 84 MMHG | WEIGHT: 208.4 LBS | HEIGHT: 62 IN | BODY MASS INDEX: 38.35 KG/M2 | OXYGEN SATURATION: 98 % | HEART RATE: 86 BPM | SYSTOLIC BLOOD PRESSURE: 120 MMHG

## 2022-07-06 DIAGNOSIS — Z12.83 SKIN CANCER SCREENING: ICD-10-CM

## 2022-07-06 DIAGNOSIS — Z11.4 SCREENING FOR HIV (HUMAN IMMUNODEFICIENCY VIRUS): ICD-10-CM

## 2022-07-06 DIAGNOSIS — J45.20 MILD INTERMITTENT ASTHMA WITHOUT COMPLICATION: ICD-10-CM

## 2022-07-06 DIAGNOSIS — Z11.59 NEED FOR HEPATITIS C SCREENING TEST: ICD-10-CM

## 2022-07-06 DIAGNOSIS — Z00.00 ROUTINE HISTORY AND PHYSICAL EXAMINATION OF ADULT: Primary | ICD-10-CM

## 2022-07-06 DIAGNOSIS — D48.5 NEOPLASM OF UNCERTAIN BEHAVIOR OF SKIN: ICD-10-CM

## 2022-07-06 DIAGNOSIS — E66.01 MORBID OBESITY (H): ICD-10-CM

## 2022-07-06 DIAGNOSIS — J30.9 ALLERGIC RHINITIS, UNSPECIFIED SEASONALITY, UNSPECIFIED TRIGGER: ICD-10-CM

## 2022-07-06 DIAGNOSIS — Z12.4 CERVICAL CANCER SCREENING: ICD-10-CM

## 2022-07-06 DIAGNOSIS — G89.29 CHRONIC RIGHT SHOULDER PAIN: ICD-10-CM

## 2022-07-06 DIAGNOSIS — M25.511 CHRONIC RIGHT SHOULDER PAIN: ICD-10-CM

## 2022-07-06 LAB
ALBUMIN SERPL BCG-MCNC: 4.5 G/DL (ref 3.5–5.2)
ALP SERPL-CCNC: 92 U/L (ref 35–104)
ALT SERPL W P-5'-P-CCNC: 56 U/L (ref 10–35)
ANION GAP SERPL CALCULATED.3IONS-SCNC: 17 MMOL/L (ref 7–15)
AST SERPL W P-5'-P-CCNC: 32 U/L (ref 10–35)
BILIRUB SERPL-MCNC: 0.3 MG/DL
BUN SERPL-MCNC: 11.1 MG/DL (ref 6–20)
CALCIUM SERPL-MCNC: 9.5 MG/DL (ref 8.6–10)
CHLORIDE SERPL-SCNC: 98 MMOL/L (ref 98–107)
CHOLEST SERPL-MCNC: 257 MG/DL
CREAT SERPL-MCNC: 0.72 MG/DL (ref 0.51–0.95)
DEPRECATED HCO3 PLAS-SCNC: 24 MMOL/L (ref 22–29)
GFR SERPL CREATININE-BSD FRML MDRD: >90 ML/MIN/1.73M2
GLUCOSE SERPL-MCNC: 109 MG/DL (ref 70–99)
HDLC SERPL-MCNC: 59 MG/DL
HGB BLD-MCNC: 13.8 G/DL (ref 11.7–15.7)
LDLC SERPL CALC-MCNC: 167 MG/DL
NONHDLC SERPL-MCNC: 198 MG/DL
POTASSIUM SERPL-SCNC: 4.5 MMOL/L (ref 3.4–5.3)
PROT SERPL-MCNC: 6.9 G/DL (ref 6.4–8.3)
SODIUM SERPL-SCNC: 139 MMOL/L (ref 136–145)
TRIGL SERPL-MCNC: 153 MG/DL
TSH SERPL DL<=0.005 MIU/L-ACNC: 3.75 UIU/ML (ref 0.3–4.2)

## 2022-07-06 PROCEDURE — 87624 HPV HI-RISK TYP POOLED RSLT: CPT | Performed by: FAMILY MEDICINE

## 2022-07-06 PROCEDURE — 84443 ASSAY THYROID STIM HORMONE: CPT | Performed by: FAMILY MEDICINE

## 2022-07-06 PROCEDURE — 80061 LIPID PANEL: CPT | Performed by: FAMILY MEDICINE

## 2022-07-06 PROCEDURE — 87389 HIV-1 AG W/HIV-1&-2 AB AG IA: CPT | Performed by: FAMILY MEDICINE

## 2022-07-06 PROCEDURE — 86803 HEPATITIS C AB TEST: CPT | Performed by: FAMILY MEDICINE

## 2022-07-06 PROCEDURE — G0145 SCR C/V CYTO,THINLAYER,RESCR: HCPCS | Performed by: FAMILY MEDICINE

## 2022-07-06 PROCEDURE — 90750 HZV VACC RECOMBINANT IM: CPT | Performed by: FAMILY MEDICINE

## 2022-07-06 PROCEDURE — 90471 IMMUNIZATION ADMIN: CPT | Performed by: FAMILY MEDICINE

## 2022-07-06 PROCEDURE — 85018 HEMOGLOBIN: CPT | Performed by: FAMILY MEDICINE

## 2022-07-06 PROCEDURE — 99396 PREV VISIT EST AGE 40-64: CPT | Mod: 25 | Performed by: FAMILY MEDICINE

## 2022-07-06 PROCEDURE — 99214 OFFICE O/P EST MOD 30 MIN: CPT | Mod: 25 | Performed by: FAMILY MEDICINE

## 2022-07-06 PROCEDURE — 80053 COMPREHEN METABOLIC PANEL: CPT | Performed by: FAMILY MEDICINE

## 2022-07-06 PROCEDURE — 36415 COLL VENOUS BLD VENIPUNCTURE: CPT | Performed by: FAMILY MEDICINE

## 2022-07-06 ASSESSMENT — ENCOUNTER SYMPTOMS
PALPITATIONS: 0
DIZZINESS: 0
COUGH: 0
HEARTBURN: 0
NAUSEA: 0
PARESTHESIAS: 0
CHILLS: 0
WEAKNESS: 0
HEADACHES: 0
NERVOUS/ANXIOUS: 0
HEMATURIA: 0
FREQUENCY: 0
EYE PAIN: 0
HEMATOCHEZIA: 0
BREAST MASS: 0
DYSURIA: 0
ABDOMINAL PAIN: 0
SHORTNESS OF BREATH: 0
CONSTIPATION: 0
SORE THROAT: 0
FEVER: 0
MYALGIAS: 0
DIARRHEA: 1
ARTHRALGIAS: 1
JOINT SWELLING: 0

## 2022-07-06 NOTE — PROGRESS NOTES
SUBJECTIVE:   CC: Jocelyn Quiroga is an 56 year old woman who presents for preventive health visit.   Reviewed her health history including medications, allergies and social history.  Chart is updated.  She has history of allergic rhinitis and mild intermittent asthma.  Uses albuterol inhaler rarely.  Typically only when she has an upper respiratory infection.  Does not need refill at this time.    Has been seeing an ENT specialist recently for a fungal infection in her left ear.  That is doing much better.  We will follow-up again in 6 months with ENT specialist.    Was seen in December for bilateral shoulder pain.  Referred to physical therapy.  Shoulder pain and left shoulder improved with PT.  Still bothered by pain in right shoulder.  Overall has improved since December.  Notices discomfort with reaching behind her back to put on her bra or when raising her arm above the level of her shoulder.  Not interested in cortisone injection.  Wonders if another round of physical therapy would be helpful.  Does not need to take pain medication.    She has a couple of spots on her skin that she would like looked at.  Interested in seeing dermatologist for skin cancer screen.  Has a spot on her right thigh and her right foot that have been present for several months.    She has regular vision and dental exams.  She is due for Pap smear.  Up-to-date on mammogram and colonoscopy.    Continues to struggle with her weight.  Planning on partnering with her  on dietary changes.  Has plans to start working with a .  Interested in prescription medications to help with weight loss    Review of systems is assessed and is otherwise negative.  No other concerns or questions.    Patient has been advised of split billing requirements and indicates understanding: Yes  Healthy Habits:     Getting at least 3 servings of Calcium per day:  Yes    Bi-annual eye exam:  NO    Dental care twice a year:  Yes    Sleep  apnea or symptoms of sleep apnea:  None    Diet:  Regular (no restrictions)    Frequency of exercise:  1 day/week    Duration of exercise:  Less than 15 minutes    Taking medications regularly:  Yes    Medication side effects:  None    PHQ-2 Total Score: 0    Additional concerns today:  Yes              Today's PHQ-2 Score:   PHQ-2 ( 1999 Pfizer) 7/6/2022   Q1: Little interest or pleasure in doing things 0   Q2: Feeling down, depressed or hopeless 0   PHQ-2 Score 0   Q1: Little interest or pleasure in doing things Not at all   Q2: Feeling down, depressed or hopeless Not at all   PHQ-2 Score 0       Abuse: Current or Past (Physical, Sexual or Emotional) - No  Do you feel safe in your environment? Yes    Have you ever done Advance Care Planning? (For example, a Health Directive, POLST, or a discussion with a medical provider or your loved ones about your wishes): Yes, patient states has an Advance Care Planning document and will bring a copy to the clinic.    Social History     Tobacco Use     Smoking status: Never Smoker     Smokeless tobacco: Never Used   Substance Use Topics     Alcohol use: Yes     Alcohol/week: 4.0 standard drinks     Comment: Alcoholic Drinks/day: 4-5 times week         Alcohol Use 7/6/2022   Prescreen: >3 drinks/day or >7 drinks/week? No       Reviewed orders with patient.  Reviewed health maintenance and updated orders accordingly - Yes  Current Outpatient Medications   Medication Sig Dispense Refill     acetaminophen (TYLENOL) 500 MG tablet Take 500-1,000 mg by mouth every 6 hours as needed for mild pain       albuterol (PROAIR HFA;PROVENTIL HFA;VENTOLIN HFA) 90 mcg/actuation inhaler [ALBUTEROL (PROAIR HFA;PROVENTIL HFA;VENTOLIN HFA) 90 MCG/ACTUATION INHALER] TAKE 2 PUFFS BY MOUTH EVERY 4 HOURS AS NEEDED 8.5 g 5     albuterol (PROVENTIL) 2.5 mg /3 mL (0.083 %) nebulizer solution [ALBUTEROL (PROVENTIL) 2.5 MG /3 ML (0.083 %) NEBULIZER SOLUTION] Take 3 mL (2.5 mg total) by nebulization every 6  (six) hours as needed for wheezing. 60 vial 1     cholecalciferol, vitamin D3, (VITAMIN D3) 2,000 unit cap [CHOLECALCIFEROL, VITAMIN D3, (VITAMIN D3) 2,000 UNIT CAP] Take by mouth.       fluocinolone acetonide 0.01 % OIL 2-3 drops to affected ear every other night for itching 17 mL 3     ibuprofen (ADVIL/MOTRIN) 200 MG tablet Take 400 mg by mouth every 4 hours as needed for mild pain       loratadine (CLARITIN) 10 mg tablet [LORATADINE (CLARITIN) 10 MG TABLET] Take by mouth.       montelukast (SINGULAIR) 10 MG tablet Take 1 tablet (10 mg) by mouth daily 90 tablet 3     olopatadine (PATANOL) 0.1 % ophthalmic solution [OLOPATADINE (PATANOL) 0.1 % OPHTHALMIC SOLUTION] APPLY ONE DROP TO EYE TWICE DAILY 5 mL 1       Breast Cancer Screening:    FHS-7:   Breast CA Risk Assessment (FHS-7) 7/1/2022 7/6/2022   Did any of your first-degree relatives have breast or ovarian cancer? No No   Did any of your relatives have bilateral breast cancer? No No   Did any man in your family have breast cancer? No No   Did any woman in your family have breast and ovarian cancer? No No   Did any woman in your family have breast cancer before age 50 y? No No   Do you have 2 or more relatives with breast and/or ovarian cancer? No No   Do you have 2 or more relatives with breast and/or bowel cancer? No No         Pertinent mammograms are reviewed under the imaging tab.    History of abnormal Pap smear: NO - age 30-65 PAP every 5 years with negative HPV co-testing recommended  PAP / HPV 8/5/2015   PAP Negative for squamous intraepithelial lesion or malignancy  Electronically signed by Isaura Millan CT (ASCP) on 8/14/2015 at 11:33 AM       Reviewed and updated as needed this visit by clinical staff   Tobacco  Allergies  Meds                Reviewed and updated as needed this visit by Provider     Meds                   Review of Systems   Constitutional: Negative for chills and fever.   HENT: Negative for congestion, ear pain, hearing  "loss and sore throat.    Eyes: Negative for pain and visual disturbance.   Respiratory: Negative for cough and shortness of breath.    Cardiovascular: Negative for chest pain, palpitations and peripheral edema.   Gastrointestinal: Positive for diarrhea. Negative for abdominal pain, constipation, heartburn, hematochezia and nausea.   Breasts:  Negative for tenderness, breast mass and discharge.   Genitourinary: Negative for dysuria, frequency, genital sores, hematuria, pelvic pain, urgency, vaginal bleeding and vaginal discharge.   Musculoskeletal: Positive for arthralgias. Negative for joint swelling and myalgias.   Skin: Negative for rash.   Neurological: Negative for dizziness, weakness, headaches and paresthesias.   Psychiatric/Behavioral: Negative for mood changes. The patient is not nervous/anxious.           OBJECTIVE:   /84 (BP Location: Left arm, Cuff Size: Adult Large)   Pulse 86   Temp 98.2  F (36.8  C) (Oral)   Ht 1.575 m (5' 2\")   Wt 94.5 kg (208 lb 6.4 oz)   SpO2 98%   BMI 38.12 kg/m    Physical Exam  GENERAL APPEARANCE: healthy, alert and no distress  EYES: Eyes grossly normal to inspection, PERRL and conjunctivae and sclerae normal  HENT: ear canals and TM's normal  RESP: lungs clear to auscultation - no rales, rhonchi or wheezes  BREAST: normal without masses, tenderness or nipple discharge and no palpable axillary masses or adenopathy  CV: regular rate and rhythm, normal S1 S2, no S3 or S4, no murmur, click or rub, no peripheral edema and peripheral pulses strong  ABDOMEN: soft, nontender, no hepatosplenomegaly, no masses and bowel sounds normal   (female): normal female external genitalia, normal urethral meatus, vaginal mucosal atrophy noted, normal cervix, adnexae, and uterus without masses or abnormal discharge  MS: no musculoskeletal defects are noted and gait is age appropriate without ataxia  SKIN: no suspicious lesions or rashes  NEURO: Normal strength and tone, sensory exam " grossly normal, mentation intact and speech normal  PSYCH: mentation appears normal and affect normal/bright        ASSESSMENT/PLAN:   Jocelyn was seen today for physical and shoulder pain.    Diagnoses and all orders for this visit:    Routine history and physical examination of adult  -     Lipid panel reflex to direct LDL Fasting; Future  -     Hemoglobin; Future  -     Lipid panel reflex to direct LDL Fasting  -     Hemoglobin  -Check fasting labs today.  Vaccines up-to-date.  She will delay COVID booster at this time.  Continue with regular vision and dental exams.  Encouraged regular exercise, healthy diet and weight loss efforts.  Pap smear performed.  Up-to-date on mammogram and colonoscopy.    Screening for HIV (human immunodeficiency virus)  -     HIV Antigen Antibody Combo; Future  -     HIV Antigen Antibody Combo    Need for hepatitis C screening test  -     Hepatitis C Screen Reflex to HCV RNA Quant and Genotype; Future  -     Hepatitis C Screen Reflex to HCV RNA Quant and Genotype    Cervical cancer screening  -     Pap Screen with HPV - recommended age 30 - 65 years    Morbid obesity (H)  -     Comprehensive Weight Management; Future  -     Lipid panel reflex to direct LDL Fasting; Future  -     Comprehensive metabolic panel (BMP + Alb, Alk Phos, ALT, AST, Total. Bili, TP); Future  -     TSH; Future  -     Lipid panel reflex to direct LDL Fasting  -     Comprehensive metabolic panel (BMP + Alb, Alk Phos, ALT, AST, Total. Bili, TP)  -     TSH  - Encouraged regular exercise, healthy diet.  Referral placed to weight management clinic    Chronic right shoulder pain  -     Physical Therapy Referral; Future  - We will try another course of physical therapy.  If not improving, recommend further evaluation with MRI    Neoplasm of uncertain behavior of skin  -     Adult Dermatology Referral; Future    Skin cancer screening  -     Adult Dermatology Referral; Future    Mild intermittent asthma without  "complication  Well-controlled.  Continue albuterol inhaler as needed    Allergic rhinitis, unspecified seasonality, unspecified trigger  Continue current medications    Other orders  -     ZOSTER VACCINE RECOMBINANT ADJUVANTED (SHINGRIX)  -     PRIMARY CARE FOLLOW-UP SCHEDULING; Future        Patient has been advised of split billing requirements and indicates understanding: Yes    COUNSELING:  Reviewed preventive health counseling, as reflected in patient instructions    Estimated body mass index is 38.12 kg/m  as calculated from the following:    Height as of this encounter: 1.575 m (5' 2\").    Weight as of this encounter: 94.5 kg (208 lb 6.4 oz).    Weight management plan: Patient referred to endocrine and/or weight management specialty Discussed healthy diet and exercise guidelines    She reports that she has never smoked. She has never used smokeless tobacco.      Counseling Resources:  ATP IV Guidelines  Pooled Cohorts Equation Calculator  Breast Cancer Risk Calculator  BRCA-Related Cancer Risk Assessment: FHS-7 Tool  FRAX Risk Assessment  ICSI Preventive Guidelines  Dietary Guidelines for Americans, 2010  USDA's MyPlate  ASA Prophylaxis  Lung CA Screening    Sarah Santiago MD  [facility]  "

## 2022-07-07 LAB
HCV AB SERPL QL IA: NONREACTIVE
HIV 1+2 AB+HIV1 P24 AG SERPL QL IA: NONREACTIVE

## 2022-07-12 LAB
BKR LAB AP GYN ADEQUACY: NORMAL
BKR LAB AP GYN INTERPRETATION: NORMAL
BKR LAB AP HPV REFLEX: NORMAL
BKR LAB AP PREVIOUS ABNORMAL: NORMAL
PATH REPORT.COMMENTS IMP SPEC: NORMAL
PATH REPORT.COMMENTS IMP SPEC: NORMAL
PATH REPORT.RELEVANT HX SPEC: NORMAL

## 2022-07-13 LAB
HUMAN PAPILLOMA VIRUS 16 DNA: NEGATIVE
HUMAN PAPILLOMA VIRUS 18 DNA: NEGATIVE
HUMAN PAPILLOMA VIRUS FINAL DIAGNOSIS: NORMAL
HUMAN PAPILLOMA VIRUS OTHER HR: NEGATIVE

## 2022-07-14 ENCOUNTER — HOSPITAL ENCOUNTER (OUTPATIENT)
Dept: PHYSICAL THERAPY | Facility: REHABILITATION | Age: 57
Discharge: HOME | End: 2022-07-14
Attending: FAMILY MEDICINE
Payer: COMMERCIAL

## 2022-07-14 DIAGNOSIS — G89.29 CHRONIC LEFT SHOULDER PAIN: ICD-10-CM

## 2022-07-14 DIAGNOSIS — G89.29 CHRONIC RIGHT SHOULDER PAIN: Primary | ICD-10-CM

## 2022-07-14 DIAGNOSIS — M25.512 CHRONIC LEFT SHOULDER PAIN: ICD-10-CM

## 2022-07-14 DIAGNOSIS — M25.511 CHRONIC RIGHT SHOULDER PAIN: Primary | ICD-10-CM

## 2022-07-14 PROCEDURE — 97110 THERAPEUTIC EXERCISES: CPT | Mod: GP | Performed by: PHYSICAL THERAPIST

## 2022-07-14 PROCEDURE — 97161 PT EVAL LOW COMPLEX 20 MIN: CPT | Mod: GP | Performed by: PHYSICAL THERAPIST

## 2022-08-05 ENCOUNTER — VIRTUAL VISIT (OUTPATIENT)
Dept: SURGERY | Facility: CLINIC | Age: 57
End: 2022-08-05

## 2022-08-05 ENCOUNTER — VIRTUAL VISIT (OUTPATIENT)
Dept: FAMILY MEDICINE | Facility: CLINIC | Age: 57
End: 2022-08-05
Attending: FAMILY MEDICINE
Payer: COMMERCIAL

## 2022-08-05 DIAGNOSIS — E88.810 METABOLIC SYNDROME X: ICD-10-CM

## 2022-08-05 DIAGNOSIS — K76.0 NAFLD (NONALCOHOLIC FATTY LIVER DISEASE): ICD-10-CM

## 2022-08-05 DIAGNOSIS — E66.812 CLASS 2 SEVERE OBESITY DUE TO EXCESS CALORIES WITH SERIOUS COMORBIDITY AND BODY MASS INDEX (BMI) OF 38.0 TO 38.9 IN ADULT (H): Primary | ICD-10-CM

## 2022-08-05 DIAGNOSIS — E66.812 CLASS 2 SEVERE OBESITY DUE TO EXCESS CALORIES WITH SERIOUS COMORBIDITY AND BODY MASS INDEX (BMI) OF 38.0 TO 38.9 IN ADULT (H): ICD-10-CM

## 2022-08-05 DIAGNOSIS — Z71.3 NUTRITIONAL COUNSELING: ICD-10-CM

## 2022-08-05 DIAGNOSIS — K21.9 GERD (GASTROESOPHAGEAL REFLUX DISEASE): Primary | ICD-10-CM

## 2022-08-05 DIAGNOSIS — E66.01 CLASS 2 SEVERE OBESITY DUE TO EXCESS CALORIES WITH SERIOUS COMORBIDITY AND BODY MASS INDEX (BMI) OF 38.0 TO 38.9 IN ADULT (H): ICD-10-CM

## 2022-08-05 DIAGNOSIS — E66.01 CLASS 2 SEVERE OBESITY DUE TO EXCESS CALORIES WITH SERIOUS COMORBIDITY AND BODY MASS INDEX (BMI) OF 38.0 TO 38.9 IN ADULT (H): Primary | ICD-10-CM

## 2022-08-05 DIAGNOSIS — E55.9 VITAMIN D DEFICIENCY: ICD-10-CM

## 2022-08-05 DIAGNOSIS — R73.03 PREDIABETES: ICD-10-CM

## 2022-08-05 PROCEDURE — 99215 OFFICE O/P EST HI 40 MIN: CPT | Mod: 95 | Performed by: FAMILY MEDICINE

## 2022-08-05 PROCEDURE — 97802 MEDICAL NUTRITION INDIV IN: CPT | Mod: 95 | Performed by: DIETITIAN, REGISTERED

## 2022-08-05 NOTE — PATIENT INSTRUCTIONS
Macronutrient Goals    Minimum 3 meals per day, control daily calories   - 2829-7730 female   - 1600 male  Minimum of 4 palm servings of protein daily.  Begin everyday with protein    - ~80 - 100 gm for female   - 120 gm for male  Be mindful of net carbs 50-75 gm/day  - (Total carbs - fiber)   - Less than 5 gm of carbs with breakfast    Supplementation   - 1 multivitamin   - clear and copious urination (adequate water consumption)   - 2000 mg fish oil capsules    - 2000 IU Vitamin D (Unless checked during clinic visit I will be checking your level today and may send a prescriptionto your pharmacy if necessary)

## 2022-08-05 NOTE — PROGRESS NOTES
"Elis is a 56 year old who is being evaluated via a billable video visit.      How would you like to obtain your AVS? MyChart  If the video visit is dropped, the invitation should be resent by: Text to cell phone: [phone]  Will anyone else be joining your video visit? No  {If patient encounters technical issues they should call [phone] :314762}      Video-Visit Details    Video Start Time: {video visit start/end time for provider to select:511777}    Type of service:  Video Visit    Video End Time:{video visit start/end time for provider to select:065659}    Originating Location (pt. Location): {video visit patient location:126156::\"Home\"}    Distant Location (provider location):  [facility]     Platform used for Video Visit: {Virtual Visit Platforms:461425::\"Order Mapper\"}    Problem List Items Addressed This Visit        Digestive    Vitamin D Deficiency    Morbid obesity (H)    NAFLD (nonalcoholic fatty liver disease)       Endocrine    Prediabetes      Other Visit Diagnoses     Class 2 severe obesity due to excess calories with serious comorbidity and body mass index (BMI) of 38.0 to 38.9 in adult (H)    -  Primary             Subjective   Elis is a 56 year old who presents for the following health issues {ACCOMPANIED BY STATEMENT (Optional):775107}  Chief Complaint   Patient presents with     Weight Loss     Initial      HPI   {SUPERLIST OPTIONAL (Optional):004511}  Review of Systems         Objective    Vitals - Patient Reported  Weight (Patient Reported): 92.2 kg (203 lb 3 oz) (on 08/01/22)  Height (Patient Reported): 157.5 cm (5' 2\")  BMI (Based on Pt Reported Ht/Wt): 37.16        Physical Exam   {SoapO Exam Unselected:822091::\"GENERAL: healthy, alert and no distress\"}  {Diagnostic Test Results (Optional):351834}      {2021 E&M time (Optional):909023}    This note has been dictated using voice recognition software. Any grammatical or context distortions are unintentional and " inherent to the software

## 2022-08-05 NOTE — LETTER
8/5/2022         RE: Jocelyn Quiroga  [address] [address]        Dear Colleague,    Thank you for referring your patient, Jocelyn Quiroga, to the [facility] [place]. Please see a copy of my visit note below.    Jocelyn Quiroga is a 56 year old who is being evaluated via a billable video visit.      How would you like to obtain your AVS? MyChart  If the video visit is dropped, the invitation should be resent by: Send to e-mail at: [email or web address]  Will anyone else be joining your video visit? No      Video Start Time: 1:43 PM      Medical Weight Loss Initial Diet Evaluation  Assessment:  Jocelyn is presenting today for a new weight management nutrition consultation. Pt has had an initial appointment with Dr. Holman.  Weight loss medication: Ozempic.     Anthropometrics:    Pt's weight is 208.4 lbs  Initial weight: 208.4 lbs  Weight change: 0  BMI: There is no height or weight on file to calculate BMI.   Ideal body weight: 50.1 kg (110 lb 7.2 oz)  Adjusted ideal body weight: 67.9 kg (149 lb 10.1 oz)    Estimated RMR ([place]-St Jeor equation):  1503 kcals x 1.3 (light active) = 1954 kcals (for weight maintenance)    Recommended Protein Intake: 60-80 grams of protein/day    Medical History:  Patient Active Problem List   Diagnosis     Vitamin D deficiency     Fatigue     Allergic rhinitis     Urticaria     Chronic Allergic Conjunctivitis     Mild intermittent asthma without complication     Allergic rhinitis due to pollen     Allergic Rhinitis Due To Animals     Joint Pain, Localized In The Left Shoulder     Joint Pain, Localized In The Knee     GERD (gastroesophageal reflux disease)     Class 2 severe obesity due to excess calories with serious comorbidity and body mass index (BMI) of 38.0 to 38.9 in adult (H)     Prediabetes     NAFLD (nonalcoholic fatty liver disease)     Metabolic syndrome X      Diabetes: No  HbA1c:   No results found for: HGBA1C    Nutrition History:   Food allergies/intolerances/cultural or religous food customs: No     Vitamins/Mineral Supplementation: Vitamin D     Dietary Recall:  Breakfast: eggs (fried or poached) with 1/2 bagel or english muffin or greek yogurt or oatmeal   Lunch: tends to struggle the most with-deli chicken with slice of cheese, occasional slice of bread/bun or pasta with homemade spaghetti sauce or 1/2-1 cup cottage cheese or leftovers   Dinner: chicken with brown rice or farrow or pasta and veggies (green beans or 1/2 bell pepper) or steak with peppers and potatoes or stir georges with genie rice or stew meat with carrots and potatoes, rice on the side or soup or chili   Typical Snacks: Not really a big snacker, occasional popcorn with butter and salt or nuts or occasional ice cream   Overnight eating: No  Eating out: 0-1 time/week     Beverages: unsweetened iced tea, coffee with half and half, water, occasional glass of wine at supper     Exercise: started this week-walking for ~ 2 miles (goal is to reach 3 miles of walking daily), increased movement throughout the day    Nutrition Diagnosis (PES statement):   Overweight/Obesity (NC 3.3) related to overeating and poor lifestyle habits as evidenced by patient report of excessive energy intake and BMI 38.2 kg/m2.     Nutrition Intervention  1. Food and/or Nutrient Delivery   a. Placed emphasis on importance of developing a healthy meal routine, aiming for 3 meals a day and no snacks.  2. Nutrition Education   a. Discussed with patient how to build a meal: the importance of including a lean/low fat protein at each meal, include a source of vegetables at a minimum of lunch and dinner and limiting carbohydrate intake to <25% per meal.  b. Educated on sources of lean protein, portion sizes, the amount of grams found in each source. Recommend patient to aim for 20-30g protein at each meal.  c. Educated on how to read a food label: keeping  total fat <10g and sugar <10g per serving.  d. Discussed the importance of adequate hydration, with emphasis on drinking 64oz of water or zero calorie beverages per day.  3. Nutrition Counseling   a. Encouraged importance of developing routine exercise for health benefits and weight loss.    Goals established by patient:   1. Start tracking intake via food journal cassandra, aiming for 8319-1033 calories/day.   2. Focus on protein first, aiming for  grams/day.   3. Work on reducing carbohydrates, aiming for 50-75 grams of net carbs.   Handouts provided:  Recipe Resources  Lean Protein Sources     Assessment/Plan:    Pt will follow up in 6 week(s) with bariatrician and 1 month(s) with dietitian.         Video-Visit Details    Type of service:  Video Visit    Video End Time:2:25 PM    Originating Location (pt. Location): Home    Distant Location (provider location):  [facility] [place]     Platform used for Video Visit: Fran Calhoun RD        Again, thank you for allowing me to participate in the care of your patient.        Sincerely,        Sandra Calhoun RD

## 2022-08-05 NOTE — ASSESSMENT & PLAN NOTE
56 year old year old female in clinic today to discuss treatment of the following conditions through diet and lifestyle modification and weight loss:  1. Class 2 severe obesity due to excess calories with serious comorbidity and body mass index (BMI) of 38.0 to 38.9 in adult (H)    2. Metabolic syndrome X    3. Prediabetes    4. Vitamin D deficiency    5. NAFLD (nonalcoholic fatty liver disease)      INTAKE: weight gain, worse since pandemic.  Strong family history of diabetes.  Meets criteria for metabolic syndrome.  Undiagnosed BRIAN?  No contraindication for GLP1 receptor agonist.  We do lengthy conversation regarding metabolic syndrome and insulin resistance.  I believe carbohydrate restriction with a focus on protein increase makes sense for this individual.  She has observed that when she eats a protein-based breakfast she can make it to lunch without feeling particularly hungry.  We will focus on this type of eating throughout the day.  I also wonder if she has an intolerance to some food ingredients (gluten, soy, corn?)  Given her allergies.  That may be contributory as well.  She will meet with our dietitian later today.  She is working to improve movement.  She will use her apple watch and phone and spouse to evaluate quality of sleep.  Consider sleep medicine referral in future.  - Trial of semaglutide.  I suspect that she will get coverage for Ozempic.  This was prescribed using a diagnosis of prediabetes and metabolic syndrome.  If not covered I would prescribe Saxenda using a diagnosis of obesity with comorbidity.  - If she has a high deductible plan which makes these medicines because prohibitive we will try for Mounjaro using  saver coupon for at least 6 to 12 months.  - Follow-up with me 6 weeks to review medication.  - Lastly, no personal/family history of thyroid cancer.  No personal history of pancreatitis.  No obvious contraindications medication.

## 2022-08-05 NOTE — PROGRESS NOTES
Type of service:  Video Visit    Jocelyn Quiroga is a 56 year old female who is being evaluated via a billable video visit.      How would you like to obtain your AVS? Marekhart  If dropped from the video visit, the video invitation should be resent by: [phone]  Will anyone else be joining your video visit? No    Video Start Time: 8:02 AM  Video End Time (time video stopped): 8:55 AM  Originating Location (pt. Location): Home  Distant Location (provider location):  [facility]   Platform used for Video Visit: [facility] Weight Management Consult    PATIENT:  Jocelyn Quiroga  MRN:         [id number]  :         1965  KO:         2022    Dear Dr. Santiago,    I had the pleasure of seeing your patient, Jocelyn Quiroga. Full intake/assessment was done to determine barriers to weight loss success and develop a treatment plan. Jocelyn Quiroga is a 56 year old female interested in treatment of medical problems associated with excess weight. She has a height of Data Unavailable, a weight of 0 lbs 0 oz, and the calculated There is no height or weight on file to calculate BMI.    ASSESSMENT/PLAN:    Class 2 severe obesity due to excess calories with serious comorbidity and body mass index (BMI) of 38.0 to 38.9 in adult (H)  56 year old year old female in clinic today to discuss treatment of the following conditions through diet and lifestyle modification and weight loss:  1. Class 2 severe obesity due to excess calories with serious comorbidity and body mass index (BMI) of 38.0 to 38.9 in adult (H)    2. Metabolic syndrome X    3. Prediabetes    4. Vitamin D deficiency    5. NAFLD (nonalcoholic fatty liver disease)      INTAKE: weight gain, worse since pandemic.  Strong family history of diabetes.  Meets criteria for metabolic syndrome.  Undiagnosed BRIAN?  No contraindication for GLP1 receptor agonist.  We do lengthy conversation regarding metabolic syndrome and  "insulin resistance.  I believe carbohydrate restriction with a focus on protein increase makes sense for this individual.  She has observed that when she eats a protein-based breakfast she can make it to lunch without feeling particularly hungry.  We will focus on this type of eating throughout the day.  I also wonder if she has an intolerance to some food ingredients (gluten, soy, corn?)  Given her allergies.  That may be contributory as well.  She will meet with our dietitian later today.  She is working to improve movement.  She will use her apple watch and phone and spouse to evaluate quality of sleep.  Consider sleep medicine referral in future.  - Trial of semaglutide.  I suspect that she will get coverage for Ozempic.  This was prescribed using a diagnosis of prediabetes and metabolic syndrome.  If not covered I would prescribe Saxenda using a diagnosis of obesity with comorbidity.  - If she has a high deductible plan which makes these medicines because prohibitive we will try for Mounjaro using  saver coupon for at least 6 to 12 months.  - Follow-up with me 6 weeks to review medication.  - Lastly, no personal/family history of thyroid cancer.  No personal history of pancreatitis.  No obvious contraindications medication.    FOLLOW-UP:   6 weeks.    SUBJECTIVE:     She has the following co-morbidities:       7/25/2022   I have the following health issues associated with obesity: Pre-Diabetes, High Cholesterol, Fatty Liver   I have the following symptoms associated with obesity: Depression, Lower Extremity Swelling, Fatigue     Narrative History:    - she has some anxiety about today's visit.    - recent labs: \"Wake-up call.\"    - looking to change trajectory.     - appointment later today with dietician.   - looking for accountability.    - now walking ~35 minutes (2 miles). Goal 3-5 miles per day.    - \"picky eater.\"  \"I love my carbs.\"     - father: diabetes mellitus.  Dementia.     Patient " "Goals 7/25/2022   I am interested in having a healthier weight to diminish current health problems: Yes   I am interested in having a healthier weight in order to prevent future health problems: Yes   I am interested in having a healthier weight in order to have a future surgery: No     Referring Provider 7/25/2022   Please name the provider who referred you to Medical Weight Management.  If you do not know, please answer: \"I Don't Know\". Dr. Sarika Santiago     Weight History 7/25/2022   How concerned are you about your weight? Very Concerned   Would you describe your weight gain as gradual? Yes   I became overweight: As an Adult   The following factors have contributed to my weight gain:  Mental Health Issues, Eating Wrong Types of Food, Eating Too Much, Lack of Exercise, Genetic (Runs in the Family), Stress   I have tried the following methods to lose weight: Watching Portions or Calories, Weight Watchers   My lowest weight since age 20 was: 126   My highest weight since age 20 was: 211   The most weight I have ever lost was: (lbs) 30   I have the following family history of obesity/being overweight:  My mother is overweight, My father is overweight, One or more of my siblings are overweight, Many of my relatives are overweight   Has anyone in your family had weight loss surgery? Yes   How has your weight changed over the last year?  Gained   How many pounds? 15     Diet Recall Review with Patient 7/25/2022   Do you typically eat breakfast? Yes   If you do eat breakfast, what types of food do you eat? Eggs, bagel or english muffin, sometimes hashbrowns and chicken sausage, greek yogurt ( plain) & banana   Do you typically eat lunch? Yes   If you do eat lunch, what types of food do you typically eat?  Soup, pasta with tomato juice or broth, deli chicken meat, swiss cheese, pickle on bagel   Do you typically eat supper? Yes   If you do eat supper, what types of food do you typically eat? Chicken breast, sometimes stew " meat, hamburger, steak, peppers, cauliflower, broccoli, carrots, onions, potatoes, bread/roll, nice cheese, white and brown rice, farro   Do you typically eat snacks? No   Do you like vegetables?  Yes   Do you drink water? Yes   How many glasses of juice do you drink in a typical day? 0   How many of glasses of milk do you drink in a typical day? 0   If you do drink milk, what type? N/A   How many 8oz glasses of sugar containing drinks such as Satish-Aid/sweet tea do you drink in a day? 0   How many cans/bottles of sugar pop/soda/tea/sports drinks do you drink in a day? 0   How many cans/bottles of diet pop/soda/tea or sports drink do you drink in a day? 0   How often do you have a drink of alcohol? 4 or More Times a Week   If you do drink, how many drinks might you have in a day? 1 or 2       Eating Habits 7/25/2022   Generally, my meals include foods like these: bread, pasta, rice, potatoes, corn, crackers, sweet dessert, pop, or juice. Almost Everyday   Generally, my meals include foods like these: fried meats, brats, burgers, french fries, pizza, cheese, chips, or ice cream. A Few Times a Week   Eat fast food (like [employer], [employer], [employer]). Never   Eat at a buffet or sit-down restaurant. Less Than Weekly   Eat most of my meals in front of the TV or computer. A Few Times a Week   Often skip meals, eat at random times, have no regular eating times. Almost Everyday   Rarely sit down for a meal but snack or graze throughout.  Less Than Weekly   Eat extra snacks between meals. Less Than Weekly   Eat most of my food at the end of the day. A Few Times a Week   Eat in the middle of the night or wake up at night to eat. Never   Eat extra snacks to prevent or correct low blood sugar. Never   Eat to prevent acid reflux or stomach pain. Never   Worry about not having enough food to eat. Never   Have you been to the food shelf at least a few times this year? No   I eat when I am depressed. Almost Everyday   I eat  when I am stressed. Almost Everyday   I eat when I am bored. Never   I eat when I am anxious. Less Than Weekly   I eat when I am happy or as a reward. Less Than Weekly   I feel hungry all the time even if I just have eaten. Less Than Weekly   Feeling full is important to me. Less Than Weekly   I finish all the food on my plate even if I am already full. Almost Everyday   I can't resist eating delicious food or walk past the good food/smell. Never   I eat/snack without noticing that I am eating. Never   I eat when I am preparing the meal. A Few Times a Week   I eat more than usual when I see others eating. Never   I have trouble not eating sweets, ice cream, cookies, or chips if they are around the house. Never   I think about food all day. A Few Times a Week   What foods, if any, do you crave? Chips/Crackers   Please list any other foods you crave? Every now and then a piece of chocolate but usually salty crackerz       Amount of Food 7/25/2022   I make myself vomit what I have eaten or use laxatives to get rid of food. Never   I eat a large amount of food, like a loaf of bread, a box of cookies, a pint/quart of ice cream, all at once. Never   I eat a large amount of food even when I am not hungry. Never   I eat rapidly. Everyday   I eat alone because I feel embarrassed and do not want others to see how much I have eaten. Never   I eat until I am uncomfortably full. Monthly   I feel bad, disgusted, or guilty after I overeat. Monthly   I make myself vomit what I have eaten or use laxatives to get rid of food. Never       Activity/Exercise History 7/25/2022   How much of a typical 12 hour day do you spend sitting? Most of the Day   How much of a typical 12 hour day do you spend lying down? Less Than Half the Day   How much of a typical day do you spend walking/standing? Less Than Half the Day   How many hours (not including work) do you spend on the TV/Video Games/Computer/Tablet/Phone? 6 Hours or More   How many  times a week are you active for the purpose of exercise? Once a Week   What keeps you from being more active? Too tired   How many total minutes do you spend doing some activity for the purpose of exercising when you exercise? More Than 30 Minutes       PAST MEDICAL HISTORY:  Past Medical History:   Diagnosis Date     Arthritis 01/01/1995    not Rheumatoid- occurred during first pregnancy     Asthma     environmental and viral induced     GERD (gastroesophageal reflux disease)      Heart murmur birth    been assessed and deemed not an issue       Work/Social History Reviewed With Patient 7/25/2022   My employment status is: Full-Time   My job is: [employer]   How much of your job is spent on the computer or phone? 100%   How many hours do you spend commuting to work daily?  0%   What is your marital status? /In a Relationship   If in a relationship, is your significant other overweight? No   Do you have children? Yes   If you have children, are they overweight? Yes   Who do you live with?     Are they supportive of your health goals? Yes   Who does the food shopping?  He does     Mental Health History Reviewed With Patient 7/25/2022   Have you ever been physically or sexually abused? No   If yes, do you feel that the abuse is affecting your weight? N/A   If yes, would you like to talk to a counselor about the abuse? N/A   How often in the past 2 weeks have you felt little interest or pleasure in doing things? For Several Days   Over the past 2 weeks how often have you felt down, depressed, or hopeless? For Several Days     Sleep History Reviewed With Patient 7/25/2022   How many hours do you sleep at night? 6   Do you think that you snore loudly or has anybody ever heard you snore loudly (louder than talking or so loud it can be heard behind a shut door)? Yes   Has anyone seen or heard you stop breathing during your sleep? Yes   Do you often feel tired, fatigued, or sleepy during the day? Yes    Do you have a TV/Computer in your bedroom? No     MEDICATIONS:   Current Outpatient Medications   Medication Sig Dispense Refill     acetaminophen (TYLENOL) 500 MG tablet Take 500-1,000 mg by mouth every 6 hours as needed for mild pain       albuterol (PROAIR HFA;PROVENTIL HFA;VENTOLIN HFA) 90 mcg/actuation inhaler [ALBUTEROL (PROAIR HFA;PROVENTIL HFA;VENTOLIN HFA) 90 MCG/ACTUATION INHALER] TAKE 2 PUFFS BY MOUTH EVERY 4 HOURS AS NEEDED 8.5 g 5     albuterol (PROVENTIL) 2.5 mg /3 mL (0.083 %) nebulizer solution [ALBUTEROL (PROVENTIL) 2.5 MG /3 ML (0.083 %) NEBULIZER SOLUTION] Take 3 mL (2.5 mg total) by nebulization every 6 (six) hours as needed for wheezing. 60 vial 1     cholecalciferol, vitamin D3, (VITAMIN D3) 2,000 unit cap [CHOLECALCIFEROL, VITAMIN D3, (VITAMIN D3) 2,000 UNIT CAP] Take by mouth.       fluocinolone acetonide 0.01 % OIL 2-3 drops to affected ear every other night for itching 17 mL 3     ibuprofen (ADVIL/MOTRIN) 200 MG tablet Take 400 mg by mouth every 4 hours as needed for mild pain       loratadine (CLARITIN) 10 mg tablet [LORATADINE (CLARITIN) 10 MG TABLET] Take by mouth.       montelukast (SINGULAIR) 10 MG tablet Take 1 tablet (10 mg) by mouth daily 90 tablet 3     olopatadine (PATANOL) 0.1 % ophthalmic solution [OLOPATADINE (PATANOL) 0.1 % OPHTHALMIC SOLUTION] APPLY ONE DROP TO EYE TWICE DAILY 5 mL 1     semaglutide (OZEMPIC) 2 MG/1.5ML SOPN pen Inject 0.25 mg Subcutaneous every 7 days for 28 days, THEN 0.5 mg every 7 days for 28 days. 3 mg 0     ALLERGIES:   Allergies   Allergen Reactions     Sulfa (Sulfonamide Antibiotics) [Sulfa Drugs] Hives and Other (See Comments)     Turns red, hives, hot     Amoxicillin Cramps, GI Disturbance, Headache, Nausea and Nausea and Vomiting     Sulfamethoxazole-Trimethoprim [Sulfamethoxazole W/Trimethoprim] Other (See Comments)     Patient was very swollen on day 9 on antibiotic     PHYSICAL EXAM:  Physical Exam  Nursing note reviewed.   Constitutional:        General: She is not in acute distress.     Appearance: Normal appearance. She is not ill-appearing.   HENT:      Head: Normocephalic and atraumatic.   Eyes:      Extraocular Movements: Extraocular movements intact.      Conjunctiva/sclera: Conjunctivae normal.   Pulmonary:      Effort: Pulmonary effort is normal.   Neurological:      Mental Status: She is alert and oriented to person, place, and time.   Psychiatric:         Attention and Perception: Attention normal.         Mood and Affect: Mood normal.         Speech: Speech normal.         Thought Content: Thought content normal.       57 minutes spent on the date of the encounter doing chart review, history and exam, documentation and further activities per the note    This note has been dictated using voice recognition software. Any grammatical or context distortions are unintentional and inherent to the software    Sincerely,    John Holman MD

## 2022-08-05 NOTE — PROGRESS NOTES
Jocelyn Quiroga is a 56 year old who is being evaluated via a billable video visit.      How would you like to obtain your AVS? MyChart  If the video visit is dropped, the invitation should be resent by: Send to e-mail at: [email or web address]  Will anyone else be joining your video visit? No      Video Start Time: 1:43 PM      Medical Weight Loss Initial Diet Evaluation  Assessment:  Jocelyn is presenting today for a new weight management nutrition consultation. Pt has had an initial appointment with Dr. Holman.  Weight loss medication: Ozempic.     Anthropometrics:    Pt's weight is 208.4 lbs  Initial weight: 208.4 lbs  Weight change: 0  BMI: There is no height or weight on file to calculate BMI.   Ideal body weight: 50.1 kg (110 lb 7.2 oz)  Adjusted ideal body weight: 67.9 kg (149 lb 10.1 oz)    Estimated RMR ([place]-St Pate equation):  1503 kcals x 1.3 (light active) = 1954 kcals (for weight maintenance)    Recommended Protein Intake: 60-80 grams of protein/day    Medical History:  Patient Active Problem List   Diagnosis     Vitamin D deficiency     Fatigue     Allergic rhinitis     Urticaria     Chronic Allergic Conjunctivitis     Mild intermittent asthma without complication     Allergic rhinitis due to pollen     Allergic Rhinitis Due To Animals     Joint Pain, Localized In The Left Shoulder     Joint Pain, Localized In The Knee     GERD (gastroesophageal reflux disease)     Class 2 severe obesity due to excess calories with serious comorbidity and body mass index (BMI) of 38.0 to 38.9 in adult (H)     Prediabetes     NAFLD (nonalcoholic fatty liver disease)     Metabolic syndrome X      Diabetes: No  HbA1c:  No results found for: HGBA1C    Nutrition History:   Food allergies/intolerances/cultural or religous food customs: No     Vitamins/Mineral Supplementation: Vitamin D     Dietary Recall:  Breakfast: eggs (fried or poached) with 1/2 bagel or english muffin or greek yogurt or oatmeal    Lunch: tends to struggle the most with-deli chicken with slice of cheese, occasional slice of bread/bun or pasta with homemade spaghetti sauce or 1/2-1 cup cottage cheese or leftovers   Dinner: chicken with brown rice or farrow or pasta and veggies (green beans or 1/2 bell pepper) or steak with peppers and potatoes or stir georges with genie rice or stew meat with carrots and potatoes, rice on the side or soup or chili   Typical Snacks: Not really a big snacker, occasional popcorn with butter and salt or nuts or occasional ice cream   Overnight eating: No  Eating out: 0-1 time/week     Beverages: unsweetened iced tea, coffee with half and half, water, occasional glass of wine at supper     Exercise: started this week-walking for ~ 2 miles (goal is to reach 3 miles of walking daily), increased movement throughout the day    Nutrition Diagnosis (PES statement):   Overweight/Obesity (NC 3.3) related to overeating and poor lifestyle habits as evidenced by patient report of excessive energy intake and BMI 38.2 kg/m2.     Nutrition Intervention  1. Food and/or Nutrient Delivery   a. Placed emphasis on importance of developing a healthy meal routine, aiming for 3 meals a day and no snacks.  2. Nutrition Education   a. Discussed with patient how to build a meal: the importance of including a lean/low fat protein at each meal, include a source of vegetables at a minimum of lunch and dinner and limiting carbohydrate intake to <25% per meal.  b. Educated on sources of lean protein, portion sizes, the amount of grams found in each source. Recommend patient to aim for 20-30g protein at each meal.  c. Educated on how to read a food label: keeping total fat <10g and sugar <10g per serving.  d. Discussed the importance of adequate hydration, with emphasis on drinking 64oz of water or zero calorie beverages per day.  3. Nutrition Counseling   a. Encouraged importance of developing routine exercise for health benefits and weight  loss.    Goals established by patient:   1. Start tracking intake via food journal cassandra, aiming for 3240-5532 calories/day.   2. Focus on protein first, aiming for  grams/day.   3. Work on reducing carbohydrates, aiming for 50-75 grams of net carbs.   Handouts provided:  Recipe Resources  Lean Protein Sources     Assessment/Plan:    Pt will follow up in 6 week(s) with bariatrician and 1 month(s) with dietitian.         Video-Visit Details    Type of service:  Video Visit    Video End Time:2:25 PM    Originating Location (pt. Location): Home    Distant Location (provider location):  [facility] [place]     Platform used for Video Visit: Fran Calhoun RD

## 2022-09-07 ENCOUNTER — MYC MEDICAL ADVICE (OUTPATIENT)
Dept: FAMILY MEDICINE | Facility: CLINIC | Age: 57
End: 2022-09-07

## 2022-09-24 ENCOUNTER — HEALTH MAINTENANCE LETTER (OUTPATIENT)
Age: 57
End: 2022-09-24

## 2022-09-27 ENCOUNTER — VIRTUAL VISIT (OUTPATIENT)
Dept: SURGERY | Facility: CLINIC | Age: 57
End: 2022-09-27
Payer: COMMERCIAL

## 2022-09-27 DIAGNOSIS — Z71.3 NUTRITIONAL COUNSELING: ICD-10-CM

## 2022-09-27 DIAGNOSIS — K21.9 GERD (GASTROESOPHAGEAL REFLUX DISEASE): Primary | ICD-10-CM

## 2022-09-27 DIAGNOSIS — E88.810 METABOLIC SYNDROME X: ICD-10-CM

## 2022-09-27 DIAGNOSIS — E66.812 CLASS 2 SEVERE OBESITY DUE TO EXCESS CALORIES WITH SERIOUS COMORBIDITY AND BODY MASS INDEX (BMI) OF 35.0 TO 35.9 IN ADULT (H): ICD-10-CM

## 2022-09-27 DIAGNOSIS — E66.01 CLASS 2 SEVERE OBESITY DUE TO EXCESS CALORIES WITH SERIOUS COMORBIDITY AND BODY MASS INDEX (BMI) OF 35.0 TO 35.9 IN ADULT (H): ICD-10-CM

## 2022-09-27 PROCEDURE — 97803 MED NUTRITION INDIV SUBSEQ: CPT | Mod: 95 | Performed by: DIETITIAN, REGISTERED

## 2022-09-27 NOTE — LETTER
9/27/2022         RE: Jocelyn Quiroga  [address] [address]        Dear Colleague,    Thank you for referring your patient, Jocelyn Quiroga, to the [facility] [place]. Please see a copy of my visit note below.    Jocelyn Quiroga is a 57 year old who is being evaluated via a billable video visit.      How would you like to obtain your AVS? MyChart  If the video visit is dropped, the invitation should be resent by: Send to e-mail at: [email or web address]  Will anyone else be joining your video visit? No      Video Start Time: 2:18 PM      Medical  Weight Loss Follow-Up Diet Evaluation  Assessment:  Jocelyn is presenting today for a follow up weight management nutrition consultation. Pt has had an initial appointment with Dr. Holman  Weight loss medication: Ozempic.   Pt's weight is 192 lbs   Initial weight: 208.4 lbs   Weight change: 16.4 lbs (down)     No flowsheet data found.  BMI: There is no height or weight on file to calculate BMI.  Ideal body weight: 50.1 kg (110 lb 7.2 oz)  Adjusted ideal body weight: 67.9 kg (149 lb 10.1 oz)    Estimated RMR ([place] Rador equation):   1424 kcals x 1.3 (light active) = 1851 kcals (for weight maintenance)     Recommended Protein Intake: 60-80 grams of protein/day  Patient Active Problem List:  Patient Active Problem List   Diagnosis     Vitamin D deficiency     Fatigue     Allergic rhinitis     Urticaria     Chronic Allergic Conjunctivitis     Mild intermittent asthma without complication     Allergic rhinitis due to pollen     Allergic Rhinitis Due To Animals     Joint Pain, Localized In The Left Shoulder     Joint Pain, Localized In The Knee     GERD (gastroesophageal reflux disease)     Class 2 severe obesity due to excess calories with serious comorbidity and body mass index (BMI) of 38.0 to 38.9 in adult (H)     Prediabetes     NAFLD (nonalcoholic fatty liver disease)      Metabolic syndrome X     Diabetes: No    Progress on goals from last visit: Focused on reduced carbohydrate consumption vs calorie counting.  Patient reports that she has been more aware of portion sizes as well as meal prep.  Patient reports that she has been focusing on protein first at meals as well.    Dietary Recall:  Breakfast: eggs, turkey sausage or greek yogurt  Lunch:chicken or cottage cheese or cheese or soup with lots of beans   Dinner:cottage cheese or spaghetti with zoodles (going to try this week)   Typical snacks: on occasion-almonds  Beverages: water-working on, needs to remind self, Herbal Tea,  Exercise: walking 2 miles/day, some strength training exercises as well (just started)    Nutrition Diagnosis:    Overweight/Obesity (NC 3.3) related to overeating and poor lifestyle habits as evidenced by patient's subjective statements (h/o excessive energy intake) and BMI of 35.2 kg/m2.   Intervention:  1. Food and/or nutrient delivery: balanced meals, adequate protein   2. Nutrition education: none-provided support and encouragement  3. Nutrition counseling: praised patient for positive changes made    Monitoring/Evaluation:    Goals:  1. Work on increased variety in terms of meal planning.       Patient to follow up in 1 month(s) with bariatrician and 2 month(s) with EULOGIO        Video-Visit Details    Type of service:  Video Visit    Video End Time:2:40 PM    Originating Location (pt. Location): Home    Distant Location (provider location):  [facility] [place]     Platform used for Video Visit: Fran Calhoun RD        Again, thank you for allowing me to participate in the care of your patient.        Sincerely,        Sandra Calhoun RD

## 2022-09-27 NOTE — PROGRESS NOTES
Jocelyn Quiroga is a 57 year old who is being evaluated via a billable video visit.      How would you like to obtain your AVS? MyChart  If the video visit is dropped, the invitation should be resent by: Send to e-mail at: [email or web address]  Will anyone else be joining your video visit? No      Video Start Time: 2:18 PM      Medical  Weight Loss Follow-Up Diet Evaluation  Assessment:  Jocelyn is presenting today for a follow up weight management nutrition consultation. Pt has had an initial appointment with Dr. Holman  Weight loss medication: Ozempic.   Pt's weight is 192 lbs   Initial weight: 208.4 lbs   Weight change: 16.4 lbs (down)     No flowsheet data found.  BMI: There is no height or weight on file to calculate BMI.  Ideal body weight: 50.1 kg (110 lb 7.2 oz)  Adjusted ideal body weight: 67.9 kg (149 lb 10.1 oz)    Estimated RMR ([place]-St Pate equation):   1424 kcals x 1.3 (light active) = 1851 kcals (for weight maintenance)     Recommended Protein Intake: 60-80 grams of protein/day  Patient Active Problem List:  Patient Active Problem List   Diagnosis     Vitamin D deficiency     Fatigue     Allergic rhinitis     Urticaria     Chronic Allergic Conjunctivitis     Mild intermittent asthma without complication     Allergic rhinitis due to pollen     Allergic Rhinitis Due To Animals     Joint Pain, Localized In The Left Shoulder     Joint Pain, Localized In The Knee     GERD (gastroesophageal reflux disease)     Class 2 severe obesity due to excess calories with serious comorbidity and body mass index (BMI) of 38.0 to 38.9 in adult (H)     Prediabetes     NAFLD (nonalcoholic fatty liver disease)     Metabolic syndrome X     Diabetes: No    Progress on goals from last visit: Focused on reduced carbohydrate consumption vs calorie counting.  Patient reports that she has been more aware of portion sizes as well as meal prep.  Patient reports that she has been focusing on protein first at  meals as well.    Dietary Recall:  Breakfast: eggs, turkey sausage or greek yogurt  Lunch:chicken or cottage cheese or cheese or soup with lots of beans   Dinner:cottage cheese or spaghetti with zoodles (going to try this week)   Typical snacks: on occasion-almonds  Beverages: water-working on, needs to remind self, Herbal Tea,  Exercise: walking 2 miles/day, some strength training exercises as well (just started)    Nutrition Diagnosis:    Overweight/Obesity (NC 3.3) related to overeating and poor lifestyle habits as evidenced by patient's subjective statements (h/o excessive energy intake) and BMI of 35.2 kg/m2.   Intervention:  1. Food and/or nutrient delivery: balanced meals, adequate protein   2. Nutrition education: none-provided support and encouragement  3. Nutrition counseling: praised patient for positive changes made    Monitoring/Evaluation:    Goals:  1. Work on increased variety in terms of meal planning.       Patient to follow up in 1 month(s) with bariatrician and 2 month(s) with RD        Video-Visit Details    Type of service:  Video Visit    Video End Time:2:40 PM    Originating Location (pt. Location): Home    Distant Location (provider location):  [facility] [place]     Platform used for Video Visit: Fran Calhoun RD

## 2022-10-11 ENCOUNTER — VIRTUAL VISIT (OUTPATIENT)
Dept: FAMILY MEDICINE | Facility: CLINIC | Age: 57
End: 2022-10-11
Attending: FAMILY MEDICINE
Payer: COMMERCIAL

## 2022-10-11 DIAGNOSIS — E88.810 METABOLIC SYNDROME X: ICD-10-CM

## 2022-10-11 DIAGNOSIS — E66.812 CLASS 2 SEVERE OBESITY DUE TO EXCESS CALORIES WITH SERIOUS COMORBIDITY AND BODY MASS INDEX (BMI) OF 38.0 TO 38.9 IN ADULT (H): Primary | ICD-10-CM

## 2022-10-11 DIAGNOSIS — K76.0 NAFLD (NONALCOHOLIC FATTY LIVER DISEASE): ICD-10-CM

## 2022-10-11 DIAGNOSIS — R73.03 PREDIABETES: ICD-10-CM

## 2022-10-11 DIAGNOSIS — E66.01 CLASS 2 SEVERE OBESITY DUE TO EXCESS CALORIES WITH SERIOUS COMORBIDITY AND BODY MASS INDEX (BMI) OF 38.0 TO 38.9 IN ADULT (H): Primary | ICD-10-CM

## 2022-10-11 PROCEDURE — 99213 OFFICE O/P EST LOW 20 MIN: CPT | Mod: 95 | Performed by: FAMILY MEDICINE

## 2022-10-11 ASSESSMENT — ENCOUNTER SYMPTOMS: CONSTITUTIONAL NEGATIVE: 1

## 2022-10-11 NOTE — PROGRESS NOTES
Elis is a 57 year old who is being evaluated via a billable video visit.      How would you like to obtain your AVS? MyChart  If the video visit is dropped, the invitation should be resent by: [phone]  Will anyone else be joining your video visit? No    Video-Visit Details    Video Start Time: 10:08 AM    Type of service:  Video Visit    Video End Time:10:28 AM    Originating Location (pt. Location): Home    Distant Location (provider location):  [facility]     Platform used for Video Visit: [employer]    Problem List Items Addressed This Visit     Class 2 severe obesity due to excess calories with serious comorbidity and body mass index (BMI) of 38.0 to 38.9 in adult (H) - Primary     57 year old year old female in clinic today to discuss treatment of the following conditions through diet and lifestyle modification and weight loss:  1. Class 2 severe obesity due to excess calories with serious comorbidity and body mass index (BMI) of 38.0 to 38.9 in adult (H)    2. Prediabetes    3. Metabolic syndrome X    4. NAFLD (nonalcoholic fatty liver disease)      The patient's weight loss result since the last visit was successful based on weight loss.  She is doing great with nutrition change.  Energy is excellent. She is working to add resistance training.     - continue ozempic at 0.5mg/week.  Titrate as needed.    - discussed nutrition   - meet with dietician.   - follow-up 2-4 months.          Relevant Medications    semaglutide (OZEMPIC) 2 MG/1.5ML SOPN pen    Metabolic syndrome X    Relevant Medications    semaglutide (OZEMPIC) 2 MG/1.5ML SOPN pen    NAFLD (nonalcoholic fatty liver disease)    Relevant Medications    semaglutide (OZEMPIC) 2 MG/1.5ML SOPN pen    Prediabetes    Relevant Medications    semaglutide (OZEMPIC) 2 MG/1.5ML SOPN pen      Follow-up Visit   Expected date:  Dec 11, 2022 (Approximate)      Follow Up Appointment Details:     Follow-up with whom?: Me    Follow-Up for what?:  "Chronic Disease f/u    Chronic Disease f/u: General (Other)    Additional Details: Weight loss visit    How?: In Person    Is this an as-needed follow-up?: No                  Subjective   Elis is a 57 year old who presents for the following health issues   Chief Complaint   Patient presents with     Weight Loss     F/u      Patient presents for treatment of chronic, comorbid conditions using intensive therapeutic lifestyle interventions including nutrition, physical activity, and behavior management.   - successes: she reports that she has been doing well.  She has been hitting 50-75 grams of carbohydrates most days.  She met with the dietician and found new recipes.  She has been eating a lot of veggies.   - struggles: having the right foods at home when she is busy.  Getting to the store.  She misses rice.     - exercise plan: walking daily.  1-3 miles per day.  \"Moving throughout the day.\"  She still aspires to incorporate strength training into her regimen.  Her [employer] has not consistently been open.    - tracking/journaling: meal planning initially.  Carb restriction with counting.  \"not counting calories.\" She tries to get enough protein.     - following nutritional plan: overall yes..  Deviations from plan: under eating on a few odd days?   - hunger: controlled.   - medication benefits: helpful. side effects: none.        History of Present Illness       Reason for visit:  Weight management    She eats 4 or more servings of fruits and vegetables daily.She consumes 0 sweetened beverage(s) daily.She exercises with enough effort to increase her heart rate 30 to 60 minutes per day.  She exercises with enough effort to increase her heart rate 7 days per week.   She is taking medications regularly.     Review of Systems   Constitutional: Negative.    All other systems reviewed and are negative.           Objective    Vitals - Patient Reported  Weight (Patient Reported): 85.1 kg (187 lb 9.6 oz)    Vitals:  No vitals " were obtained today due to virtual visit.    Physical Exam  Nursing note reviewed.   Constitutional:       General: She is not in acute distress.     Appearance: Normal appearance. She is not ill-appearing.   HENT:      Head: Normocephalic and atraumatic.   Eyes:      Extraocular Movements: Extraocular movements intact.      Conjunctiva/sclera: Conjunctivae normal.   Pulmonary:      Effort: Pulmonary effort is normal.   Neurological:      Mental Status: She is alert and oriented to person, place, and time.   Psychiatric:         Attention and Perception: Attention normal.         Mood and Affect: Mood normal.         Speech: Speech normal.         Thought Content: Thought content normal.             This note has been dictated using voice recognition software. Any grammatical or context distortions are unintentional and inherent to the software

## 2022-10-11 NOTE — ASSESSMENT & PLAN NOTE
57 year old year old female in clinic today to discuss treatment of the following conditions through diet and lifestyle modification and weight loss:  1. Class 2 severe obesity due to excess calories with serious comorbidity and body mass index (BMI) of 38.0 to 38.9 in adult (H)    2. Prediabetes    3. Metabolic syndrome X    4. NAFLD (nonalcoholic fatty liver disease)      The patient's weight loss result since the last visit was successful based on weight loss.  She is doing great with nutrition change.  Energy is excellent. She is working to add resistance training.     - continue ozempic at 0.5mg/week.  Titrate as needed.    - discussed nutrition   - meet with dietician.   - follow-up 2-4 months.

## 2022-10-19 ENCOUNTER — ALLIED HEALTH/NURSE VISIT (OUTPATIENT)
Dept: FAMILY MEDICINE | Facility: CLINIC | Age: 57
End: 2022-10-19
Payer: COMMERCIAL

## 2022-10-19 DIAGNOSIS — Z23 ENCOUNTER FOR IMMUNIZATION: Primary | ICD-10-CM

## 2022-10-19 PROCEDURE — 90750 HZV VACC RECOMBINANT IM: CPT

## 2022-10-19 PROCEDURE — 99207 PR NO CHARGE NURSE ONLY: CPT

## 2022-10-19 PROCEDURE — 90471 IMMUNIZATION ADMIN: CPT

## 2022-10-28 ENCOUNTER — MYC MEDICAL ADVICE (OUTPATIENT)
Dept: FAMILY MEDICINE | Facility: CLINIC | Age: 57
End: 2022-10-28

## 2022-11-14 ENCOUNTER — VIRTUAL VISIT (OUTPATIENT)
Dept: SURGERY | Facility: CLINIC | Age: 57
End: 2022-11-14
Payer: COMMERCIAL

## 2022-11-14 DIAGNOSIS — E88.810 METABOLIC SYNDROME X: ICD-10-CM

## 2022-11-14 DIAGNOSIS — Z71.3 NUTRITIONAL COUNSELING: ICD-10-CM

## 2022-11-14 DIAGNOSIS — E66.811 CLASS 1 OBESITY DUE TO EXCESS CALORIES WITH SERIOUS COMORBIDITY AND BODY MASS INDEX (BMI) OF 33.0 TO 33.9 IN ADULT: ICD-10-CM

## 2022-11-14 DIAGNOSIS — E66.09 CLASS 1 OBESITY DUE TO EXCESS CALORIES WITH SERIOUS COMORBIDITY AND BODY MASS INDEX (BMI) OF 33.0 TO 33.9 IN ADULT: ICD-10-CM

## 2022-11-14 DIAGNOSIS — K21.9 GERD (GASTROESOPHAGEAL REFLUX DISEASE): Primary | ICD-10-CM

## 2022-11-14 PROCEDURE — 97803 MED NUTRITION INDIV SUBSEQ: CPT | Mod: 95 | Performed by: DIETITIAN, REGISTERED

## 2022-11-14 NOTE — LETTER
11/14/2022         RE: Jocelyn Quiroga  [address] [facility] [address]        Dear Colleague,    Thank you for referring your patient, Jocelyn Quiroga, to the [facility] [place]. Please see a copy of my visit note below.    Jocelyn Quiroga is a 57 year old who is being evaluated via a billable video visit.      How would you like to obtain your AVS? MyChart  If the video visit is dropped, the invitation should be resent by: Send to e-mail at: [email or web address]  Will anyone else be joining your video visit? No        Medical  Weight Loss Follow-Up Diet Evaluation  Assessment:  Jocelyn is presenting today for a follow up weight management nutrition consultation. Pt has had an initial appointment with Dr. Holman  Weight loss medication: Ozempic.   Pt's weight is 183 lbs   Initial weight: 208.4 lbs   Weight change: 25.4 lbs (down)    No flowsheet data found.  BMI: There is no height or weight on file to calculate BMI.  Patient weight not recorded    Estimated RMR ([place]-St Rador equation):   1383 kcals x 1.3 (light active) = 1798 kcals (for weight maintenance)     Recommended Protein Intake: 60-80 grams of protein/day  Patient Active Problem List:  Patient Active Problem List   Diagnosis     Vitamin D deficiency     Fatigue     Allergic rhinitis     Urticaria     Chronic Allergic Conjunctivitis     Mild intermittent asthma without complication     Allergic rhinitis due to pollen     Allergic Rhinitis Due To Animals     Joint Pain, Localized In The Left Shoulder     Joint Pain, Localized In The Knee     GERD (gastroesophageal reflux disease)     Class 2 severe obesity due to excess calories with serious comorbidity and body mass index (BMI) of 38.0 to 38.9 in adult (H)     Prediabetes     NAFLD (nonalcoholic fatty liver disease)     Metabolic syndrome X     Diabetes: No     Progress on goals from last visit: Has recently hit a  plateau, noting that she needs to start incorporating some strength training into her routine.  Patient reports that she has noticed increased cravings more recently as well.  Patient reports that she continues to try and keep her carbs down.  Patient reports that she tends to lack a little in regards to meal planning.        Beverages: water, unsweetened tea, coffee in the AM  Exercise: walking 3 miles/day     Nutrition Diagnosis:    Overweight/Obesity (NC 3.3) related to overeating and poor lifestyle habits as evidenced by patient's subjective statements (h/o excessive energy intake) and BMI of 33.5 kg/m2.     Intervention:  1. Food and/or nutrient delivery: balanced meals, adequate protein   2. Nutrition education: 50 Things to Do Instead of Snacking  3. Nutrition counseling: exercise regimen     Monitoring/Evaluation:    Goals:  1. Incorporate strength training into routine 2 times/week.   2. Replace cravings/snacking with an activity.     Patient to follow up in 1 month(s) with bariatrician and 2 month(s) with RD      Video-Visit Details    Type of service:  Video Visit    Video Start Time (time video started): 1:00 pm    Video End Time (time video stopped): 1:20 pm    Originating Location (pt. Location): Home        Distant Location (provider location):  Off-site    Mode of Communication:  Video Conference via [facility]    Physician has received verbal consent for a Video Visit from the patient? Yes      Sandra Calhoun RD            Again, thank you for allowing me to participate in the care of your patient.        Sincerely,        Sandra Calhoun RD

## 2022-11-14 NOTE — PROGRESS NOTES
Jocelyn Quiroga is a 57 year old who is being evaluated via a billable video visit.      How would you like to obtain your AVS? MyChart  If the video visit is dropped, the invitation should be resent by: Send to e-mail at: [email or web address]  Will anyone else be joining your video visit? No        Medical  Weight Loss Follow-Up Diet Evaluation  Assessment:  Jocelyn is presenting today for a follow up weight management nutrition consultation. Pt has had an initial appointment with Dr. Holman  Weight loss medication: Ozempic.   Pt's weight is 183 lbs   Initial weight: 208.4 lbs   Weight change: 25.4 lbs (down)    No flowsheet data found.  BMI: There is no height or weight on file to calculate BMI.  Patient weight not recorded    Estimated RMR (Yung Pate equation):   1383 kcals x 1.3 (light active) = 1798 kcals (for weight maintenance)     Recommended Protein Intake: 60-80 grams of protein/day  Patient Active Problem List:  Patient Active Problem List   Diagnosis     Vitamin D deficiency     Fatigue     Allergic rhinitis     Urticaria     Chronic Allergic Conjunctivitis     Mild intermittent asthma without complication     Allergic rhinitis due to pollen     Allergic Rhinitis Due To Animals     Joint Pain, Localized In The Left Shoulder     Joint Pain, Localized In The Knee     GERD (gastroesophageal reflux disease)     Class 2 severe obesity due to excess calories with serious comorbidity and body mass index (BMI) of 38.0 to 38.9 in adult (H)     Prediabetes     NAFLD (nonalcoholic fatty liver disease)     Metabolic syndrome X     Diabetes: No     Progress on goals from last visit: Has recently hit a plateau, noting that she needs to start incorporating some strength training into her routine.  Patient reports that she has noticed increased cravings more recently as well.  Patient reports that she continues to try and keep her carbs down.  Patient reports that she tends to lack a little in  regards to meal planning.        Beverages: water, unsweetened tea, coffee in the AM  Exercise: walking 3 miles/day     Nutrition Diagnosis:    Overweight/Obesity (NC 3.3) related to overeating and poor lifestyle habits as evidenced by patient's subjective statements (h/o excessive energy intake) and BMI of 33.5 kg/m2.     Intervention:  1. Food and/or nutrient delivery: balanced meals, adequate protein   2. Nutrition education: 50 Things to Do Instead of Snacking  3. Nutrition counseling: exercise regimen     Monitoring/Evaluation:    Goals:  1. Incorporate strength training into routine 2 times/week.   2. Replace cravings/snacking with an activity.     Patient to follow up in 1 month(s) with bariatrician and 2 month(s) with RD      Video-Visit Details    Type of service:  Video Visit    Video Start Time (time video started): 1:00 pm    Video End Time (time video stopped): 1:20 pm    Originating Location (pt. Location): Home        Distant Location (provider location):  Off-site    Mode of Communication:  Video Conference via [facility]    Physician has received verbal consent for a Video Visit from the patient? Yes      Sandra Calhoun RD

## 2022-12-07 ENCOUNTER — MYC MEDICAL ADVICE (OUTPATIENT)
Dept: FAMILY MEDICINE | Facility: CLINIC | Age: 57
End: 2022-12-07

## 2022-12-07 DIAGNOSIS — K76.0 NAFLD (NONALCOHOLIC FATTY LIVER DISEASE): ICD-10-CM

## 2022-12-07 DIAGNOSIS — R73.03 PREDIABETES: ICD-10-CM

## 2022-12-07 DIAGNOSIS — E88.810 METABOLIC SYNDROME X: Primary | ICD-10-CM

## 2022-12-08 NOTE — TELEPHONE ENCOUNTER
Please advise on which dose you would like to send in, most recently on   semaglutide (OZEMPIC) 2 MG/1.5ML SOPN pen 1.5 mL 5 10/11/2022  --   Sig - Route: Inject 0.5 mg Subcutaneous every 7 days - Subcutaneous

## 2023-01-04 DIAGNOSIS — E88.810 METABOLIC SYNDROME X: ICD-10-CM

## 2023-01-04 DIAGNOSIS — K76.0 NAFLD (NONALCOHOLIC FATTY LIVER DISEASE): ICD-10-CM

## 2023-01-04 DIAGNOSIS — R73.03 PREDIABETES: ICD-10-CM

## 2023-01-04 NOTE — TELEPHONE ENCOUNTER
Medication Request  Medication name:   Semaglutide, 1 MG/DOSE, 4 MG/3ML SOPN  Sig - Route: Inject 1 mg Subcutaneous once a week - Subcutaneous  Requested Pharmacy: [facility] [id number]  When was patient last seen for this?:  10/11/22  Patient offered appointment:  01/31/23  Okay to leave a detailed message: no    Patients pharmacy states that her insurance requires 90-day Rx fills and are requesting a new rx be sent over.     Please advise.

## 2023-01-05 NOTE — ADDENDUM NOTE
Encounter addended by: Radha Perez, PT on: 1/5/2023 4:22 PM   Actions taken: Clinical Note Signed, Episode resolved

## 2023-01-08 NOTE — PROGRESS NOTES
[facility] Rehabilitation Service    Outpatient Physical Therapy Discharge Note  Patient: Jocelyn Quiroga  : 1965    Beginning/End Dates of Reporting Period:  22    Referring Provider: Sarah Santiago MD    Therapy Diagnosis: Impingement syndrome R, subacromial inflammation L     Client Self Report: See eval note    Objective Measurements:  See eval note    Goals:  Goal Identifier Self-management/HEP   Goal Description Patient will be independent in self-management of condition and HEP to reach functional goals.   Target Date 10/06/22   Date Met      Progress (detail required for progress note):  Met     Goal Identifier Bra   Goal Description Patient will be able to unhook her bra behind the back without pain and with full shoulder ROM to improve functional independence.   Target Date 10/06/22   Date Met      Progress (detail required for progress note):  Met     Goal Identifier Overhead lifting   Goal Description Patient will be able to lift a box overhead to put on a shelf with functional ROM and without pain to return to PLOF.   Target Date 10/06/22   Date Met      Progress (detail required for progress note):  Met     Goal Identifier Wiping   Goal Description Patient will be able to wipe her bottom after using the restroom without pain to improve functional independence.   Target Date 10/06/22   Date Met      Progress (detail required for progress note):  Met     Plan:  Discharge from therapy.    Discharge:    Reason for Discharge: Patient cancelled all follow up visits stating her shoulders were much better and exercises are going well.  Goals met.  She has not returned to PT and is appropriate for discharge at this time.    Equipment Issued: Resistance band    Discharge Plan: Patient to continue home program.    Radha Perez, PT, DPT, MHA  2023       MD Evaluation/Progress Note    Diagnosis:    1. Alcohol dependence with uncomplicated withdrawal (CMS/HCC)    2. Cannabis dependence (CMS/[facility])    3. Cigarette nicotine dependence without complication    4. Depression with anxiety    5. Gastroesophageal reflux disease, unspecified whether esophagitis present    6. Lumbar paraspinal muscle spasm    7. Trapezius muscle spasm    8. Mood disorder (CMS/HCC)        Chief Complaint/Problem Status:    Patient Active Problem List   Diagnosis   • Alcohol withdrawal syndrome without complication (CMS/HCC)       General Appearance:  Unremarkable    Orientation:  oriented to person, place, time, and general circumstances    Affect:  Appropriate    Mood:  anxious    Motor Activity:  Normal    Gait/Posture:  Normal    Thought Processes:  Appropriate    Psychotic:  None    Speech:  Normal    Suicidal Ideations:  Absent    Homicide Ideations:  Absent    Memory:  Gaps    Attention Span/Concentration:  Fair    Judgment:  Fair    Insight:  Fair    Reliability:  Fair    Laboratory Studies:  Patient's labs were reviewed.     No results for input(s): WBC, RBC, HGB, HCT, PLT, NEUT, LYMP, MON, EOS in the last 72 hours.    No results for input(s): SODIUM, POTASSIUM, CHLORIDE, MG, CO2, ANIONGAP, GLUCOSE, BUN, CREATININE, GFRA, CALCIUM, LAUREEN, ALBUMIN, BILIRUBIN, ALKPT, LACTA, AST, BALT, SKALT, GPT, PROTEIN, GLOB, AGR in the last 72 hours.    Invalid input(s): GRFNA    No results for input(s): GGTP in the last 72 hours.  No results for input(s): MG in the last 72 hours.  No results for input(s): AST in the last 72 hours.  No results for input(s): TSH, T4FREE, T3FREE in the last 72 hours.    Urine Panel  No results for input(s): UOSM, UK, 5UNITR, UCROA, UCL, MENA, UKET, USPG, UPROT, UWBC, URBC, UBILI, UPH, UURO, USPG, UBACTR, UTPELC in the last 72 hours.    Invalid input(s): SPGRAVITY    POC Breath Alcohol testing result        POC Urine Drug Screen Results         Vital Signs:   Patient's vital  signs were reviewed.  Visit Vitals  BP (!) 134/95 (Patient Position: Standing)   Pulse (!) 114   Temp 97.7 °F (36.5 °C) (Oral)   Resp 17   Ht 5' 9\" (1.753 m)   Wt 82.6 kg (182 lb)   SpO2 98%   BMI 26.88 kg/m²       CIWA Scores:   Patient Vitals for the past 1000 hrs:   CIWA-Ar Total Score   01/08/23 0700 1   01/08/23 0000 0   01/07/23 1600 0   01/07/23 0545 0   01/06/23 2130 2   01/06/23 1645 4   01/06/23 1030 4   01/06/23 0200 2   01/05/23 1310 0   01/05/23 0840 0   01/05/23 0440 0   01/04/23 2200 0   01/04/23 2000 0   01/04/23 1805 1       Medications:   Current Facility-Administered Medications   Medication Dose Route Frequency Provider Last Rate Last Admin   • OLANZapine (ZyPREXA) tablet 5 mg  5 mg Oral Nightly Danial Liu MD   5 mg at 01/07/23 2039   • sertraline (ZOLOFT) tablet 50 mg  50 mg Oral Daily Yoandy Diehl MD   50 mg at 01/08/23 0812   • gabapentin (NEURONTIN) capsule 600 mg  600 mg Oral TID Yoandy Diehl MD   600 mg at 01/08/23 0812   • traZODone (DESYREL) tablet 100 mg  100 mg Oral Nightly Yoandy Diehl MD   100 mg at 01/07/23 2040   • pantoprazole (PROTONIX) EC tablet 40 mg  40 mg Oral QAM AC Jose Macdonald MD   40 mg at 01/08/23 0812   • sucralfate (CARAFATE) tablet 1 g  1 g Oral 4x Daily AC & HS Jose Macdonald MD   1 g at 01/08/23 1035   • baclofen (LIORESAL) tablet 10 mg  10 mg Oral 2 times per day Jose Macdonald MD   10 mg at 01/08/23 0812   • nicotine (NICODERM) 14 MG/24HR patch 1 patch  1 patch Transdermal Daily Yoandy Diehl MD   1 patch at 01/08/23 0816     Current Facility-Administered Medications   Medication Dose Route Frequency   • melatonin tablet 6 mg  6 mg Oral Nightly PRN   • benztropine mesylate (COGENTIN) 1 MG/ML injection 1 mg  1 mg Intramuscular Q4H PRN    Or   • benztropine (COGENTIN) tablet 1 mg  1 mg Oral Q4H PRN   • hydrOXYzine (ATARAX) tablet 50 mg  50 mg Oral Q4H PRN   • OLANZapine (ZyPREXA ZYDIS) disintegrating tablet 5 mg  5 mg Oral Q2H PRN   •  LORazepam (ATIVAN) injection 2 mg  2 mg Intramuscular Q4H PRN    Or   • LORazepam (ATIVAN) tablet 2 mg  2 mg Oral Q4H PRN   • LORazepam (ATIVAN) tablet 2 mg  2 mg Oral Q1H PRN    Or   • LORazepam (ATIVAN) injection 2 mg  2 mg Intramuscular Q1H PRN   • haloperidol lactate (HALDOL) 5 MG/ML short-acting injection 5 mg  5 mg Intramuscular Q2H PRN    Or   • haloperidol (HALDOL) tablet 10 mg  10 mg Oral Q2H PRN   • loperamide (IMODIUM) capsule 2 mg  2 mg Oral PRN   • ondansetron (ZOFRAN ODT) disintegrating tablet 4 mg  4 mg Oral BID PRN   • aluminum-magnesium hydroxide-simethicone (MAALOX) 200-200-20 MG/5ML suspension 30 mL  30 mL Oral Q4H PRN   • docusate sodium-sennosides (SENOKOT S) 50-8.6 MG 2 tablet  2 tablet Oral BID PRN   • bisacodyl (DULCOLAX) suppository 10 mg  10 mg Rectal Daily PRN   • magnesium hydroxide (MILK OF MAGNESIA) 400 MG/5ML suspension 30 mL  30 mL Oral Daily PRN   • polyethylene glycol (MIRALAX) packet 17 g  17 g Oral Daily PRN   • nicotine polacrilex (NICORETTE) gum 2 mg  2 mg Oral Q1H PRN   • cloNIDine (CATAPRES) tablet 0.1 mg  0.1 mg Oral TID PRN       Interval Status / Clinical Assessment / Medical Decision Making / Substance Abuse:  36-year-old male seen for ongoing care of depression, anxiety, alcohol dependency and withdrawal.  His detox is going uneventfully at this time.  He has now been started on olanzapine at bedtime after receiving several doses of olanzapine in the last several days.  He notes feeling more calm overall.  He has not been trigger agitated today.  He does note struggles with sleep at this time.  States he is very restless often awakening every hour often with disturbing dreams.  We did discuss potential concepts of REM rebound from his alcohol use.  Also noting the sertraline may help with some long-term memory issues that create stress and anxiety as well as depression.  At the moment he is comfortable with where at.  He would like us to consider possibly increasing his  sleep medication.  I believe a little more time will probably be more beneficial    Precautions:  q 15\"    Treatment:  Individual and Group    Medication Side Effects:  No    Change in Medication(s):  No    Medication side effects and benefits reviewed with the patient.    Assessment for Harm to Self/Others:  Patient has been in control of any aggressive impulses here in the hospital.  Reports feeling more stable    Patient's Response to Treatment:  positive    Case Discussed:  Treatment Team    Reason for Continued Hospitalization:  Ongoing stabilization    Anticipated Discharge Date:  1-3 days    Patient seen, chart reviewed and reviewed with staff for 20 minutes.  More than half of the time spent in counseling/coordination of care.

## 2023-01-16 DIAGNOSIS — J45.20 MILD INTERMITTENT ASTHMA WITHOUT COMPLICATION: ICD-10-CM

## 2023-01-17 RX ORDER — MONTELUKAST SODIUM 10 MG/1
1 TABLET ORAL DAILY
Qty: 90 TABLET | Refills: 3 | Status: SHIPPED | OUTPATIENT
Start: 2023-01-17 | End: 2023-07-21

## 2023-01-17 NOTE — TELEPHONE ENCOUNTER
"Routing refill request to provider for review/approval because:  act    Last Written Prescription Date:  1/20/22  Last Fill Quantity: 90,  # refills: 3   Last office visit provider:  10/11/22     Requested Prescriptions   Pending Prescriptions Disp Refills     montelukast (SINGULAIR) 10 MG tablet [Pharmacy Med Name: MONTELUKAST SOD 10 MG TABLET] 90 tablet 3     Sig: TAKE 1 TABLET (10 MG) BY MOUTH DAILY.       Leukotriene Inhibitors Protocol Failed - 1/16/2023  8:34 AM        Failed - Asthma control assessment score within normal limits in last 6 months     Please review ACT score.           Failed - Recent (6 mo) or future (30 days) visit within the authorizing provider's specialty     Patient had office visit in the last 6 months or has a visit in the next 30 days with authorizing provider or within the authorizing provider's specialty.  See \"Patient Info\" tab in inbasket, or \"Choose Columns\" in Meds & Orders section of the refill encounter.            Passed - Patient is age 12 or older     If patient is under 16, ok to refill using age based dosing.           Passed - Medication is active on med list             Patsy Ibarra RN 01/17/23 10:10 AM  "

## 2023-01-25 ASSESSMENT — ASTHMA QUESTIONNAIRES
QUESTION_3 LAST FOUR WEEKS HOW OFTEN DID YOUR ASTHMA SYMPTOMS (WHEEZING, COUGHING, SHORTNESS OF BREATH, CHEST TIGHTNESS OR PAIN) WAKE YOU UP AT NIGHT OR EARLIER THAN USUAL IN THE MORNING: NOT AT ALL
QUESTION_2 LAST FOUR WEEKS HOW OFTEN HAVE YOU HAD SHORTNESS OF BREATH: NOT AT ALL
QUESTION_4 LAST FOUR WEEKS HOW OFTEN HAVE YOU USED YOUR RESCUE INHALER OR NEBULIZER MEDICATION (SUCH AS ALBUTEROL): NOT AT ALL
QUESTION_1 LAST FOUR WEEKS HOW MUCH OF THE TIME DID YOUR ASTHMA KEEP YOU FROM GETTING AS MUCH DONE AT WORK, SCHOOL OR AT HOME: NONE OF THE TIME
ACT_TOTALSCORE: 25
QUESTION_5 LAST FOUR WEEKS HOW WOULD YOU RATE YOUR ASTHMA CONTROL: COMPLETELY CONTROLLED
ACT_TOTALSCORE: 25

## 2023-01-31 ENCOUNTER — VIRTUAL VISIT (OUTPATIENT)
Dept: FAMILY MEDICINE | Facility: CLINIC | Age: 58
End: 2023-01-31
Attending: FAMILY MEDICINE
Payer: COMMERCIAL

## 2023-01-31 DIAGNOSIS — E66.01 CLASS 2 SEVERE OBESITY DUE TO EXCESS CALORIES WITH SERIOUS COMORBIDITY AND BODY MASS INDEX (BMI) OF 38.0 TO 38.9 IN ADULT (H): Primary | ICD-10-CM

## 2023-01-31 DIAGNOSIS — R73.03 PREDIABETES: ICD-10-CM

## 2023-01-31 DIAGNOSIS — E66.812 CLASS 2 SEVERE OBESITY DUE TO EXCESS CALORIES WITH SERIOUS COMORBIDITY AND BODY MASS INDEX (BMI) OF 38.0 TO 38.9 IN ADULT (H): Primary | ICD-10-CM

## 2023-01-31 DIAGNOSIS — E55.9 VITAMIN D DEFICIENCY: ICD-10-CM

## 2023-01-31 DIAGNOSIS — E88.810 METABOLIC SYNDROME X: ICD-10-CM

## 2023-01-31 DIAGNOSIS — K76.0 NAFLD (NONALCOHOLIC FATTY LIVER DISEASE): ICD-10-CM

## 2023-01-31 PROCEDURE — 99213 OFFICE O/P EST LOW 20 MIN: CPT | Mod: 95 | Performed by: FAMILY MEDICINE

## 2023-01-31 ASSESSMENT — ENCOUNTER SYMPTOMS: CONSTITUTIONAL NEGATIVE: 1

## 2023-01-31 NOTE — PROGRESS NOTES
Elis is a 57 year old who is being evaluated via a billable video visit.      How would you like to obtain your AVS? MyChart  If the video visit is dropped, the invitation should be resent by: Send to e-mail at: [email or web address]  Will anyone else be joining your video visit? No    Video-Visit Details    Type of service:  Video Visit     Originating Location (pt. Location): Home  Distant Location (provider location):  On-site  Platform used for Video Visit: [employer] [facility]    Problem List Items Addressed This Visit     Class 2 severe obesity due to excess calories with serious comorbidity and body mass index (BMI) of 38.0 to 38.9 in adult (H) - Primary     57 year old year old female in clinic today to discuss treatment of the following conditions through diet and lifestyle modification and weight loss:  1. Class 2 severe obesity due to excess calories with serious comorbidity and body mass index (BMI) of 38.0 to 38.9 in adult (H)    2. NAFLD (nonalcoholic fatty liver disease)    3. Prediabetes    4. Metabolic syndrome X    5. Vitamin D deficiency      The patient's weight loss result since the last visit was successful based on ongoing weight loss.  She continues to lose weight through caloric restriction, attention to macros.  She is adding strength training. Overall, she is doing great.  We discussed the potential benefit of increased semaglutide (wegovy or ozempic). For now, no changes were made. If we decide to go up on dosing, I would have her complete a scale measurement in clinic and shift to wegovy.  For now, no changes to plan.  Recheck in 3-6 months.     Follow-up: 3 months           Relevant Medications    Semaglutide, 1 MG/DOSE, (OZEMPIC) 4 MG/3ML pen    Metabolic syndrome X    Relevant Medications    Semaglutide, 1 MG/DOSE, (OZEMPIC) 4 MG/3ML pen    Other Relevant Orders    Basic metabolic panel  (Ca, Cl, CO2, Creat, Gluc, K, Na, BUN)    ALT    Lipid panel reflex to direct LDL Fasting    AST     "NAFLD (nonalcoholic fatty liver disease)    Relevant Medications    Semaglutide, 1 MG/DOSE, (OZEMPIC) 4 MG/3ML pen    Other Relevant Orders    Basic metabolic panel  (Ca, Cl, CO2, Creat, Gluc, K, Na, BUN)    ALT    Lipid panel reflex to direct LDL Fasting    AST    Prediabetes    Relevant Medications    Semaglutide, 1 MG/DOSE, (OZEMPIC) 4 MG/3ML pen    Other Relevant Orders    Basic metabolic panel  (Ca, Cl, CO2, Creat, Gluc, K, Na, BUN)    ALT    Lipid panel reflex to direct LDL Fasting    AST    Vitamin D deficiency      Follow-up Visit   Expected date: Mar 14, 2023      Follow Up Appointment Details:     Follow-up with whom?: Me    Follow-Up for what?: Chronic Disease f/u    Chronic Disease f/u: General (Other)    Additional Details: Weight loss visit    How?: In Person    Is this an as-needed follow-up?: No                     Subjective   Elis is a 57 year old who presents for the following health issues   Chief Complaint   Patient presents with     Weight Loss     F/u      Patient presents for treatment of chronic, comorbid conditions using intensive therapeutic lifestyle interventions including nutrition, physical activity, and behavior management.   - successes: continues to lose weight but slowly.  Holiday season: \"Goal is to survive.\"  She is losing weight.   has lost weight.    - struggles: lots of home projects.  \"queen size quilt.\"     - exercise plan: time with .  She has started to use some weights.  Walking daily. Stairs.    - tracking/journaling: Preps food.  Works toward protein.  Limits carbs to 50-75 grams.day. Focuses on portion control.     - following nutritional plan: yes.  Deviations from plan: holidays   - hunger: controlled.   - medication benefits: helpful. side effects: none.       History of Present Illness       Reason for visit:  Metabolic resistance    She eats 4 or more servings of fruits and vegetables daily.She consumes 0 sweetened beverage(s) daily.She " exercises with enough effort to increase her heart rate 30 to 60 minutes per day.  She exercises with enough effort to increase her heart rate 7 days per week.   She is taking medications regularly.     Review of Systems   Constitutional: Negative.    All other systems reviewed and are negative.       Objective    Vitals - Patient Reported  Weight (Patient Reported): 79.6 kg (175 lb 6.4 oz)    She is down ~15.5%.      Physical Exam  Nursing note reviewed.   Constitutional:       General: She is not in acute distress.     Appearance: Normal appearance. She is not ill-appearing.   HENT:      Head: Normocephalic and atraumatic.   Eyes:      Extraocular Movements: Extraocular movements intact.      Conjunctiva/sclera: Conjunctivae normal.   Pulmonary:      Effort: Pulmonary effort is normal.   Neurological:      Mental Status: She is alert and oriented to person, place, and time.   Psychiatric:         Attention and Perception: Attention normal.         Mood and Affect: Mood normal.         Speech: Speech normal.         Thought Content: Thought content normal.                  This note has been dictated using voice recognition software. Any grammatical or context distortions are unintentional and inherent to the software

## 2023-01-31 NOTE — ASSESSMENT & PLAN NOTE
57 year old year old female in clinic today to discuss treatment of the following conditions through diet and lifestyle modification and weight loss:  1. Class 2 severe obesity due to excess calories with serious comorbidity and body mass index (BMI) of 38.0 to 38.9 in adult (H)    2. NAFLD (nonalcoholic fatty liver disease)    3. Prediabetes    4. Metabolic syndrome X    5. Vitamin D deficiency      The patient's weight loss result since the last visit was successful based on ongoing weight loss.  She continues to lose weight through caloric restriction, attention to macros.  She is adding strength training. Overall, she is doing great.  We discussed the potential benefit of increased semaglutide (wegovy or ozempic). For now, no changes were made. If we decide to go up on dosing, I would have her complete a scale measurement in clinic and shift to wegovy.  For now, no changes to plan.  Recheck in 3-6 months.     Follow-up: 3 months

## 2023-03-31 NOTE — PROGRESS NOTES
07/14/22 1200   General Information   Type of Visit Initial OP Ortho PT Evaluation   Start of Care Date 07/14/22   Referring Physician Sarah Santiago MD   Patient/Family Goals Statement Regain movement in R shoulder   Orders Evaluate and Treat   Date of Order 07/06/22   Certification Required? No   Medical Diagnosis M25.511, G89.29 (ICD-10-CM) - Chronic right shoulder pain   Surgical/Medical history reviewed Yes   Precautions/Limitations no known precautions/limitations       Present No   Body Part(s)   Body Part(s) Shoulder   Presentation and Etiology   Pertinent history of current problem (include personal factors and/or comorbidities that impact the POC) The patient reports that her L arm is dominant.  She had an injury in October 2021.  The R arm grabbed the railing and the L grabbed the wall.  The L side has been feeling better but R continues to be painful.  She will do a lot of stretching of the shoulder, which is helpful.  She does get deep tissue massages and can move her shoulder better.  Reaching is painful but she is able to do it.   Impairments A. Pain;D. Decreased ROM;E. Decreased flexibility;F. Decreased strength and endurance   Functional Limitations perform activities of daily living   Symptom Location R shoulder > L   How/Where did it occur With a fall   Onset date of current episode/exacerbation 10/01/21   Chronicity Chronic   Pain rating (0-10 point scale) Best (/10);Worst (/10)   Best (/10) 4   Worst (/10) 8   Pain quality A. Sharp;H. Other   Pain quality comment throbbing   Frequency of pain/symptoms C. With activity   Pain/symptoms are: Worse during the night   Pain/symptoms exacerbated by M. Other   Pain exacerbation comment Sleeping on R side, reaching behind back, reaching up, donning/doffing shirt   Pain/symptoms eased by C. Rest;E. Changing positions   Progression of symptoms since onset: Other   Pain progression comment L is improved, R is slightly improved    Current Level of Function   Patient role/employment history A. Employed   Employment Comments    Living environment [place]   Fall Risk Screen   Fall screen completed by PT   Have you fallen 2 or more times in the past year? No   Have you fallen and had an injury in the past year? Yes   Is patient a fall risk? No   Fall screen comments Slipped on the stairs.   Abuse Screen (yes response referral indicated)   Feels Unsafe at Home or Work/School no   Feels Threatened by Someone no   Does Anyone Try to Keep You From Having Contact with Others or Doing Things Outside Your Home? no   Physical Signs of Abuse Present no   Patient needs abuse support services and resources No   System Outcome Measures   Outcome Measures   (SPADI: 47.7%)   Shoulder Objective Findings   Side (if bilateral, select both right and left) Right;Left   Cervical Screen (ROM, quadrant) WNL   Thoracic Mobility Screen WNL   Neer's Test (+) R   Cotton-Lenny Test (+) R   Shoulder Special Tests Comments Empty can test (+) R   Palpation Tenderness subacromial space L, subacromial space, bicep tendon, posterior capsule R.   Accessory Motion/Joint Mobility Pain AC joint L, hypomobility/posterior capsule tightness R shoulder   Right Shoulder Flexion AROM 151   Left Shoulder Flexion AROM 167   Right Shoulder Abduction AROM 161   Left Shoulder Abduction AROM 173 with pain   Right Shoulder ER AROM Pain 67   Left Shoulder ER AROM Pain 82   Right Shoulder IR AROM Pain 57   Left Shoulder IR AROM Pain 57   Right Shoulder Flexion Strength 4+   Left Shoulder Flexion Strength 5   Right Shoulder Abduction Strength 4+   Left Shoulder Abduction Strength 5   Right Shoulder ER Strength 4   Left Shoulder ER Strength 4+   Right Shoulder IR Strength 4+   Left Shoulder IR Strength 4+   Planned Therapy Interventions   Planned Therapy Interventions joint mobilization;manual therapy;neuromuscular re-education;ROM;strengthening;stretching    Clinical Impression   Criteria for Skilled Therapeutic Interventions Met yes, treatment indicated   PT Diagnosis Impingement syndrome R, subacromial inflammation L   Influenced by the following impairments Strength, ROM, posture, pain   Functional limitations due to impairments Reaching overhead, reaching behind her back, unhooking bra, lifting overhead   Clinical Presentation Stable/Uncomplicated   Clinical Decision Making (Complexity) Low complexity   Therapy Frequency 1 time/week   Predicted Duration of Therapy Intervention (days/wks) 12 weeks   Risk & Benefits of therapy have been explained Yes   Patient, Family & other staff in agreement with plan of care Yes   Clinical Impression Comments Elis Quiroga is a 56 year old female who presents to PT with c/o R > L shoulder pain.  Initial onset of symptoms was 10/2021 after slipping on the stairs and catching her R hand on the railing and L on the wall.  She has PMH positive for asthma, GERD, and morbid obesity.  Pain symptoms are described as sharp and throbbing, occurring with activity, and rated 4-8/10.  She is having difficulty with reaching behind her head, reaching overhead, reaching behind her back, donning bra, and wiping after using the restroom.  On exam, patient demonstrates impaired shoulder ROM, (+) empty can R, (+) H-K R, (+) Neer R, tenderness at her subacromial space bilaterally, and impaired shoulder strength.  Her symptoms are most consistent with impingement syndrome of the R shoulder and inflammation in the subacromial space on the L.  She is appropriate for skilled 1:1 PT services to address the listed impairments and return to function.   ORTHO GOALS   PT Ortho Eval Goals 1;2;3;4   Ortho Goal 1   Goal Identifier Self-management/HEP   Goal Description Patient will be independent in self-management of condition and HEP to reach functional goals.   Target Date 10/06/22   Ortho Goal 2   Goal Identifier Bra   Goal Description Patient will be able  to unhook her bra behind the back without pain and with full shoulder ROM to improve functional independence.   Target Date 10/06/22   Ortho Goal 3   Goal Identifier Overhead lifting   Goal Description Patient will be able to lift a box overhead to put on a shelf with functional ROM and without pain to return to PLOF.   Target Date 10/06/22   Ortho Goal 4   Goal Identifier Wiping   Goal Description Patient will be able to wipe her bottom after using the restroom without pain to improve functional independence.   Target Date 10/06/22   Total Evaluation Time   PT Eval, Low Complexity Minutes ([id number]) 31     Radha Perez, PT, DPT, A  7/14/2022       0 = understands/communicates without difficulty

## 2023-05-10 ENCOUNTER — VIRTUAL VISIT (OUTPATIENT)
Dept: FAMILY MEDICINE | Facility: CLINIC | Age: 58
End: 2023-05-10
Payer: COMMERCIAL

## 2023-05-10 DIAGNOSIS — E55.9 VITAMIN D DEFICIENCY: ICD-10-CM

## 2023-05-10 DIAGNOSIS — E66.811 CLASS 1 OBESITY DUE TO EXCESS CALORIES WITH SERIOUS COMORBIDITY AND BODY MASS INDEX (BMI) OF 30.0 TO 30.9 IN ADULT: Primary | ICD-10-CM

## 2023-05-10 DIAGNOSIS — E66.09 CLASS 1 OBESITY DUE TO EXCESS CALORIES WITH SERIOUS COMORBIDITY AND BODY MASS INDEX (BMI) OF 30.0 TO 30.9 IN ADULT: Primary | ICD-10-CM

## 2023-05-10 DIAGNOSIS — R73.03 PREDIABETES: ICD-10-CM

## 2023-05-10 DIAGNOSIS — E88.810 METABOLIC SYNDROME X: ICD-10-CM

## 2023-05-10 DIAGNOSIS — K76.0 NAFLD (NONALCOHOLIC FATTY LIVER DISEASE): ICD-10-CM

## 2023-05-10 DIAGNOSIS — E78.00 HYPERCHOLESTEREMIA: ICD-10-CM

## 2023-05-10 DIAGNOSIS — L50.9 URTICARIA, UNSPECIFIED: ICD-10-CM

## 2023-05-10 PROCEDURE — 99213 OFFICE O/P EST LOW 20 MIN: CPT | Mod: VID | Performed by: FAMILY MEDICINE

## 2023-05-10 ASSESSMENT — ENCOUNTER SYMPTOMS: CONSTITUTIONAL NEGATIVE: 1

## 2023-05-10 NOTE — PROGRESS NOTES
Elis is a 57 year old who is being evaluated via a billable video visit.      How would you like to obtain your AVS? MyChart  If the video visit is dropped, the invitation should be resent by: Send to e-mail at: [email or web address]  Will anyone else be joining your video visit? No     Assessment & Plan   Problem List Items Addressed This Visit     Class 1 obesity due to excess calories with serious comorbidity and body mass index (BMI) of 30.0 to 30.9 in adult - Primary     57 year old year old female in clinic today to discuss treatment of the following conditions through diet and lifestyle modification and weight loss:  1. NAFLD (nonalcoholic fatty liver disease)    2. Class 1 obesity due to excess calories with serious comorbidity and body mass index (BMI) of 30.0 to 30.9 in adult    3. Prediabetes    4. Urticaria    5. Vitamin D deficiency    6. Metabolic syndrome X    7. Hypercholesteremia      BMI likely ~30 today. The patient's weight loss result since the last visit was successful based on ongoign weight loss.  She is down~50lbs since last summer. She has been eating a protein rich, carb restricted diet. She has not been specifically targeting a caloric goal.  is a partner.  She is due for labs and is planning to see Dr. Santiago with summer.    - continue ozempic for now.  She continues to get coverage.   - follow-up in 6 months recommended.   - continue strength training journey.          Relevant Medications    Semaglutide, 1 MG/DOSE, (OZEMPIC) 4 MG/3ML pen    Metabolic syndrome X    Relevant Medications    Semaglutide, 1 MG/DOSE, (OZEMPIC) 4 MG/3ML pen    Other Relevant Orders    Lipid panel reflex to direct LDL Fasting    Comprehensive metabolic panel (BMP + Alb, Alk Phos, ALT, AST, Total. Bili, TP)    Hemoglobin A1c    NAFLD (nonalcoholic fatty liver disease)    Relevant Medications    Semaglutide, 1 MG/DOSE, (OZEMPIC) 4 MG/3ML pen    Other Relevant Orders    Lipid panel reflex to  "direct LDL Fasting    Comprehensive metabolic panel (BMP + Alb, Alk Phos, ALT, AST, Total. Bili, TP)    Prediabetes    Relevant Medications    Semaglutide, 1 MG/DOSE, (OZEMPIC) 4 MG/3ML pen    Other Relevant Orders    Lipid panel reflex to direct LDL Fasting    Comprehensive metabolic panel (BMP + Alb, Alk Phos, ALT, AST, Total. Bili, TP)    Hemoglobin A1c    Urticaria    Vitamin D deficiency   Other Visit Diagnoses     Hypercholesteremia        Relevant Orders    Lipid panel reflex to direct LDL Fasting    Comprehensive metabolic panel (BMP + Alb, Alk Phos, ALT, AST, Total. Bili, TP)           John Holman MD  [facility] BALA Gillis is a 57 year old, presenting for the following health issues:  Weight Loss (F/u)        5/10/2023     7:10 AM   Additional Questions   Roomed by amalia     Patient presents for treatment of chronic, comorbid conditions using intensive therapeutic lifestyle interventions including nutrition, physical activity, and behavior management.   - successes: excited about weight loss.  \"Still coming off.\" clothes are fitting differently.  Better. Sleep is great.   - struggles: wonders about BMI. Allergies are severe this year.  \"Tree pollen.\"     - exercise plan: she can see and feel more muscle. She also feels stronger. She does strength training. She does this both at home and at the gym. Hand weights and body weight. Focusing on shoulder mobility.     - tracking/journaling: ad katalina.  Preps.  Restricts carbohydrates.    - following nutritional plan: yes.  Deviations from plan: infrequent   - hunger: controlled.   - medication benefits: helpful. side effects: none      History of Present Illness       Reason for visit:  Weight loss f/u    She eats 4 or more servings of fruits and vegetables daily.She consumes 0 sweetened beverage(s) daily.She exercises with enough effort to increase her heart rate 30 to 60 minutes per day.  She exercises with enough effort to " increase her heart rate 6 days per week.   She is taking medications regularly.     Review of Systems   Constitutional: Negative.    All other systems reviewed and are negative.         Objective    Vitals - Patient Reported  Weight (Patient Reported): 73.9 kg (163 lb)    Physical Exam  Nursing note reviewed.   Constitutional:       General: She is not in acute distress.     Appearance: Normal appearance. She is not ill-appearing.   HENT:      Head: Normocephalic and atraumatic.   Eyes:      Extraocular Movements: Extraocular movements intact.      Conjunctiva/sclera: Conjunctivae normal.   Pulmonary:      Effort: Pulmonary effort is normal.   Neurological:      Mental Status: She is alert and oriented to person, place, and time.   Psychiatric:         Attention and Perception: Attention normal.         Mood and Affect: Mood normal.         Speech: Speech normal.         Thought Content: Thought content normal.              Video-Visit Details    Type of service:  Video Visit     Originating Location (pt. Location): Home  Distant Location (provider location):  On-site  Platform used for Video Visit: [employer]

## 2023-05-10 NOTE — ASSESSMENT & PLAN NOTE
57 year old year old female in clinic today to discuss treatment of the following conditions through diet and lifestyle modification and weight loss:  1. NAFLD (nonalcoholic fatty liver disease)    2. Class 1 obesity due to excess calories with serious comorbidity and body mass index (BMI) of 30.0 to 30.9 in adult    3. Prediabetes    4. Urticaria    5. Vitamin D deficiency    6. Metabolic syndrome X    7. Hypercholesteremia      BMI likely ~30 today. The patient's weight loss result since the last visit was successful based on ongoign weight loss.  She is down~50lbs since last summer. She has been eating a protein rich, carb restricted diet. She has not been specifically targeting a caloric goal.  is a partner.  She is due for labs and is planning to see Dr. Santiago with summer.    - continue ozempic for now.  She continues to get coverage.   - follow-up in 6 months recommended.   - continue strength training journey.

## 2023-07-06 ENCOUNTER — ANCILLARY PROCEDURE (OUTPATIENT)
Dept: MAMMOGRAPHY | Facility: CLINIC | Age: 58
End: 2023-07-06
Attending: FAMILY MEDICINE
Payer: COMMERCIAL

## 2023-07-06 DIAGNOSIS — Z12.31 VISIT FOR SCREENING MAMMOGRAM: ICD-10-CM

## 2023-07-06 PROCEDURE — 77067 SCR MAMMO BI INCL CAD: CPT

## 2023-07-12 ENCOUNTER — TELEPHONE (OUTPATIENT)
Dept: FAMILY MEDICINE | Facility: CLINIC | Age: 58
End: 2023-07-12
Payer: COMMERCIAL

## 2023-07-12 DIAGNOSIS — K76.0 NAFLD (NONALCOHOLIC FATTY LIVER DISEASE): ICD-10-CM

## 2023-07-12 DIAGNOSIS — E66.09 CLASS 1 OBESITY DUE TO EXCESS CALORIES WITH SERIOUS COMORBIDITY AND BODY MASS INDEX (BMI) OF 30.0 TO 30.9 IN ADULT: ICD-10-CM

## 2023-07-12 DIAGNOSIS — E88.810 METABOLIC SYNDROME X: Primary | ICD-10-CM

## 2023-07-12 DIAGNOSIS — E66.811 CLASS 1 OBESITY DUE TO EXCESS CALORIES WITH SERIOUS COMORBIDITY AND BODY MASS INDEX (BMI) OF 30.0 TO 30.9 IN ADULT: ICD-10-CM

## 2023-07-12 DIAGNOSIS — R73.03 PREDIABETES: ICD-10-CM

## 2023-07-14 ASSESSMENT — ENCOUNTER SYMPTOMS
NERVOUS/ANXIOUS: 0
JOINT SWELLING: 0
HEADACHES: 0
PARESTHESIAS: 0
ARTHRALGIAS: 0
EYE PAIN: 0
HEMATURIA: 0
COUGH: 0
DIZZINESS: 0
SORE THROAT: 0
BREAST MASS: 0
DIARRHEA: 0
FREQUENCY: 0
ABDOMINAL PAIN: 0
DYSURIA: 0
CONSTIPATION: 0
HEARTBURN: 0
HEMATOCHEZIA: 0
MYALGIAS: 0
CHILLS: 0
PALPITATIONS: 0
WEAKNESS: 0
FEVER: 0
SHORTNESS OF BREATH: 0
NAUSEA: 0

## 2023-07-14 NOTE — TELEPHONE ENCOUNTER
PA Initiation    Medication: OZEMPIC (1 MG/DOSE) 4 MG/3ML SC SOPN  Insurance Company: [employer] - Phone [phone] Fax [phone]  Pharmacy Filling the Rx: [id number] [place] [address]  Filling Pharmacy Phone: [phone]  Filling Pharmacy Fax:    Start Date: 7/14/2023    Central Prior Authorization Team   Phone: [phone]

## 2023-07-20 NOTE — TELEPHONE ENCOUNTER
PRIOR AUTHORIZATION DENIED    Medication: OZEMPIC (1 MG/DOSE) 4 MG/3ML SC SOPN  Insurance Company: [employer] - Phone [phone] Fax [phone]  Denial Date: 7/20/2023  Denial Rational:       Appeal Information:     Patient Notified: Yes

## 2023-07-21 ENCOUNTER — OFFICE VISIT (OUTPATIENT)
Dept: FAMILY MEDICINE | Facility: CLINIC | Age: 58
End: 2023-07-21
Attending: FAMILY MEDICINE
Payer: COMMERCIAL

## 2023-07-21 VITALS
DIASTOLIC BLOOD PRESSURE: 60 MMHG | TEMPERATURE: 98.2 F | BODY MASS INDEX: 29.43 KG/M2 | HEIGHT: 62 IN | WEIGHT: 159.9 LBS | RESPIRATION RATE: 18 BRPM | HEART RATE: 76 BPM | SYSTOLIC BLOOD PRESSURE: 110 MMHG | OXYGEN SATURATION: 99 %

## 2023-07-21 DIAGNOSIS — K76.0 NAFLD (NONALCOHOLIC FATTY LIVER DISEASE): ICD-10-CM

## 2023-07-21 DIAGNOSIS — J30.9 ALLERGIC RHINITIS, UNSPECIFIED SEASONALITY, UNSPECIFIED TRIGGER: ICD-10-CM

## 2023-07-21 DIAGNOSIS — E55.9 VITAMIN D DEFICIENCY: ICD-10-CM

## 2023-07-21 DIAGNOSIS — J45.20 MILD INTERMITTENT ASTHMA WITHOUT COMPLICATION: ICD-10-CM

## 2023-07-21 DIAGNOSIS — E88.810 METABOLIC SYNDROME X: ICD-10-CM

## 2023-07-21 DIAGNOSIS — E78.00 HYPERCHOLESTEREMIA: ICD-10-CM

## 2023-07-21 DIAGNOSIS — R73.03 PREDIABETES: ICD-10-CM

## 2023-07-21 DIAGNOSIS — E78.2 MIXED HYPERLIPIDEMIA: ICD-10-CM

## 2023-07-21 DIAGNOSIS — Z00.00 ROUTINE HISTORY AND PHYSICAL EXAMINATION OF ADULT: Primary | ICD-10-CM

## 2023-07-21 DIAGNOSIS — Z23 HIGH PRIORITY FOR 2019-NCOV VACCINE: ICD-10-CM

## 2023-07-21 LAB
ALBUMIN SERPL BCG-MCNC: 4.6 G/DL (ref 3.5–5.2)
ALP SERPL-CCNC: 78 U/L (ref 35–104)
ALT SERPL W P-5'-P-CCNC: 15 U/L (ref 0–50)
ANION GAP SERPL CALCULATED.3IONS-SCNC: 9 MMOL/L (ref 7–15)
AST SERPL W P-5'-P-CCNC: 20 U/L (ref 0–45)
BILIRUB SERPL-MCNC: 0.4 MG/DL
BUN SERPL-MCNC: 15.1 MG/DL (ref 6–20)
CALCIUM SERPL-MCNC: 9.5 MG/DL (ref 8.6–10)
CHLORIDE SERPL-SCNC: 104 MMOL/L (ref 98–107)
CHOLEST SERPL-MCNC: 214 MG/DL
CREAT SERPL-MCNC: 0.82 MG/DL (ref 0.51–0.95)
DEPRECATED HCO3 PLAS-SCNC: 26 MMOL/L (ref 22–29)
GFR SERPL CREATININE-BSD FRML MDRD: 84 ML/MIN/1.73M2
GLUCOSE SERPL-MCNC: 93 MG/DL (ref 70–99)
HBA1C MFR BLD: 5.2 % (ref 0–5.6)
HDLC SERPL-MCNC: 80 MG/DL
HGB BLD-MCNC: 13.5 G/DL (ref 11.7–15.7)
LDLC SERPL CALC-MCNC: 118 MG/DL
NONHDLC SERPL-MCNC: 134 MG/DL
POTASSIUM SERPL-SCNC: 4.6 MMOL/L (ref 3.4–5.3)
PROT SERPL-MCNC: 7.1 G/DL (ref 6.4–8.3)
SODIUM SERPL-SCNC: 139 MMOL/L (ref 136–145)
TRIGL SERPL-MCNC: 81 MG/DL
TSH SERPL DL<=0.005 MIU/L-ACNC: 2.94 UIU/ML (ref 0.3–4.2)

## 2023-07-21 PROCEDURE — 80061 LIPID PANEL: CPT | Performed by: FAMILY MEDICINE

## 2023-07-21 PROCEDURE — 90471 IMMUNIZATION ADMIN: CPT | Performed by: FAMILY MEDICINE

## 2023-07-21 PROCEDURE — 82306 VITAMIN D 25 HYDROXY: CPT | Performed by: FAMILY MEDICINE

## 2023-07-21 PROCEDURE — 84443 ASSAY THYROID STIM HORMONE: CPT | Performed by: FAMILY MEDICINE

## 2023-07-21 PROCEDURE — 83036 HEMOGLOBIN GLYCOSYLATED A1C: CPT | Performed by: FAMILY MEDICINE

## 2023-07-21 PROCEDURE — 90746 HEPB VACCINE 3 DOSE ADULT IM: CPT | Performed by: FAMILY MEDICINE

## 2023-07-21 PROCEDURE — 0124A COVID-19 BIVALENT 12+ (PFIZER): CPT | Performed by: FAMILY MEDICINE

## 2023-07-21 PROCEDURE — 36415 COLL VENOUS BLD VENIPUNCTURE: CPT | Performed by: FAMILY MEDICINE

## 2023-07-21 PROCEDURE — 99396 PREV VISIT EST AGE 40-64: CPT | Mod: 25 | Performed by: FAMILY MEDICINE

## 2023-07-21 PROCEDURE — 80053 COMPREHEN METABOLIC PANEL: CPT | Performed by: FAMILY MEDICINE

## 2023-07-21 PROCEDURE — 91312 COVID-19 BIVALENT 12+ (PFIZER): CPT | Performed by: FAMILY MEDICINE

## 2023-07-21 PROCEDURE — 85018 HEMOGLOBIN: CPT | Performed by: FAMILY MEDICINE

## 2023-07-21 RX ORDER — ALBUTEROL SULFATE 0.83 MG/ML
2.5 SOLUTION RESPIRATORY (INHALATION) EVERY 6 HOURS PRN
Qty: 60 ML | Refills: 3 | Status: SHIPPED | OUTPATIENT
Start: 2023-07-21

## 2023-07-21 RX ORDER — MONTELUKAST SODIUM 10 MG/1
1 TABLET ORAL DAILY
Qty: 90 TABLET | Refills: 3 | Status: SHIPPED | OUTPATIENT
Start: 2023-07-21

## 2023-07-21 RX ORDER — ALBUTEROL SULFATE 90 UG/1
AEROSOL, METERED RESPIRATORY (INHALATION)
Qty: 8.5 G | Refills: 5 | Status: SHIPPED | OUTPATIENT
Start: 2023-07-21

## 2023-07-21 ASSESSMENT — PAIN SCALES - GENERAL: PAINLEVEL: NO PAIN (0)

## 2023-07-21 NOTE — LETTER
My Asthma Action Plan    Name: Jocelyn Quiroga   YOB: 1965  Date: 7/21/2023   My doctor: Sarah Santiago MD   My clinic: [facility]        My Rescue Medicine:   Albuterol inhaler (Proair/Ventolin/Proventil HFA)  2-4 puffs EVERY 4 HOURS as needed. Use a spacer if recommended by your provider.   My Asthma Severity:   Intermittent / Exercise Induced  Know your asthma triggers: upper respiratory infections             GREEN ZONE   Good Control  I feel good  No cough or wheeze  Can work, sleep and play without asthma symptoms       Take your asthma control medicine every day.     If exercise triggers your asthma, take your rescue medication  15 minutes before exercise or sports, and  During exercise if you have asthma symptoms  Spacer to use with inhaler: If you have a spacer, make sure to use it with your inhaler             YELLOW ZONE Getting Worse  I have ANY of these:  I do not feel good  Cough or wheeze  Chest feels tight  Wake up at night   Keep taking your Green Zone medications  Start taking your rescue medicine:  every 20 minutes for up to 1 hour. Then every 4 hours for 24-48 hours.  If you stay in the Yellow Zone for more than 12-24 hours, contact your doctor.  If you do not return to the Green Zone in 12-24 hours or you get worse, start taking your oral steroid medicine if prescribed by your provider.           RED ZONE Medical Alert - Get Help  I have ANY of these:  I feel awful  Medicine is not helping  Breathing getting harder  Trouble walking or talking  Nose opens wide to breathe       Take your rescue medicine NOW  If your provider has prescribed an oral steroid medicine, start taking it NOW  Call your doctor NOW  If you are still in the Red Zone after 20 minutes and you have not reached your doctor:  Take your rescue medicine again and  Call 911 or go to the emergency room right away    See your regular doctor within 2 weeks of an Emergency Room or Urgent Care  visit for follow-up treatment.          Annual Reminders:  Meet with Asthma Educator,  Flu Shot in the Fall, consider Pneumonia Vaccination for patients with asthma (aged 19 and older).    Pharmacy: [facility] [id number] [place] [address]    Electronically signed by Sarah Santiago MD   Date: 07/21/23                    Asthma Triggers  How To Control Things That Make Your Asthma Worse    Triggers are things that make your asthma worse.  Look at the list below to help you find your triggers and   what you can do about them. You can help prevent asthma flare-ups by staying away from your triggers.      Trigger                                                          What you can do   Cigarette Smoke  Tobacco smoke can make asthma worse. Do not allow smoking in your home, car or around you.  Be sure no one smokes at a child s day care or school.  If you smoke, ask your health care provider for ways to help you quit.  Ask family members to quit too.  Ask your health care provider for a referral to Quit Plan to help you quit smoking, or call 2-167-491-PLAN.     Colds, Flu, Bronchitis  These are common triggers of asthma. Wash your hands often.  Don t touch your eyes, nose or mouth.  Get a flu shot every year.     Dust Mites  These are tiny bugs that live in cloth or carpet. They are too small to see. Wash sheets and blankets in hot water every week.   Encase pillows and mattress in dust mite proof covers.  Avoid having carpet if you can. If you have carpet, vacuum weekly.   Use a dust mask and HEPA vacuum.   Pollen and Outdoor Mold  Some people are allergic to trees, grass, or weed pollen, or molds. Try to keep your windows closed.  Limit time out doors when pollen count is high.   Ask you health care provider about taking medicine during allergy season.     Animal Dander  Some people are allergic to skin flakes, urine or saliva from pets with fur or feathers. Keep pets with fur or feathers out of  your home.    If you can t keep the pet outdoors, then keep the pet out of your bedroom.  Keep the bedroom door closed.  Keep pets off cloth furniture and away from stuffed toys.     Mice, Rats, and Cockroaches  Some people are allergic to the waste from these pests.   Cover food and garbage.  Clean up spills and food crumbs.  Store grease in the refrigerator.   Keep food out of the bedroom.   Indoor Mold  This can be a trigger if your home has high moisture. Fix leaking faucets, pipes, or other sources of water.   Clean moldy surfaces.  Dehumidify basement if it is damp and smelly.   Smoke, Strong Odors, and Sprays  These can reduce air quality. Stay away from strong odors and sprays, such as perfume, powder, hair spray, paints, smoke incense, paint, cleaning products, candles and new carpet.   Exercise or Sports  Some people with asthma have this trigger. Be active!  Ask your doctor about taking medicine before sports or exercise to prevent symptoms.    Warm up for 5-10 minutes before and after sports or exercise.     Other Triggers of Asthma  Cold air:  Cover your nose and mouth with a scarf.  Sometimes laughing or crying can be a trigger.  Some medicines and food can trigger asthma.

## 2023-07-21 NOTE — PROGRESS NOTES
SUBJECTIVE:   CC: Elis is an 57 year old who presents for preventive health visit.       7/21/2023     7:59 AM   Additional Questions   Roomed by Saniya Moya   Accompanied by Self     Healthy Habits:     Getting at least 3 servings of Calcium per day:  Yes    Bi-annual eye exam:  Yes    Dental care twice a year:  Yes    Sleep apnea or symptoms of sleep apnea:  None    Diet:  Other    Frequency of exercise:  6-7 days/week    Duration of exercise:  45-60 minutes    Taking medications regularly:  Yes    Medication side effects:  None    Additional concerns today:  No    Reviewed her health history including medications, allergies and social history.  Chart is updated.  She has history of allergic rhinitis and mild intermittent asthma.  Uses albuterol inhaler rarely.  Typically only when she has an upper respiratory infection.         She has regular vision and dental exams.   Up-to-date on pap, mammogram and colonoscopy.     Has been going to [facility].  Has been quite successful with weight loss over the past year.  Currently taking Ozempic.  Has made significant dietary changes and has experience significant weight loss of 50 pounds.  Overall she is feeling much better.     Review of systems is assessed and is otherwise negative.  No other concerns or questions.                     Social History     Tobacco Use     Smoking status: Never     Passive exposure: Never     Smokeless tobacco: Never   Substance Use Topics     Alcohol use: Yes     Alcohol/week: 4.0 standard drinks of alcohol     Comment: Alcoholic Drinks/day: 4-5 times week             7/14/2023     9:09 AM   Alcohol Use   Prescreen: >3 drinks/day or >7 drinks/week? No     Reviewed orders with patient.  Reviewed health maintenance and updated orders accordingly - Yes  Current Outpatient Medications   Medication Sig Dispense Refill     acetaminophen (TYLENOL) 500 MG tablet Take 500-1,000 mg by mouth every 6 hours as needed for mild  pain       albuterol (PROAIR HFA/PROVENTIL HFA/VENTOLIN HFA) 108 (90 Base) MCG/ACT inhaler [ALBUTEROL (PROAIR HFA;PROVENTIL HFA;VENTOLIN HFA) 90 MCG/ACTUATION INHALER] TAKE 2 PUFFS BY MOUTH EVERY 4 HOURS AS NEEDED 8.5 g 5     albuterol (PROVENTIL) (2.5 MG/3ML) 0.083% neb solution Take 1 vial (2.5 mg) by nebulization every 6 hours as needed for shortness of breath or wheezing 60 mL 3     cholecalciferol, vitamin D3, (VITAMIN D3) 2,000 unit cap [CHOLECALCIFEROL, VITAMIN D3, (VITAMIN D3) 2,000 UNIT CAP] Take by mouth.       fluocinolone acetonide 0.01 % OIL 2-3 drops to affected ear every other night for itching 17 mL 3     ibuprofen (ADVIL/MOTRIN) 200 MG tablet Take 400 mg by mouth every 4 hours as needed for mild pain       loratadine (CLARITIN) 10 mg tablet [LORATADINE (CLARITIN) 10 MG TABLET] Take by mouth.       metroNIDAZOLE (METROCREAM) 0.75 % external cream APPLY TO AFFECTED AREA 1-2X DAILY ONGOING       montelukast (SINGULAIR) 10 MG tablet Take 1 tablet (10 mg) by mouth daily 90 tablet 3     olopatadine (PATANOL) 0.1 % ophthalmic solution [OLOPATADINE (PATANOL) 0.1 % OPHTHALMIC SOLUTION] APPLY ONE DROP TO EYE TWICE DAILY 5 mL 1     Semaglutide, 1 MG/DOSE, (OZEMPIC) 4 MG/3ML pen Inject 1 mg Subcutaneous once a week 9 mL 5       Breast Cancer Screening:    FHS-7:       7/1/2022    12:54 PM 7/6/2022     9:33 AM 7/14/2023     9:10 AM   Breast CA Risk Assessment (FHS-7)   Did any of your first-degree relatives have breast or ovarian cancer? No No No   Did any of your relatives have bilateral breast cancer? No No No   Did any man in your family have breast cancer? No No No   Did any woman in your family have breast and ovarian cancer? No No No   Did any woman in your family have breast cancer before age 50 y? No No No   Do you have 2 or more relatives with breast and/or ovarian cancer? No No No   Do you have 2 or more relatives with breast and/or bowel cancer? No No No       Mammogram Screening: Recommended  "mammography every 1-2 years with patient discussion and risk factor consideration  Pertinent mammograms are reviewed under the imaging tab.    History of abnormal Pap smear: NO - age 30-65 PAP every 5 years with negative HPV co-testing recommended      Latest Ref Rng & Units 7/6/2022    10:31 AM 8/5/2015     8:01 AM   PAP / HPV   PAP  Negative for Intraepithelial Lesion or Malignancy (NILM)  Negative for squamous intraepithelial lesion or malignancy  Electronically signed by Isaura Millan CT (ASCP) on 8/14/2015 at 11:33 AM      HPV 16 DNA Negative Negative     HPV 18 DNA Negative Negative     Other HR HPV Negative Negative       Reviewed and updated as needed this visit by clinical staff   Tobacco  Allergies  Meds              Reviewed and updated as needed this visit by Provider    Allergies                  Review of Systems       OBJECTIVE:   /60   Pulse 76   Temp 98.2  F (36.8  C) (Temporal)   Resp 18   Ht 1.575 m (5' 2\")   Wt 72.5 kg (159 lb 14.4 oz)   SpO2 99%   BMI 29.25 kg/m    Physical Exam  GENERAL APPEARANCE: healthy, alert and no distress  EYES: Eyes grossly normal to inspection, PERRL and conjunctivae and sclerae normal  HENT: ear canals and TM's normal, nose and mouth without ulcers or lesions, oropharynx clear and oral mucous membranes moist  RESP: lungs clear to auscultation - no rales, rhonchi or wheezes  BREAST: normal without masses, tenderness or nipple discharge and no palpable axillary masses or adenopathy  CV: regular rate and rhythm, normal S1 S2, no S3 or S4, no murmur, click or rub, no peripheral edema and peripheral pulses strong  ABDOMEN: soft, nontender, no hepatosplenomegaly, no masses and bowel sounds normal  MS: no musculoskeletal defects are noted and gait is age appropriate without ataxia  SKIN: no suspicious lesions or rashes  NEURO: Normal strength and tone, sensory exam grossly normal, mentation intact and speech normal  PSYCH: mentation appears normal " and affect normal/bright        ASSESSMENT/PLAN:   Jocelyn was seen today for physical and imm/inj.    Diagnoses and all orders for this visit:    Routine history and physical examination of adult  -     Hemoglobin; Future  -     Hemoglobin    Mild intermittent asthma without complication  -     montelukast (SINGULAIR) 10 MG tablet; Take 1 tablet (10 mg) by mouth daily  -     albuterol (PROAIR HFA/PROVENTIL HFA/VENTOLIN HFA) 108 (90 Base) MCG/ACT inhaler; [ALBUTEROL (PROAIR HFA;PROVENTIL HFA;VENTOLIN HFA) 90 MCG/ACTUATION INHALER] TAKE 2 PUFFS BY MOUTH EVERY 4 HOURS AS NEEDED  -     albuterol (PROVENTIL) (2.5 MG/3ML) 0.083% neb solution; Take 1 vial (2.5 mg) by nebulization every 6 hours as needed for shortness of breath or wheezing    High priority for 2019-nCoV vaccine    Vitamin D deficiency  -     Vitamin D Deficiency; Future  -     Vitamin D Deficiency    Mixed hyperlipidemia  -     TSH; Future  -     TSH    Prediabetes  -     Cancel: Basic metabolic panel  (Ca, Cl, CO2, Creat, Gluc, K, Na, BUN)  -     Cancel: ALT  -     Lipid panel reflex to direct LDL Fasting  -     Cancel: AST  -     Cancel: Lipid panel reflex to direct LDL Fasting  -     Comprehensive metabolic panel (BMP + Alb, Alk Phos, ALT, AST, Total. Bili, TP)  -     Hemoglobin A1c    Allergic rhinitis, unspecified seasonality, unspecified trigger    NAFLD (nonalcoholic fatty liver disease)  -     Cancel: Basic metabolic panel  (Ca, Cl, CO2, Creat, Gluc, K, Na, BUN)  -     Cancel: ALT  -     Lipid panel reflex to direct LDL Fasting  -     Cancel: AST  -     Cancel: Lipid panel reflex to direct LDL Fasting  -     Comprehensive metabolic panel (BMP + Alb, Alk Phos, ALT, AST, Total. Bili, TP)    Metabolic syndrome X  -     Cancel: Basic metabolic panel  (Ca, Cl, CO2, Creat, Gluc, K, Na, BUN)  -     Cancel: ALT  -     Lipid panel reflex to direct LDL Fasting  -     Cancel: AST  -     Cancel: Lipid panel reflex to direct LDL Fasting  -     Comprehensive  "metabolic panel (BMP + Alb, Alk Phos, ALT, AST, Total. Bili, TP)  -     Hemoglobin A1c    Hypercholesteremia  -     Cancel: Lipid panel reflex to direct LDL Fasting  -     Comprehensive metabolic panel (BMP + Alb, Alk Phos, ALT, AST, Total. Bili, TP)    Other orders  -     PRIMARY CARE FOLLOW-UP SCHEDULING  -     COVID-19 BIVALENT 12+ (PFIZER)  -     HEPATITIS B, ADULT (ENGERIX-B/RECOMBIVAX HB)              COUNSELING:  Reviewed preventive health counseling, as reflected in patient instructions      BMI:   Estimated body mass index is 29.25 kg/m  as calculated from the following:    Height as of this encounter: 1.575 m (5' 2\").    Weight as of this encounter: 72.5 kg (159 lb 14.4 oz).   Weight management plan: Discussed healthy diet and exercise guidelines      She reports that she has never smoked. She has never been exposed to tobacco smoke. She has never used smokeless tobacco.          Sarah Santiago MD  [facility]  "

## 2023-07-21 NOTE — PROGRESS NOTES
Prior to immunization administration, verified patients identity using patient s name and date of birth. Please see Immunization Activity for additional information.     Screening Questionnaire for Adult Immunization    Are you sick today?   No   Do you have allergies to medications, food, a vaccine component or latex?   No   Have you ever had a serious reaction after receiving a vaccination?   No   Do you have a long-term health problem with heart, lung, kidney, or metabolic disease (e.g., diabetes), asthma, a blood disorder, no spleen, complement component deficiency, a cochlear implant, or a spinal fluid leak?  Are you on long-term aspirin therapy?   No   Do you have cancer, leukemia, HIV/AIDS, or any other immune system problem?   No   Do you have a parent, brother, or sister with an immune system problem?   No   In the past 3 months, have you taken medications that affect  your immune system, such as prednisone, other steroids, or anticancer drugs; drugs for the treatment of rheumatoid arthritis, Crohn s disease, or psoriasis; or have you had radiation treatments?   No   Have you had a seizure, or a brain or other nervous system problem?   No   During the past year, have you received a transfusion of blood or blood    products, or been given immune (gamma) globulin or antiviral drug?   No   For women: Are you pregnant or is there a chance you could become       pregnant during the next month?   No   Have you received any vaccinations in the past 4 weeks?   No     Immunization questionnaire answers were all negative.      Patient instructed to remain in clinic for 15 minutes afterwards, and to report any adverse reactions.     Screening performed by Saniya Moya MA on 7/21/2023 at 8:59 AM.    Answers for HPI/ROS submitted by the patient on 7/14/2023  Frequency of exercise:: 6-7 days/week  Getting at least 3 servings of Calcium per day:: Yes  Diet:: Other  Taking medications regularly:: Yes  Medication side  effects:: None  Bi-annual eye exam:: Yes  Dental care twice a year:: Yes  Sleep apnea or symptoms of sleep apnea:: None  abdominal pain: No  Blood in stool: No  Blood in urine: No  chest pain: No  chills: No  congestion: No  constipation: No  cough: No  diarrhea: No  dizziness: No  ear pain: No  eye pain: No  nervous/anxious: No  fever: No  frequency: No  genital sores: No  headaches: No  hearing loss: No  heartburn: No  arthralgias: No  joint swelling: No  peripheral edema: No  mood changes: No  myalgias: No  nausea: No  dysuria: No  palpitations: No  Skin sensation changes: No  sore throat: No  urgency: No  rash: No  shortness of breath: No  visual disturbance: No  weakness: No  pelvic pain: No  vaginal bleeding: No  vaginal discharge: No  tenderness: No  breast mass: No  breast discharge: No  Additional concerns today:: No  Duration of exercise:: 45-60 minutes

## 2023-07-22 NOTE — CONFIDENTIAL NOTE
Notes reviewed from this patient's annual exam dated 7/21/2023.  Markers of metabolic syndrome have resolved.  Liver labs have normalized.  Hemoglobin A1c is now normal as well as fasting glucose level.  She is down approximately 50 pounds over the past year and reported to her primary care provider that she feels well.    Ozempic was refused on grounds that she does not have a diagnosis of type 2 diabetes.    Unknown coverage for Saxenda/Wegovy.  I will submit for Wegovy 1.7 mg/week dosing.  If necessary, we will plan to file appeal for either Wegovy coverage or Ozempic coverage given the demonstrable benefit that she has realized while on this medication.    Message sent to patient with update.

## 2023-07-24 ENCOUNTER — TELEPHONE (OUTPATIENT)
Dept: FAMILY MEDICINE | Facility: CLINIC | Age: 58
End: 2023-07-24
Payer: COMMERCIAL

## 2023-07-24 DIAGNOSIS — E66.09 CLASS 1 OBESITY DUE TO EXCESS CALORIES WITH SERIOUS COMORBIDITY AND BODY MASS INDEX (BMI) OF 30.0 TO 30.9 IN ADULT: ICD-10-CM

## 2023-07-24 DIAGNOSIS — K76.0 NAFLD (NONALCOHOLIC FATTY LIVER DISEASE): ICD-10-CM

## 2023-07-24 DIAGNOSIS — R73.03 PREDIABETES: ICD-10-CM

## 2023-07-24 DIAGNOSIS — E66.811 CLASS 1 OBESITY DUE TO EXCESS CALORIES WITH SERIOUS COMORBIDITY AND BODY MASS INDEX (BMI) OF 30.0 TO 30.9 IN ADULT: ICD-10-CM

## 2023-07-24 DIAGNOSIS — E88.810 METABOLIC SYNDROME X: ICD-10-CM

## 2023-07-24 LAB — DEPRECATED CALCIDIOL+CALCIFEROL SERPL-MC: 66 UG/L (ref 20–75)

## 2023-07-24 NOTE — TELEPHONE ENCOUNTER
"  General Call      Reason for Call:  Semaglutide-Weight Management (WEGOVY) 1.7 MG/0.75ML pen     What are your questions or concerns:  Pharmacy states that they received an RX for this medication, but that it states \"for information purposes only\" they would like this clarified. Requesting a new RX asap if this is needing to be filled.  Osvaldo BUNN approved 07/24/23    Please advise.      "

## 2023-07-25 NOTE — TELEPHONE ENCOUNTER
Left message to call back for: patient x1  Information to relay to patient: Relay providers message below.

## 2023-09-04 ENCOUNTER — MYC REFILL (OUTPATIENT)
Dept: FAMILY MEDICINE | Facility: CLINIC | Age: 58
End: 2023-09-04
Payer: COMMERCIAL

## 2023-09-04 DIAGNOSIS — R73.03 PREDIABETES: ICD-10-CM

## 2023-09-04 DIAGNOSIS — E66.09 CLASS 1 OBESITY DUE TO EXCESS CALORIES WITH SERIOUS COMORBIDITY AND BODY MASS INDEX (BMI) OF 30.0 TO 30.9 IN ADULT: ICD-10-CM

## 2023-09-04 DIAGNOSIS — E88.810 METABOLIC SYNDROME X: ICD-10-CM

## 2023-09-04 DIAGNOSIS — E66.811 CLASS 1 OBESITY DUE TO EXCESS CALORIES WITH SERIOUS COMORBIDITY AND BODY MASS INDEX (BMI) OF 30.0 TO 30.9 IN ADULT: ICD-10-CM

## 2023-09-04 DIAGNOSIS — K76.0 NAFLD (NONALCOHOLIC FATTY LIVER DISEASE): ICD-10-CM

## 2023-09-05 NOTE — TELEPHONE ENCOUNTER
Routing refill request to provider for review/approval because:      Patient comment: Took my last shot yesterday so i need a refill before Sept 9 as i am leaving [facility] Sunday for a week. s

## 2023-12-05 ASSESSMENT — ASTHMA QUESTIONNAIRES: ACT_TOTALSCORE: 25

## 2023-12-08 ENCOUNTER — VIRTUAL VISIT (OUTPATIENT)
Dept: FAMILY MEDICINE | Facility: CLINIC | Age: 58
End: 2023-12-08
Attending: FAMILY MEDICINE
Payer: COMMERCIAL

## 2023-12-08 DIAGNOSIS — E66.811 CLASS 1 OBESITY DUE TO EXCESS CALORIES WITH SERIOUS COMORBIDITY AND BODY MASS INDEX (BMI) OF 30.0 TO 30.9 IN ADULT: Primary | ICD-10-CM

## 2023-12-08 DIAGNOSIS — E66.09 CLASS 1 OBESITY DUE TO EXCESS CALORIES WITH SERIOUS COMORBIDITY AND BODY MASS INDEX (BMI) OF 30.0 TO 30.9 IN ADULT: Primary | ICD-10-CM

## 2023-12-08 DIAGNOSIS — R73.03 PREDIABETES: ICD-10-CM

## 2023-12-08 DIAGNOSIS — K76.0 NAFLD (NONALCOHOLIC FATTY LIVER DISEASE): ICD-10-CM

## 2023-12-08 DIAGNOSIS — E88.810 METABOLIC SYNDROME X: ICD-10-CM

## 2023-12-08 PROCEDURE — 99213 OFFICE O/P EST LOW 20 MIN: CPT | Mod: VID | Performed by: FAMILY MEDICINE

## 2023-12-08 ASSESSMENT — ENCOUNTER SYMPTOMS: CONSTITUTIONAL NEGATIVE: 1

## 2023-12-08 NOTE — PROGRESS NOTES
Elis is a 58 year old who is being evaluated via a billable video visit.      How would you like to obtain your AVS? MyChart  If the video visit is dropped, the invitation should be resent by: Send to e-mail at: [email or web address]  Will anyone else be joining your video visit? No    Assessment & Plan   Problem List Items Addressed This Visit       Class 1 obesity due to excess calories with serious comorbidity and body mass index (BMI) of 30.0 to 30.9 in adult - Primary     58 year old year old female in clinic today to discuss treatment of the following conditions through diet and lifestyle modification and weight loss:  1. Class 1 obesity due to excess calories with serious comorbidity and body mass index (BMI) of 30.0 to 30.9 in adult    2. Prediabetes    3. NAFLD (nonalcoholic fatty liver disease)    4. Metabolic syndrome X    The patient's weight loss result since the last visit was successful based on weight stability.  Overall doing well.  She is barely tolerant to 1.7 mg/week.   Time is short.  Goal of wearing smaller pant size.      Current therapies:    Medications: YES - semaglutide   - Starting weight for GLP1 receptor agonist: 208 lb   - Total weight loss: 53 lbs (2535%)    Nutritional goals/strategies: reviewed.   - caloric restriction: Yes   - time restriction: NO   - diet (macronutrient) framework: high protein/low carbohydrate     Movement:   - varied.  Depends on schedule.     Follow-up: 6 months           Relevant Medications    Semaglutide-Weight Management (WEGOVY) 1.7 MG/0.75ML pen    Metabolic syndrome X    Relevant Medications    Semaglutide-Weight Management (WEGOVY) 1.7 MG/0.75ML pen    NAFLD (nonalcoholic fatty liver disease)    Relevant Medications    Semaglutide-Weight Management (WEGOVY) 1.7 MG/0.75ML pen    Prediabetes    Relevant Medications    Semaglutide-Weight Management (WEGOVY) 1.7 MG/0.75ML pen      John Holman MD  [facility]  "BALA Gillis is a 58 year old, presenting for the following health issues:  Weight Loss (Follow-up/)        12/8/2023     7:15 AM   Additional Questions   Roomed by xl       Patient presents for treatment of chronic, comorbid conditions using intensive therapeutic lifestyle interventions including nutrition, physical activity, and behavior management.   - successes: feels like she is at an okay weight for now.  Thinking a lot about muscle, fit, and feel.  Articulates a certain pant size goals.     - struggles: feels unwell when more permissive in diet. Getting enough water. 12-14 meetings per day.    - generally feels well.     - weight overall has stabilized.    - movement: walks.  Strength training 2x/week.  \"I try to do something 6 days per week.\"    - hunger: Stable.    - medication benefits: helpful. side effects: \"Cold all the time.\"  Foot cramps.     History of Present Illness       Reason for visit:  Follow up on weight management    She eats 2-3 servings of fruits and vegetables daily.She consumes 0 sweetened beverage(s) daily.She exercises with enough effort to increase her heart rate 30 to 60 minutes per day.  She exercises with enough effort to increase her heart rate 6 days per week.   She is taking medications regularly.     Review of Systems   Constitutional: Negative.    All other systems reviewed and are negative.          Objective    Vitals - Patient Reported  Weight (Patient Reported): 70.3 kg (155 lb)    Physical Exam  Nursing note reviewed.   Constitutional:       General: She is not in acute distress.     Appearance: Normal appearance. She is not ill-appearing.   HENT:      Head: Normocephalic and atraumatic.   Eyes:      Extraocular Movements: Extraocular movements intact.      Conjunctiva/sclera: Conjunctivae normal.   Pulmonary:      Effort: Pulmonary effort is normal.   Neurological:      Mental Status: She is alert and oriented to person, place, and time.   Psychiatric:  "        Attention and Perception: Attention normal.         Mood and Affect: Mood normal.         Speech: Speech normal.         Thought Content: Thought content normal.             Video-Visit Details    Type of service:  Video Visit     Originating Location (pt. Location): Home  Distant Location (provider location):  On-site  Platform used for Video Visit: [employer]

## 2023-12-08 NOTE — ASSESSMENT & PLAN NOTE
58 year old year old female in clinic today to discuss treatment of the following conditions through diet and lifestyle modification and weight loss:  1. Class 1 obesity due to excess calories with serious comorbidity and body mass index (BMI) of 30.0 to 30.9 in adult    2. Prediabetes    3. NAFLD (nonalcoholic fatty liver disease)    4. Metabolic syndrome X      The patient's weight loss result since the last visit was successful based on weight stability.  Overall doing well.  She is barely tolerant to 1.7 mg/week.   Time is short.  Goal of wearing smaller pant size.      Current therapies:    Medications: YES - semaglutide   - Starting weight for GLP1 receptor agonist: 208 lb   - Total weight loss: 53 lbs (2535%)    Nutritional goals/strategies: reviewed.   - caloric restriction: Yes   - time restriction: NO   - diet (macronutrient) framework: high protein/low carbohydrate     Movement:   - varied.  Depends on schedule.     Follow-up: 6 months

## 2024-01-29 ENCOUNTER — ALLIED HEALTH/NURSE VISIT (OUTPATIENT)
Dept: FAMILY MEDICINE | Facility: CLINIC | Age: 59
End: 2024-01-29
Payer: COMMERCIAL

## 2024-01-29 VITALS — BODY MASS INDEX: 29.04 KG/M2 | WEIGHT: 157.8 LBS | HEIGHT: 62 IN

## 2024-01-29 DIAGNOSIS — R73.03 PREDIABETES: ICD-10-CM

## 2024-01-29 DIAGNOSIS — E66.09 CLASS 1 OBESITY DUE TO EXCESS CALORIES WITH SERIOUS COMORBIDITY AND BODY MASS INDEX (BMI) OF 30.0 TO 30.9 IN ADULT: Primary | ICD-10-CM

## 2024-01-29 DIAGNOSIS — K76.0 NAFLD (NONALCOHOLIC FATTY LIVER DISEASE): ICD-10-CM

## 2024-01-29 DIAGNOSIS — E66.811 CLASS 1 OBESITY DUE TO EXCESS CALORIES WITH SERIOUS COMORBIDITY AND BODY MASS INDEX (BMI) OF 30.0 TO 30.9 IN ADULT: Primary | ICD-10-CM

## 2024-01-29 DIAGNOSIS — E88.810 METABOLIC SYNDROME X: ICD-10-CM

## 2024-01-29 PROCEDURE — 99207 PR NO CHARGE NURSE ONLY: CPT

## 2024-01-29 NOTE — PROGRESS NOTES
Pt arrives to [facility] for wt and ht check per Dr. Holman.       Ziggy Vital Jr., CMA on 1/29/2024 at 2:03 PM

## 2024-03-15 ENCOUNTER — TRANSFERRED RECORDS (OUTPATIENT)
Dept: MULTI SPECIALTY CLINIC | Facility: CLINIC | Age: 59
End: 2024-03-15
Payer: COMMERCIAL

## 2024-03-20 ENCOUNTER — TELEPHONE (OUTPATIENT)
Dept: FAMILY MEDICINE | Facility: CLINIC | Age: 59
End: 2024-03-20
Payer: COMMERCIAL

## 2024-03-20 NOTE — LETTER
April 6, 2024      Jocelyn Quiroga  [address] [facility] [address]        To Whom It May Concern,       We received a statement of denial for prior authorization process for this individual.  It appears that the reviewer did not take into account this medication in the context of an ongoing therapy plan.  This patient initially started semaglutide in 2022.  At that time, her weight was 208 pounds.  Semaglutide was covered under a diagnosis of metabolic syndrome and obesity.  In 2023, we transition from Ozempic to Wegovy.  Since July 2023 she has been on 1.7 mg of this medication.  Her initial starting weight for semaglutide was 208 pounds (BMI 38 kg/m ).  Her most recent clinic visit showed a weight of 157 pounds.  She has lost 51 pounds.  This corresponds to 24.5% of weight loss.      Her most recent BMI was 29 kg/m  (clinic visit in December).  She has weight related comorbidities including metabolic syndrome, NAFLD, prediabetes.  Based on the letter of denial that I received, she meets criteria to continue therapy based on her initial weight when she started therapy.  She also meets criteria based on her current BMI with comorbidity.    In light of this information, please reconsider the denial.  Please reference her medical records for the past 20 months of this therapy as this is been documented through routine follow-up in clinic.        Sincerely,        John Holman MD  Diplomate - American Board of Obesity Medicine

## 2024-03-20 NOTE — TELEPHONE ENCOUNTER
Prior Authorization Request  Who s requesting:  Pharmacy  Pharmacy Name and Location: [facility] [address]  Medication Name: Semaglutide-Weight Management (WEGOVY) 1.7 MG/0.75ML pen   Insurance Plan: [employer]  Insurance Member ID Number:  [id number]   CoverMyMeds Key:   Informed patient that prior authorizations can take up to 10 business days for response:   NA  Okay to leave a detailed message: No

## 2024-04-02 NOTE — TELEPHONE ENCOUNTER
Central Prior Authorization Team   Phone: [phone]    PA Initiation    Medication: Semaglutide-Weight Management (WEGOVY) 1.7 MG/0.75ML pen   Insurance Company: [employer] - Phone [phone] Fax [phone]  Pharmacy Filling the Rx: [facility] [id number] [place], [place] - [facility] [address]  Filling Pharmacy Phone: [phone]  Filling Pharmacy Fax:    Start Date: 4/2/2024

## 2024-04-18 ENCOUNTER — MYC REFILL (OUTPATIENT)
Dept: FAMILY MEDICINE | Facility: CLINIC | Age: 59
End: 2024-04-18
Payer: COMMERCIAL

## 2024-04-18 DIAGNOSIS — R73.03 PREDIABETES: ICD-10-CM

## 2024-04-18 DIAGNOSIS — E66.811 CLASS 1 OBESITY DUE TO EXCESS CALORIES WITH SERIOUS COMORBIDITY AND BODY MASS INDEX (BMI) OF 30.0 TO 30.9 IN ADULT: ICD-10-CM

## 2024-04-18 DIAGNOSIS — E66.09 CLASS 1 OBESITY DUE TO EXCESS CALORIES WITH SERIOUS COMORBIDITY AND BODY MASS INDEX (BMI) OF 30.0 TO 30.9 IN ADULT: ICD-10-CM

## 2024-04-18 DIAGNOSIS — E88.810 METABOLIC SYNDROME X: ICD-10-CM

## 2024-04-18 DIAGNOSIS — K76.0 NAFLD (NONALCOHOLIC FATTY LIVER DISEASE): ICD-10-CM

## 2024-04-18 NOTE — CONFIDENTIAL NOTE
I wrote a letter on her behalf on 4/6.  Was this processed?    Based on the dispense report, she has probably been off the medicine for a month at this point.

## 2024-04-19 NOTE — TELEPHONE ENCOUNTER
Medication Appeal Initiation    We have initiated an appeal for the requested medication:  Medication: Semaglutide-Weight Management (WEGOVY) 1.7 MG/0.75ML pen -PA DENIED, INITIATE PA APPEAL   Appeal Start Date:  4/19/2024  Insurance Company:  [employer] (FAX) [phone]  Comments:         Initiate PA Appeal via fax [phone] with Letter of Medical Necessity (dated 4/6/2024) attached with Request. Awaiting on a response on Appeal.

## 2024-05-09 ENCOUNTER — MYC REFILL (OUTPATIENT)
Dept: FAMILY MEDICINE | Facility: CLINIC | Age: 59
End: 2024-05-09
Payer: COMMERCIAL

## 2024-05-09 DIAGNOSIS — E66.09 CLASS 1 OBESITY DUE TO EXCESS CALORIES WITH SERIOUS COMORBIDITY AND BODY MASS INDEX (BMI) OF 30.0 TO 30.9 IN ADULT: ICD-10-CM

## 2024-05-09 DIAGNOSIS — R73.03 PREDIABETES: ICD-10-CM

## 2024-05-09 DIAGNOSIS — E88.810 METABOLIC SYNDROME X: ICD-10-CM

## 2024-05-09 DIAGNOSIS — E66.811 CLASS 1 OBESITY DUE TO EXCESS CALORIES WITH SERIOUS COMORBIDITY AND BODY MASS INDEX (BMI) OF 30.0 TO 30.9 IN ADULT: ICD-10-CM

## 2024-05-09 DIAGNOSIS — K76.0 NAFLD (NONALCOHOLIC FATTY LIVER DISEASE): ICD-10-CM

## 2024-05-13 NOTE — TELEPHONE ENCOUNTER
Spoke with pharmacy. Claim is still being denied. No decision letter has been received electronically or by mail to clinic with PA number to provider to pharmacy; only verbal approval given via phone call.    Spoke with patient. She reports she has received decision letter from [facility]. She will present letter to pharmacy.    Refill is not needed at this time. Please remove pended medication and close encounter.

## 2024-06-10 ASSESSMENT — ASTHMA QUESTIONNAIRES
QUESTION_1 LAST FOUR WEEKS HOW MUCH OF THE TIME DID YOUR ASTHMA KEEP YOU FROM GETTING AS MUCH DONE AT WORK, SCHOOL OR AT HOME: NONE OF THE TIME
ACT_TOTALSCORE: 25
ACT_TOTALSCORE: 25
QUESTION_5 LAST FOUR WEEKS HOW WOULD YOU RATE YOUR ASTHMA CONTROL: COMPLETELY CONTROLLED
QUESTION_3 LAST FOUR WEEKS HOW OFTEN DID YOUR ASTHMA SYMPTOMS (WHEEZING, COUGHING, SHORTNESS OF BREATH, CHEST TIGHTNESS OR PAIN) WAKE YOU UP AT NIGHT OR EARLIER THAN USUAL IN THE MORNING: NOT AT ALL
QUESTION_4 LAST FOUR WEEKS HOW OFTEN HAVE YOU USED YOUR RESCUE INHALER OR NEBULIZER MEDICATION (SUCH AS ALBUTEROL): NOT AT ALL
QUESTION_2 LAST FOUR WEEKS HOW OFTEN HAVE YOU HAD SHORTNESS OF BREATH: NOT AT ALL

## 2024-06-13 ENCOUNTER — VIRTUAL VISIT (OUTPATIENT)
Dept: FAMILY MEDICINE | Facility: CLINIC | Age: 59
End: 2024-06-13
Attending: FAMILY MEDICINE
Payer: COMMERCIAL

## 2024-06-13 DIAGNOSIS — E88.810 METABOLIC SYNDROME X: ICD-10-CM

## 2024-06-13 DIAGNOSIS — E66.3 OVERWEIGHT WITH BODY MASS INDEX (BMI) OF 29 TO 29.9 IN ADULT: ICD-10-CM

## 2024-06-13 DIAGNOSIS — R73.03 PREDIABETES: ICD-10-CM

## 2024-06-13 DIAGNOSIS — K76.0 NAFLD (NONALCOHOLIC FATTY LIVER DISEASE): ICD-10-CM

## 2024-06-13 PROCEDURE — G2211 COMPLEX E/M VISIT ADD ON: HCPCS | Mod: 95 | Performed by: FAMILY MEDICINE

## 2024-06-13 PROCEDURE — 99214 OFFICE O/P EST MOD 30 MIN: CPT | Mod: 95 | Performed by: FAMILY MEDICINE

## 2024-06-13 NOTE — PROGRESS NOTES
"Elis is a 58 year old who is being evaluated via a billable video visit.    How would you like to obtain your AVS? MyChart  If the video visit is dropped, the invitation should be resent by: Send to e-mail at: [email or web address]  Will anyone else be joining your video visit? No    Assessment & Plan   Problem List Items Addressed This Visit       Metabolic syndrome X    Relevant Medications    Semaglutide-Weight Management (WEGOVY) 1.7 MG/0.75ML pen    NAFLD (nonalcoholic fatty liver disease)    Relevant Medications    Semaglutide-Weight Management (WEGOVY) 1.7 MG/0.75ML pen    Overweight with body mass index (BMI) of 29 to 29.9 in adult     58 year old year old female in clinic today to discuss treatment of the following conditions through diet and lifestyle modification and weight loss:  1. Overweight with body mass index (BMI) of 29 to 29.9 in adult    2. Prediabetes    3. NAFLD (nonalcoholic fatty liver disease)    4. Metabolic syndrome X      The patient's weight loss result since the last visit was successful based on weight loss maintenance.  Overall she continues to do well. More strength training.  Barriers: consistency of medication coverage, meeting burden with work.  Overall doing well. She has been thoughtful in her approach.  Longterm plan is to ween and get off semaglutide.   \"I want to be stronger.\"     BMI: There is no height or weight on file to calculate BMI.  Ideal body weight: 49.5 kg (109 lb 2.9 oz)  Adjusted ideal body weight: 58.3 kg (128 lb 10.1 oz)    Current therapies:    Medications: YES wegovy at 1.7mg/week.  She has approval through next spring.     - Starting weight for GLP1 receptor agonist: 208 lbs   - Total weight loss: 49 lbs (23.5%)    Nutritional goals/strategies: reviewed.   - caloric restriction: Yes   - time restriction: NO   - diet (macronutrient) framework: high protein/low carbohydrate     Movement:   - walking and more strength    Sleep:    - " "Restless    Follow-up: 6 months           Relevant Medications    Semaglutide-Weight Management (WEGOVY) 1.7 MG/0.75ML pen    Prediabetes    Relevant Medications    Semaglutide-Weight Management (WEGOVY) 1.7 MG/0.75ML pen      The longitudinal plan of care for the diagnosis(es)/condition(s) as documented were addressed during this visit. Due to the added complexity in care, I will continue to support Elis in the subsequent management and with ongoing continuity of care.     BMI  Estimated body mass index is 29.1 kg/m  as calculated from the following:    Height as of 1/29/24: 1.568 m (5' 1.75\").    Weight as of 1/29/24: 71.6 kg (157 lb 12.8 oz).   Weight management plan: Specific weight management program called [facility] discussed    Subjective   Elis is a 58 year old, presenting for the following health issues:  Weight Loss (Follow-up/)        6/13/2024     7:13 AM   Additional Questions   Roomed by xl     Patient presents for treatment of chronic, comorbid conditions using intensive therapeutic lifestyle interventions including nutrition, physical activity, and behavior management.   - successes: some weight gain without the medication.  More hunger. Some weight regain.     - struggles: weight gain while not on medication. 16 meetings in a day.  Sleep has been \"challenging lately.\"   - movement: mores strength training.  \"Family monthly challenge with daughter.\"  Goal: walk 20 miles per week   - tracking/journaling: ad katalina. Protein rich.   - following nutritional plan: yes.  Deviations from plan: infrequent   - hunger: Stable.    - medication benefits: helpful side effects: none    History of Present Illness       Reason for visit:  Follow up on weight management    She eats 2-3 servings of fruits and vegetables daily.She consumes 0 sweetened beverage(s) daily.She exercises with enough effort to increase her heart rate 30 to 60 minutes per day.  She exercises with enough effort to increase her " heart rate 5 days per week.   She is taking medications regularly.      Objective    Vitals - Patient Reported  Weight (Patient Reported): 72.1 kg (159 lb)    Physical Exam   GENERAL: alert and no distress  EYES: Eyes grossly normal to inspection.  No discharge or erythema, or obvious scleral/conjunctival abnormalities.  RESP: No audible wheeze, cough, or visible cyanosis.    SKIN: Visible skin clear. No significant rash, abnormal pigmentation or lesions.  NEURO: Cranial nerves grossly intact.  Mentation and speech appropriate for age.  PSYCH: Appropriate affect, tone, and pace of words      Video-Visit Details    Type of service:  Video Visit   Originating Location (pt. Location): Home    Distant Location (provider location):  On-site  Platform used for Video Visit: Fran  Signed Electronically by: John Holman MD

## 2024-06-13 NOTE — ASSESSMENT & PLAN NOTE
"58 year old year old female in clinic today to discuss treatment of the following conditions through diet and lifestyle modification and weight loss:  1. Overweight with body mass index (BMI) of 29 to 29.9 in adult    2. Prediabetes    3. NAFLD (nonalcoholic fatty liver disease)    4. Metabolic syndrome X      The patient's weight loss result since the last visit was successful based on weight loss maintenance.  Overall she continues to do well. More strength training.  Barriers: consistency of medication coverage, meeting burden with work.  Overall doing well. She has been thoughtful in her approach.  Longterm plan is to ween and get off semaglutide.   \"I want to be stronger.\"     BMI: There is no height or weight on file to calculate BMI.  Ideal body weight: 49.5 kg (109 lb 2.9 oz)  Adjusted ideal body weight: 58.3 kg (128 lb 10.1 oz)    Current therapies:    Medications: YES wegovy at 1.7mg/week.  She has approval through next spring.     - Starting weight for GLP1 receptor agonist: 208 lbs   - Total weight loss: 49 lbs (23.5%)    Nutritional goals/strategies: reviewed.   - caloric restriction: Yes   - time restriction: NO   - diet (macronutrient) framework: high protein/low carbohydrate     Movement:   - walking and more strength    Sleep:    - Restless    Follow-up: 6 months    "

## 2024-08-09 ENCOUNTER — ANCILLARY PROCEDURE (OUTPATIENT)
Dept: MAMMOGRAPHY | Facility: CLINIC | Age: 59
End: 2024-08-09
Attending: FAMILY MEDICINE
Payer: COMMERCIAL

## 2024-08-09 DIAGNOSIS — Z12.31 VISIT FOR SCREENING MAMMOGRAM: ICD-10-CM

## 2024-08-09 PROCEDURE — 77063 BREAST TOMOSYNTHESIS BI: CPT

## 2024-09-22 ENCOUNTER — HEALTH MAINTENANCE LETTER (OUTPATIENT)
Age: 59
End: 2024-09-22

## 2024-10-18 ENCOUNTER — MYC MEDICAL ADVICE (OUTPATIENT)
Dept: FAMILY MEDICINE | Facility: CLINIC | Age: 59
End: 2024-10-18

## 2024-10-18 ENCOUNTER — VIRTUAL VISIT (OUTPATIENT)
Dept: URGENT CARE | Facility: CLINIC | Age: 59
End: 2024-10-18
Payer: COMMERCIAL

## 2024-10-18 ENCOUNTER — NURSE TRIAGE (OUTPATIENT)
Dept: FAMILY MEDICINE | Facility: CLINIC | Age: 59
End: 2024-10-18

## 2024-10-18 DIAGNOSIS — J45.20 MILD INTERMITTENT ASTHMA WITHOUT COMPLICATION: ICD-10-CM

## 2024-10-18 DIAGNOSIS — U07.1 INFECTION DUE TO 2019 NOVEL CORONAVIRUS: Primary | ICD-10-CM

## 2024-10-18 DIAGNOSIS — E66.3 OVERWEIGHT WITH BODY MASS INDEX (BMI) OF 29 TO 29.9 IN ADULT: ICD-10-CM

## 2024-10-18 PROCEDURE — 99213 OFFICE O/P EST LOW 20 MIN: CPT | Mod: 95

## 2024-10-18 RX ORDER — ALBUTEROL SULFATE 90 UG/1
INHALANT RESPIRATORY (INHALATION)
Qty: 8.5 G | Refills: 0 | Status: SHIPPED | OUTPATIENT
Start: 2024-10-18

## 2024-10-18 RX ORDER — MONTELUKAST SODIUM 10 MG/1
1 TABLET ORAL DAILY
Qty: 90 TABLET | Refills: 0 | Status: SHIPPED | OUTPATIENT
Start: 2024-10-18

## 2024-10-18 NOTE — PROGRESS NOTES
"  Jocelyn Quiroga is a 59 year old female who is being evaluated via a billable video visit.      The patient has been notified of following at the time of scheduling video visit:     \"This video visit will be conducted via a video call between you and your physician/provider. We have found that certain health care needs can be provided without the need for a physical exam.  This service lets us provide the care you need with a video conversation.  If a prescription is necessary we can send it directly to your pharmacy.  If lab work is needed we can place an order for that and you can then stop by our lab to have the test done at a later time.\"   Patient has given consent for video visit?  YES    SUBJECTIVE:  Jocelyn Quiroga is an 59 year old female who presents for covid.  Had an exposure five days ago.  Three days ago got the covid vaccine booster and started having some symptoms.  Two days ago sxs worsened and had some mild fever, along with cough, headache and nasal congestion.  Took some tylenol and ibuprofen which helped some.  Tested negative two days ago, but positive with home test this morning and is interested in paxlovid treatment.  Does have hx of asthma.  No hx of renal disease.    PMH:   has a past medical history of Arthritis (01/01/1995), Asthma, GERD (gastroesophageal reflux disease), and Heart murmur (birth).  Patient Active Problem List   Diagnosis    Vitamin D deficiency    Fatigue    Allergic rhinitis    Urticaria    Chronic Allergic Conjunctivitis    Mild intermittent asthma without complication    Allergic rhinitis due to pollen    Allergic Rhinitis Due To Animals    Joint Pain, Localized In The Left Shoulder    Joint Pain, Localized In The Knee    GERD (gastroesophageal reflux disease)    Overweight with body mass index (BMI) of 29 to 29.9 in adult    Prediabetes    NAFLD (nonalcoholic fatty liver disease)    Metabolic syndrome X     Social History     Socioeconomic History    " Marital status:    Tobacco Use    Smoking status: Never     Passive exposure: Never    Smokeless tobacco: Never   Vaping Use    Vaping status: Never Used   Substance and Sexual Activity    Alcohol use: Yes     Alcohol/week: 4.0 standard drinks of alcohol     Comment: Alcoholic Drinks/day: 4-5 times week    Drug use: No    Sexual activity: Yes     Partners: Male     Birth control/protection: Surgical     Social Determinants of Health     Financial Resource Strain: Low Risk  (12/5/2023)    Financial Resource Strain     Within the past 12 months, have you or your family members you live with been unable to get utilities (heat, electricity) when it was really needed?: No   Food Insecurity: Low Risk  (12/5/2023)    Food Insecurity     Within the past 12 months, did you worry that your food would run out before you got money to buy more?: No     Within the past 12 months, did the food you bought just not last and you didn t have money to get more?: No   Transportation Needs: Low Risk  (12/5/2023)    Transportation Needs     Within the past 12 months, has lack of transportation kept you from medical appointments, getting your medicines, non-medical meetings or appointments, work, or from getting things that you need?: No   Housing Stability: Low Risk  (12/5/2023)    Housing Stability     Do you have housing? : Yes     Are you worried about losing your housing?: No     Family History   Problem Relation Age of Onset    Hyperlipidemia Mother     Thyroid Disease Mother         Graves    Hypertension Mother     Macular Degeneration Mother     Diabetes Father     Alzheimer Disease Father     Alcoholism Father     Dementia Father     Vision Loss Father     Frontotemporal dementia Father     Thyroid Disease Maternal Grandmother         Graves    Vision Loss Maternal Grandmother     Hyperlipidemia Maternal Grandmother     Glaucoma Maternal Grandmother     Macular Degeneration Maternal Grandmother     Pancreatic Cancer  Paternal Grandmother     Cancer Paternal Grandmother         pancreas    Cerebrovascular Disease Paternal Grandfather     No Known Problems Brother     Thyroid Disease Sister         hypothyroidism    Allergies Daughter     No Known Problems Daughter     Arthritis Paternal Aunt     Colon Cancer Paternal Aunt     Heart Disease Paternal Uncle     Heart Disease Paternal Uncle     Heart Disease Paternal Uncle     Heart Disease Paternal Uncle     Thyroid Cancer No family hx of        ALLERGIES:  Sulfa (sulfonamide antibiotics) [sulfa antibiotics], Amoxicillin, and Sulfamethoxazole-trimethoprim [sulfamethoxazole-trimethoprim]    Current Outpatient Medications   Medication Sig Dispense Refill    acetaminophen (TYLENOL) 500 MG tablet Take 500-1,000 mg by mouth every 6 hours as needed for mild pain      albuterol (PROAIR HFA/PROVENTIL HFA/VENTOLIN HFA) 108 (90 Base) MCG/ACT inhaler [ALBUTEROL (PROAIR HFA;PROVENTIL HFA;VENTOLIN HFA) 90 MCG/ACTUATION INHALER] TAKE 2 PUFFS BY MOUTH EVERY 4 HOURS AS NEEDED 8.5 g 0    albuterol (PROVENTIL) (2.5 MG/3ML) 0.083% neb solution Take 1 vial (2.5 mg) by nebulization every 6 hours as needed for shortness of breath or wheezing 60 mL 3    cholecalciferol, vitamin D3, (VITAMIN D3) 2,000 unit cap [CHOLECALCIFEROL, VITAMIN D3, (VITAMIN D3) 2,000 UNIT CAP] Take by mouth.      fluocinolone acetonide 0.01 % OIL 2-3 drops to affected ear every other night for itching 17 mL 3    ibuprofen (ADVIL/MOTRIN) 200 MG tablet Take 400 mg by mouth every 4 hours as needed for mild pain      loratadine (CLARITIN) 10 mg tablet [LORATADINE (CLARITIN) 10 MG TABLET] Take by mouth.      metroNIDAZOLE (METROCREAM) 0.75 % external cream APPLY TO AFFECTED AREA 1-2X DAILY ONGOING      montelukast (SINGULAIR) 10 MG tablet Take 1 tablet (10 mg) by mouth daily. 90 tablet 0    olopatadine (PATANOL) 0.1 % ophthalmic solution [OLOPATADINE (PATANOL) 0.1 % OPHTHALMIC SOLUTION] APPLY ONE DROP TO EYE TWICE DAILY 5 mL 1     Semaglutide-Weight Management (WEGOVY) 1.7 MG/0.75ML pen Inject 1.7 mg Subcutaneous once a week 3 mL 5     No current facility-administered medications for this visit.         ROS:  ROS is done and is negative for general/constitutional, eye, ENT, Respiratory, cardiovascular, GI, , Skin, musculoskeletal except as noted elsewhere.  All other review of systems negative except as noted elsewhere.      OBJECTIVE:    No vital signs obtained as is virtual visit    GENERAL: alert and no distress  EYES: Eyes grossly normal to inspection.  No discharge or erythema, or obvious scleral/conjunctival abnormalities.  RESP: Occasional cough.No audible wheeze or visible cyanosis.    SKIN: Visible skin clear. No significant rash, abnormal pigmentation or lesions.  NEURO: Cranial nerves grossly intact.  Mentation and speech appropriate for age.  PSYCH: Appropriate affect, tone, and pace of words           ASSESSMENT/PLAN:    ASSESSMENT / PLAN:  (U07.1) Infection due to 2019 novel coronavirus  (primary encounter diagnosis)  Comment: sxs and positive home test and known exposure.  Has some risk factors and she would like to take paxlovid as has not had covid before  Plan: nirmatrelvir and ritonavir (PAXLOVID) 300         mg/100 mg therapy pack        Reviewed medication instructions and side effects. Follow up if experiences side effects.. I reviewed supportive care, otc meds to use if needed, expected course, and signs of concern.  Follow up as needed or if does not improve within 5 day(s) or if worsens in any way.  Reviewed red flag symptoms and is to go to the ER if experiences any of these.    (J45.20) Mild intermittent asthma without complication  Comment: risk factor for covid complications  Plan: treat covid as above    (E66.3,  Z68.29) Overweight with body mass index (BMI) of 29 to 29.9 in adult  Comment: risk factor for covid complications  Plan: treat covid as above.        See [facility] for orders, medications, letters,  patient instructions    Rebeka Castillo MD  10/18/2024, 2:10 PM    Video-Visit Details    Video Start Time:  2:12    Type of service:  Video Visit    Video End Time:2:32 PM    Originating Location (pt. Location): Home    Distant Location (provider location):  [facility]     Platform used for Video Visit: Chango

## 2024-10-18 NOTE — TELEPHONE ENCOUNTER
"Nurse Triage SBAR    Is this a 2nd Level Triage? YES, LICENSED PRACTITIONER REVIEW IS REQUIRED, but patient does not have a current PCP at this time    Situation:  See [employer]t message into the clinic, on 10/18/24, to report a positive COVID-19 test with current COVID-19 symptoms-see below:    \"Dry cough, congestion, taste is off headache. I did have a low grade fever since Wednesday ( tested negative), but no fever this morning. My first positive test was this morning at 5 am. On a separate note, I am also hoping to get my albuterol inhaler prescription renewed at least a few months until I get a new primary care giver along with the montelukast. Can you help with this too? Viruses are the thing that triggers my asthma ( I have not had any issues with my asthma for a very long time. I do have an inhaler though).\"     Background:   Hx of mild intermittent asthma, allergic rhinitis, GERD, NAFLD, metabolic syndrome, prediabetes, BMI of 28.9, joint pain, chronic allergic conjunctivitis, and urticaria    Assessment:   Patient reports a positive COVID-19 home test today, Friday, 10/18/24, with current COVID-19 symptoms starting on 10/16/24, Wednesday    Current symptoms:  Dry cough, slight SOB, headache, loss of taste (change in taste), sinus congestion    Feels about the same as yesterday-did have COVID-19 exposure on Sunday, 10/13/24    Received COVID-19 vaccine booster on Tuesday, 10/15/24, as well as influenza vaccine    Denies chest pain, significant trouble breathing, fatigue, chills, loss of smell, muscle pain, sore throat, dizziness, lightheadedness, vomiting, diarrhea, nausea, earache, changes to vision, or back or abdominal pain    There does not appear to be any medications on the patient's current medication list that interact with Paxlovid    Protocol Recommended Disposition:   Discuss With PCP And Callback By Nurse Within 1 Hour    Recommendation:   Gave care advice. Recommended that the patient meet with a " virtual provider to discuss Paxlovid treatment for current COVID-19 infection as patient does not have a current PCP at this time.    Patient verbalized understanding and agrees with the plan.    Writer assisted the patient with scheduling an appointment with a virtual provider today, Friday, 10/18/24, at 5:30 pm, to discuss Paxlovid treatment for current COVID-19 infection.    Patient is also requesting refills of her albuterol inhaler, as well as her montelukast, for slight SOB due to COVID-19 infection.    Denies other questions or concerns at this time.    Virtual appt. Today at 5:30 pm    Does the patient meet one of the following criteria for ADS visit consideration? 16+ years old, with an [facility] PCP     TIP  Providers, please consider if this condition is appropriate for management at one of our Acute and Diagnostic Services sites.     If patient is a good candidate, please use dotphrase <dot>triageresponse and select Refer to ADS to document.    Lab Results   Component Value Date    ALT 15 07/21/2023     AST   Date Value Ref Range Status   07/21/2023 20 0 - 45 U/L Final     Comment:     Reference intervals for this test were updated on 6/12/2023 to more accurately reflect our healthy population. There may be differences in the flagging of prior results with similar values performed with this method. Interpretation of those prior results can be made in the context of the updated reference intervals.     Lab Results   Component Value Date    ALKPHOS 78 07/21/2023     Recent Labs   Lab Test 07/21/23  0859 07/06/22  1038   BILITOTAL 0.4 0.3     GFR Estimate   Date Value Ref Range Status   07/21/2023 84 >60 mL/min/1.73m2 Final   07/06/2022 >90 >60 mL/min/1.73m2 Final     Comment:     Effective December 21, 2021 eGFRcr in adults is calculated using the 2021 CKD-EPI creatinine equation which includes age and gender (Isabel et al., NEJM, DOI: 10.1056/QZOSsb3172708)   07/19/2018 >60 >60 mL/min/1.73m2 Final     Reason  "for Disposition   MILD difficulty breathing (e.g., minimal/no SOB at rest, SOB with walking, pulse <100)    Additional Information   Negative: SEVERE difficulty breathing (e.g., struggling for each breath, speaks in single words)   Negative: Difficult to awaken or acting confused (e.g., disoriented, slurred speech)   Negative: Bluish (or gray) lips or face now   Negative: Shock suspected (e.g., cold/pale/clammy skin, too weak to stand, low BP, rapid pulse)   Negative: Sounds like a life-threatening emergency to the triager   Negative: Diagnosed or suspected COVID-19 and symptoms lasting 3 or more weeks   Negative: COVID-19 exposure and no symptoms   Negative: COVID-19 vaccine reaction suspected (e.g., fever, headache, muscle aches) occurring 1 to 3 days after getting vaccine   Negative: COVID-19 vaccine, questions about   Negative: Lives with someone known to have influenza (flu test positive) and flu-like symptoms (e.g., cough, runny nose, sore throat, SOB; with or without fever)   Negative: Possible COVID-19 symptoms and triager concerned about severity of symptoms or other causes   Negative: COVID-19 and breastfeeding, questions about   Negative: SEVERE or constant chest pain or pressure  (Exception: Mild central chest pain, present only when coughing.)   Negative: MODERATE difficulty breathing (e.g., speaks in phrases, SOB even at rest, pulse 100-120)   Negative: Headache and stiff neck (can't touch chin to chest)   Negative: Oxygen level (e.g., pulse oximetry) 90% or lower   Negative: Chest pain or pressure  (Exception: MILD central chest pain, present only when coughing.)   Negative: Drinking very little and dehydration suspected (e.g., no urine > 12 hours, very dry mouth, very lightheaded)   Negative: Patient sounds very sick or weak to the triager    Answer Assessment - Initial Assessment Questions  1. COVID-19 DIAGNOSIS: \"How do you know that you have COVID?\" (e.g., positive lab test or self-test, diagnosed " "by doctor or NP/PA, symptoms after exposure).        Tested positive for COVID-19 at home today, Friday, 10/18/    2. COVID-19 EXPOSURE: \"Was there any known exposure to COVID before the symptoms began?\" CDC Definition of close contact: within 6 feet (2 meters) for a total of 15 minutes or more over a 24-hour period.         Exposure on Sunday, 10/13/24    3. ONSET: \"When did the COVID-19 symptoms start?\"         10/16/24    4. WORST SYMPTOM: \"What is your worst symptom?\" (e.g., cough, fever, shortness of breath, muscle aches)        Cough and slight SOB    5. COUGH: \"Do you have a cough?\" If Yes, ask: \"How bad is the cough?\"          Dry cough    6. FEVER: \"Do you have a fever?\" If Yes, ask: \"What is your temperature, how was it measured, and when did it start?\"        No fever today    7. RESPIRATORY STATUS: \"Describe your breathing?\" (e.g., normal; shortness of breath, wheezing, unable to speak)         Slight SOB and congestion      8. BETTER-SAME-WORSE: \"Are you getting better, staying the same or getting worse compared to yesterday?\"  If getting worse, ask, \"In what way?\"        Same as yesterday    9. OTHER SYMPTOMS: \"Do you have any other symptoms?\"  (e.g., chills, fatigue, headache, loss of smell or taste, muscle pain, sore throat)        Denies chest pain, significant trouble breathing, fatigue, chills, loss of smell, muscle pain, sore throat, dizziness, lightheadedness, vomiting, diarrhea, nausea, earache, changes to vision, back or abdominal pain      Dry cough, headache, loss of taste (change in taste), sinus congestion,     10. HIGH RISK DISEASE: \"Do you have any chronic medical problems?\" (e.g., asthma, heart or lung disease, weak immune system, obesity, etc.)          Asthma, prediabetes, metabolic syndrome, overweight      11. VACCINE: \"Have you had the COVID-19 vaccine?\" If Yes, ask: \"Which one, how many shots, when did you get it?\"          Tuesday, 10/15/24    12. PREGNANCY: \"Is there any chance " "you are pregnant?\" \"When was your last menstrual period?\"          N/A    13. O2 SATURATION MONITOR:  \"Do you use an oxygen saturation monitor (pulse oximeter) at home?\" If Yes, ask \"What is your reading (oxygen level) today?\" \"What is your usual oxygen saturation reading?\" (e.g., 95%)    Protocols used: Coronavirus (COVID-19) Diagnosed or [id number]    Kelsey Quiroga RN, BSN  [facility]    "

## 2025-03-04 NOTE — PROGRESS NOTES
"Date: 10/21/2020 10:12:23  Clinician: Valery Jalloh  Clinician NPI: [id number]  Patient: Jocelyn Quiroga  Patient : 1965  Patient Address: [facility] [place] [id number]  Patient Phone: [phone]  Visit Protocol: URI  Patient Summary:  Jocelyn is a 55 year old ( : 1965 ) female who initiated a [facility] Visit for COVID-19 (Coronavirus) evaluation and screening. When asked the question \"Please sign me up to receive news, health information and promotions from [facility].\", Jocelyn responded \"No\".    Jocelyn states her symptoms started 1-2 days ago.   Her symptoms consist of a headache, a cough, facial pain or pressure, myalgia, chills, malaise, and tooth pain. Jocelyn also feels feverish.   Symptom details     Cough: Jocelyn coughs a few times an hour and her cough is not more bothersome at night. Phlegm does not come into her throat when she coughs. She does not believe her cough is caused by post-nasal drip.     Temperature: Her current temperature is 101.4 degrees Fahrenheit. Jocelyn has had a temperature over 100 degrees Fahrenheit for 1-2 days.     Facial pain or pressure: The facial pain or pressure does not feel worse when bending or leaning forward.     Headache: She states the headache is moderate (4-6 on a 10 point pain scale).     Tooth pain: The tooth pain is not caused by a cavity, recent dental work, or other mouth problems.      Jocelyn denies having ear pain, wheezing, nasal congestion, anosmia, vomiting, rhinitis, nausea, sore throat, ageusia, and diarrhea. She also denies having a sinus infection within the past year and having recent facial or sinus surgery in the past 60 days. She is not experiencing dyspnea.   Precipitating events  She has not recently been exposed to someone with influenza. Jocelyn has been in close contact with the following high risk individuals: adults 65 or older.   Pertinent COVID-19 (Coronavirus) information  In the past 14 days, " AUDIOLOGY REPORT    SUBJECTIVE:  Sa KENIA Mackey is a 65 year old male who was seen in the Audiology Clinic at the [facility] for audiologic evaluation, referred by Jason Siddiqui M.D.  The patient has been seen previously in this clinic on 12/28/2022 for assessment and results indicated mild sloping to severe mostly sensorineural hearing loss in the right ear (air/bone gap at 250 Hz and masking dilemma at 1000 Hz) and mild to severe rising to mild and sloping back to severe mixed hearing loss in the left ear.  He wears binaural [employer] eYekaeo P70 hearing aids, but states he hasn't been able to wear them in a few months due to itching in his ears.  He reports aural fullness in both ears for the past few months.  He reports frequent tinnitus in both ears.  He has some ear pain when trying to clean his ears with Q-tips and some drainage from both ears for the past month.  He denies dizziness, past ear surgery, family history of hearing loss, and noise exposure. Yes: Language: Liam, ID Number/Identifier: [id number] .    OBJECTIVE:  Abuse Screening:  Do you feel unsafe at home or work/school? No  Do you feel threatened by someone? No  Does anyone try to keep you from having contact with others, or doing things outside of your home? No  Physical signs of abuse present? No     Fall Risk Screen:  1. Have you fallen two or more times in the past year? No  2. Have you fallen and had an injury in the past year? No    Timed Up and Go Score (in seconds): not tested  Is patient a fall risk? No  Referral initiated: No  Fall Risk Assessment Completed by Audiology    Otoscopic exam indicates ears are clear of cerumen bilaterally     Pure Tone Thresholds assessed using conventional audiometry with good  reliability from 250-8000 Hz bilaterally using insert earphones and circumaural headphones     RIGHT:  normal sloping to moderately-severe sensorineural hearing loss    LEFT:  borderline normal sloping to  Jocelyn has not worked in a congregate living setting.   She does not work or volunteer as healthcare worker or a  and does not work or volunteer in a healthcare facility.   Jocelyn also has not lived in a congregate living setting in the past 14 days. She does not live with a healthcare worker.   Jocelyn has not had a close contact with a laboratory-confirmed COVID-19 patient within 14 days of symptom onset.   Since December 2019, Jocelyn and has had upper respiratory infection (URI) or influenza-like illness. Has not been diagnosed with lab-confirmed COVID-19 test      Date(s) of previous URI or influenza-like illness (free-text): Started yesterday - oct 20.     Symptoms Jocelyn experienced during previous URI or influenza-like illness as reported by the patient (free-text): Headache, body aches, chills fever some minor coughing        Pertinent medical history  Jocelyn has taken an antibiotic medication in the past month. Antibiotic details as reported by the patient (free text): Nitrofurantoin for a UTI   Jocelyn does not get yeast infections when she takes antibiotics.   Jocelyn does not need a return to work/school note.   Weight: 200 lbs   Jocelyn does not smoke or use smokeless tobacco.   Additional information as reported by the patient (free text): I am an asthmatic and my asthma is very well controlled unless I get a respiratory infection.  I have a finger oxygen  and this morning it was at 92.  I feel okay right now but I have used my oral inhaler.  I just realized I forgot to add that to my medication list - albuterol inhaler.   Weight: 200 lbs    MEDICATIONS: Claritin oral, cholecalciferol (vitamin D3) oral, Singulair oral, ALLERGIES: Sulfa (Sulfonamide Antibiotics)  Clinician Response:  Dear Jocelyn,  I am sorry you are not feeling well. To determine the most appropriate care for you, I would like you to be seen in person to further discuss your health history and  moderately-severe sensorineural hearing loss    Tympanogram:    RIGHT: Shallow eardrum mobility    LEFT:   flat tracing with normal ear canal volume     Reflexes (reported by stimulus ear):   Did not test due to flat tympanogram left ear.       Speech Detection Threshold:    RIGHT: 15 dB HL    LEFT:   30 dB HL    Word Recognition Score:     RIGHT: 100% at 60 dB HL via monitored live voice with     LEFT:   100% at 70 dB HL via monitored live voice with       ASSESSMENT:     Audiogram showed normal sloping to moderately-severe sensorineural hearing loss in the right ear and borderline normal sloping to moderately-severe sensorineural hearing loss in the left ear.  Compared to patient's previous audiogram dated 12/28/2022, hearing has remained stable in the right ear and has improved at some frequencies in the left ear. Today s results were discussed with the patient in detail.     PLAN:  It is recommended that the patient return to see ENT due to itching in the ears.  Reviewed how to change domes and wax traps for his hearing aids today. He is advised to continue use of hearing aids if able.  Please call this clinic with questions regarding these results or recommendations.      Nasrin Alberts, CCC-A  [place] Licensed Audiologist #[id number]          symptoms.  You will not be charged for this [facility] Visit. Thank you for trusting us with your care.  COVID-19 (Coronavirus) General Information  Because there is currently no vaccine to prevent infection, the best way to protect yourself is to avoid being exposed to this virus. Common symptoms of COVID-19 include but are not limited to fever, cough, and shortness of breath. These symptoms appear 2-14 days after you are exposed to the virus that causes COVID-19. Click here for more information from the CDC on how to protect yourself.  If you are sick with COVID-19 or suspect you are infected with the virus that causes COVID-19, follow the steps here from the CDC to help prevent the disease from spreading to people in your home and community.  Click here for general information from the CDC on testing.  If you develop any of these emergency warning signs for COVID-19, get medical attention immediately:     Trouble breathing    Persistent pain or pressure in the chest    New confusion or inability to arouse    Bluish lips or face      Call your doctor or clinic before going in. Call 911 if you have a medical emergency and notify the  you have or think you may have COVID-19.  For more detailed and up to date information on COVID-19 (Coronavirus), please visit the CDC website.   Diagnosis: Refer for additional evaluation  Diagnosis ICD: R69

## 2025-03-10 DIAGNOSIS — J45.20 MILD INTERMITTENT ASTHMA WITHOUT COMPLICATION: ICD-10-CM

## 2025-03-10 NOTE — TELEPHONE ENCOUNTER
Medication Question or Refill        What medication are you calling about (include dose and sig)?:    montelukast (SINGULAIR)        Preferred Pharmacy:   [facility] [id number] [place] [facility] - [address] [address]  Phone: [phone] Fax: [phone]      Controlled Substance Agreement on file:   CSA -- Patient Level:    CSA: None found at the patient level.       Who prescribed the medication?: natalee sibley    Do you need a refill? Yes    When did you use the medication last? ongoing    Patient offered an appointment? Yes: scheduled 4/28  with Rebeka Bruno - needs meds till then    Do you have any questions or concerns?  No      Could we send this information to you in [facility] or would you prefer to receive a phone call?:   Patient would prefer a phone call   Okay to leave a detailed message?: Yes at Home number on file [phone]

## 2025-03-11 RX ORDER — MONTELUKAST SODIUM 10 MG/1
1 TABLET ORAL DAILY
Qty: 90 TABLET | Refills: 0 | Status: SHIPPED | OUTPATIENT
Start: 2025-03-11

## 2025-04-17 ENCOUNTER — TRANSFERRED RECORDS (OUTPATIENT)
Dept: ADMINISTRATIVE | Facility: CLINIC | Age: 60
End: 2025-04-17
Payer: COMMERCIAL

## 2025-04-22 ENCOUNTER — TRANSFERRED RECORDS (OUTPATIENT)
Dept: HEALTH INFORMATION MANAGEMENT | Facility: CLINIC | Age: 60
End: 2025-04-22

## 2025-04-22 PROBLEM — D12.6 COLON ADENOMAS: Status: ACTIVE | Noted: 2025-04-22

## 2025-04-22 NOTE — PROCEDURES
[facility]   [address] [place] [address], [address], [place] [address]     Patient Name: Jocelyn Quiroga  Gender:  Female  Exam Date: 04/17/2025 Visit Number:  [id number]  Age: 59 Years YOB: 1965  Attending MD: Keith Contreras MD Medical Record#:  [id number]    Procedure: Colonoscopy   Indications: Previous adenomatous polyp(s)       Confirm complete removal of large cecal polyp      Referring MD: Referral Self  Primary MD:      MD No Primary  Medications: Admitting Medications:   0.9% Normal Saline at TKO   Intra Procedure Medications:   Patient received monitored anesthesia care.     Complications: No immediate complications  ______________________________________________________________________________  Procedure:   An examination of the heart and lungs was performed and found to be within acceptable limits.  .  The patient was therefore deemed a reasonable candidate for endoscopy and sedation.   The risks and benefits of the procedure were explained to the patient.After obtaining informed consent, the patient received monitored anesthesia care and I passed the scope   without difficulty via the rectum to the cecum.  The appendiceal orifice and ic valve were identified.  The scope was retroflexed during the examination  The quality of the prep was good  (Miralax/Gatorade/2 tablets Bisacodyl/Magnesium Citrate).    This was a complete examination throughout the entire colon.    Findings:    Polyp location: cecum.  Quantity: 4.  Size:  2 mm, 4 mm, 5 mm, 6 mm.  Polyp shape:  sessile.         Maneuver: polypectomy was performed with a cold snare and cold biopsy forceps.       Removal:  complete.  Retrieval: complete.  Bleeding: none.    Diverticulosis.  Location: - transverse colon - descending colon - sigmoid.    Description:  mild.    Hemorrhoids.  External hemorrhoids without bleeding.  Remainder of the exam is normal.    Impression:  Colorectal polyps  Personal history  of colonic polyps  Diverticulosis of colon    Preliminary Plan:  Repeat colonoscopy in 1 year  Pathology Results:  A: COLON, CECUM, POLYPS:           1. Tubular adenoma (1) and sessile serrated adenomas (3, larger polyps)               a. Negative for high grade dysplasia in tubular adenoma component               b. Negative for overt dysplasia in sessile serrated adenoma component           2. Per the colonoscopy report:               a. Polyp sizes: 2 mm, 4 mm, 5 mm and 6 mm               b. Resection: Complete               c. Retrieval: Complete      COMMENTS  A. Given the number and size of serrated polyps present and documented in previous colonoscopies, this patient meets clinical criteria for serrated polyposis (see criteria below).     Per the 2019 World Health Organization (WHO) Classification of Tumors of the Digestive System, clinical criteria for the diagnosis of serrated polyposis include:   -- Criterion 1: At least five serrated lesions/polyps proximal to the rectum (all at least 5 mm in size) with at least two being greater or equal to 10 mm in size.   --Criterion 2: Greater than twenty serrated lesions/polyps of any size distributed throughout the large bowel with at least 5 being proximal to the rectum.      MICROSCOPIC  A: Performed     Electronically signed by: Antoinette Angel DO    Interpreted at [facility], [address] [place] [id number]    Orders    Instruction(s)/Education:  Instruction/Education Timeframe Assessment   Colon Cancer Prevention  K63.5   Colon Polyps  K63.5   Diverticulosis/Diverticulitis  K63.5   Hemorrhoids (External)  K63.5   High Fiber Diet  K63.5       Final Plan:  Repeat colonoscopy in 1 year.    We will attempt to contact you at appropriate intervals via U.S. mail.  We may not be able to find you or contact you at that time, therefore you should know that the responsibility for following our recommendation rests with you.  If you  don't hear from us at the time your procedure is due, please contact our office to schedule an appointment.  If your contact information should change, please contact our office so that we can update your record.  Additional Comments:  Based on the size and type and number of polyps that have been removed from your colon since 2012, you appear to have serrated polyposis syndrome.  People with this syndrome should have colonoscopies at least every 1 to 3 years.  Please repeat your next colonoscopy in 1 year.      _Electronically signed by:___________________  Keith Contreras MD                 04/17/2025    cc: MD No Primary

## 2025-06-09 SDOH — HEALTH STABILITY: PHYSICAL HEALTH: ON AVERAGE, HOW MANY MINUTES DO YOU ENGAGE IN EXERCISE AT THIS LEVEL?: 50 MIN

## 2025-06-09 SDOH — HEALTH STABILITY: PHYSICAL HEALTH: ON AVERAGE, HOW MANY DAYS PER WEEK DO YOU ENGAGE IN MODERATE TO STRENUOUS EXERCISE (LIKE A BRISK WALK)?: 6 DAYS

## 2025-06-09 ASSESSMENT — SOCIAL DETERMINANTS OF HEALTH (SDOH): HOW OFTEN DO YOU GET TOGETHER WITH FRIENDS OR RELATIVES?: TWICE A WEEK

## 2025-06-12 ENCOUNTER — OFFICE VISIT (OUTPATIENT)
Dept: FAMILY MEDICINE | Facility: CLINIC | Age: 60
End: 2025-06-12
Payer: COMMERCIAL

## 2025-06-12 VITALS
WEIGHT: 162.6 LBS | OXYGEN SATURATION: 98 % | DIASTOLIC BLOOD PRESSURE: 69 MMHG | RESPIRATION RATE: 16 BRPM | HEART RATE: 74 BPM | BODY MASS INDEX: 29.92 KG/M2 | TEMPERATURE: 98.2 F | SYSTOLIC BLOOD PRESSURE: 106 MMHG | HEIGHT: 62 IN

## 2025-06-12 DIAGNOSIS — L29.9 EAR ITCH: ICD-10-CM

## 2025-06-12 DIAGNOSIS — R73.03 PREDIABETES: ICD-10-CM

## 2025-06-12 DIAGNOSIS — E78.00 HYPERCHOLESTEREMIA: ICD-10-CM

## 2025-06-12 DIAGNOSIS — Z00.00 ROUTINE GENERAL MEDICAL EXAMINATION AT A HEALTH CARE FACILITY: Primary | ICD-10-CM

## 2025-06-12 DIAGNOSIS — J45.20 MILD INTERMITTENT ASTHMA WITHOUT COMPLICATION: ICD-10-CM

## 2025-06-12 DIAGNOSIS — M25.50 MULTIPLE JOINT PAIN: ICD-10-CM

## 2025-06-12 DIAGNOSIS — E66.3 OVERWEIGHT WITH BODY MASS INDEX (BMI) OF 29 TO 29.9 IN ADULT: ICD-10-CM

## 2025-06-12 DIAGNOSIS — K76.0 NAFLD (NONALCOHOLIC FATTY LIVER DISEASE): ICD-10-CM

## 2025-06-12 LAB
ALBUMIN SERPL BCG-MCNC: 4.6 G/DL (ref 3.5–5.2)
ALP SERPL-CCNC: 76 U/L (ref 40–150)
ALT SERPL W P-5'-P-CCNC: 15 U/L (ref 0–50)
ANION GAP SERPL CALCULATED.3IONS-SCNC: 11 MMOL/L (ref 7–15)
AST SERPL W P-5'-P-CCNC: 20 U/L (ref 0–45)
BILIRUB SERPL-MCNC: 0.4 MG/DL
BUN SERPL-MCNC: 20 MG/DL (ref 6–20)
CALCIUM SERPL-MCNC: 9.5 MG/DL (ref 8.8–10.4)
CHLORIDE SERPL-SCNC: 102 MMOL/L (ref 98–107)
CHOLEST SERPL-MCNC: 231 MG/DL
CREAT SERPL-MCNC: 0.8 MG/DL (ref 0.51–0.95)
CRP SERPL-MCNC: <3 MG/L
EGFRCR SERPLBLD CKD-EPI 2021: 85 ML/MIN/1.73M2
ERYTHROCYTE [DISTWIDTH] IN BLOOD BY AUTOMATED COUNT: 12.4 % (ref 10–15)
ERYTHROCYTE [SEDIMENTATION RATE] IN BLOOD BY WESTERGREN METHOD: 7 MM/HR (ref 0–30)
EST. AVERAGE GLUCOSE BLD GHB EST-MCNC: 103 MG/DL
FASTING STATUS PATIENT QL REPORTED: YES
FASTING STATUS PATIENT QL REPORTED: YES
GLUCOSE SERPL-MCNC: 80 MG/DL (ref 70–99)
HBA1C MFR BLD: 5.2 % (ref 0–5.6)
HCO3 SERPL-SCNC: 24 MMOL/L (ref 22–29)
HCT VFR BLD AUTO: 40.2 % (ref 35–47)
HDLC SERPL-MCNC: 85 MG/DL
HGB BLD-MCNC: 13.6 G/DL (ref 11.7–15.7)
LDLC SERPL CALC-MCNC: 133 MG/DL
MCH RBC QN AUTO: 30.6 PG (ref 26.5–33)
MCHC RBC AUTO-ENTMCNC: 33.8 G/DL (ref 31.5–36.5)
MCV RBC AUTO: 90 FL (ref 78–100)
NONHDLC SERPL-MCNC: 146 MG/DL
PLATELET # BLD AUTO: 286 10E3/UL (ref 150–450)
POTASSIUM SERPL-SCNC: 4.3 MMOL/L (ref 3.4–5.3)
PROT SERPL-MCNC: 7.3 G/DL (ref 6.4–8.3)
RBC # BLD AUTO: 4.45 10E6/UL (ref 3.8–5.2)
RHEUMATOID FACT SERPL-ACNC: <10 IU/ML
SODIUM SERPL-SCNC: 137 MMOL/L (ref 135–145)
TRIGL SERPL-MCNC: 65 MG/DL
URATE SERPL-MCNC: 5.1 MG/DL (ref 2.4–5.7)
WBC # BLD AUTO: 5.2 10E3/UL (ref 4–11)

## 2025-06-12 RX ORDER — FLUOCINOLONE ACETONIDE 0.11 MG/ML
OIL AURICULAR (OTIC)
Qty: 17 ML | Refills: 3 | Status: SHIPPED | OUTPATIENT
Start: 2025-06-12

## 2025-06-12 RX ORDER — ALBUTEROL SULFATE 90 UG/1
INHALANT RESPIRATORY (INHALATION)
Qty: 8.5 G | Refills: 0 | Status: SHIPPED | OUTPATIENT
Start: 2025-06-12

## 2025-06-12 RX ORDER — MONTELUKAST SODIUM 10 MG/1
1 TABLET ORAL DAILY
Qty: 90 TABLET | Refills: 3 | Status: SHIPPED | OUTPATIENT
Start: 2025-06-12

## 2025-06-12 RX ORDER — MONTELUKAST SODIUM 10 MG/1
1 TABLET ORAL DAILY
Qty: 90 TABLET | Refills: 0 | Status: SHIPPED | OUTPATIENT
Start: 2025-06-12 | End: 2025-06-12

## 2025-06-12 RX ORDER — ASPIRIN 325 MG
325 TABLET ORAL DAILY
COMMUNITY
Start: 2025-04-17

## 2025-06-12 RX ORDER — ALBUTEROL SULFATE 0.83 MG/ML
2.5 SOLUTION RESPIRATORY (INHALATION) EVERY 6 HOURS PRN
Qty: 60 ML | Refills: 3 | Status: SHIPPED | OUTPATIENT
Start: 2025-06-12

## 2025-06-12 NOTE — PROGRESS NOTES
Prior to immunization administration, verified patients identity using patient s name and date of birth. Please see Immunization Activity for additional information.     Screening Questionnaire for Adult Immunization    Are you sick today?   No   Do you have allergies to medications, food, a vaccine component or latex?   No   Have you ever had a serious reaction after receiving a vaccination?   Yes   Do you have a long-term health problem with heart, lung, kidney, or metabolic disease (e.g., diabetes), asthma, a blood disorder, no spleen, complement component deficiency, a cochlear implant, or a spinal fluid leak?  Are you on long-term aspirin therapy?   Yes   Do you have cancer, leukemia, HIV/AIDS, or any other immune system problem?   No   Do you have a parent, brother, or sister with an immune system problem?   No   In the past 3 months, have you taken medications that affect  your immune system, such as prednisone, other steroids, or anticancer drugs; drugs for the treatment of rheumatoid arthritis, Crohn s disease, or psoriasis; or have you had radiation treatments?   No   Have you had a seizure, or a brain or other nervous system problem?   No   During the past year, have you received a transfusion of blood or blood    products, or been given immune (gamma) globulin or antiviral drug?   No   For women: Are you pregnant or is there a chance you could become       pregnant during the next month?   No   Have you received any vaccinations in the past 4 weeks?   No     Immunization questionnaire was positive for at least one answer.  Notified md.      Patient instructed to remain in clinic for 15 minutes afterwards, and to report any adverse reactions.     Screening performed by Riya Jenkins MA on 6/12/2025 at 2:24 PM.

## 2025-06-12 NOTE — PATIENT INSTRUCTIONS
Patient Education   Preventive Care Advice   This is general advice given by our system to help you stay healthy. However, your care team may have specific advice just for you. Please talk to your care team about your preventive care needs.  Nutrition  Eat 5 or more servings of fruits and vegetables each day.  Try wheat bread, brown rice and whole grain pasta (instead of white bread, rice, and pasta).  Get enough calcium and vitamin D. Check the label on foods and aim for 100% of the RDA (recommended daily allowance).  Lifestyle  Exercise at least 150 minutes each week  (30 minutes a day, 5 days a week).  Do muscle strengthening activities 2 days a week. These help control your weight and prevent disease.  No smoking.  Wear sunscreen to prevent skin cancer.  Have a dental exam and cleaning every 6 months.  Yearly exams  See your health care team every year to talk about:  Any changes in your health.  Any medicines your care team has prescribed.  Preventive care, family planning, and ways to prevent chronic diseases.  Shots (vaccines)   HPV shots (up to age 26), if you've never had them before.  Hepatitis B shots (up to age 59), if you've never had them before.  COVID-19 shot: Get this shot when it's due.  Flu shot: Get a flu shot every year.  Tetanus shot: Get a tetanus shot every 10 years.  Pneumococcal, hepatitis A, and RSV shots: Ask your care team if you need these based on your risk.  Shingles shot (for age 50 and up)  General health tests  Diabetes screening:  Starting at age 35, Get screened for diabetes at least every 3 years.  If you are younger than age 35, ask your care team if you should be screened for diabetes.  Cholesterol test: At age 39, start having a cholesterol test every 5 years, or more often if advised.  Bone density scan (DEXA): At age 50, ask your care team if you should have this scan for osteoporosis (brittle bones).  Hepatitis C: Get tested at least once in your life.  STIs (sexually  transmitted infections)  Before age 24: Ask your care team if you should be screened for STIs.  After age 24: Get screened for STIs if you're at risk. You are at risk for STIs (including HIV) if:  You are sexually active with more than one person.  You don't use condoms every time.  You or a partner was diagnosed with a sexually transmitted infection.  If you are at risk for HIV, ask about PrEP medicine to prevent HIV.  Get tested for HIV at least once in your life, whether you are at risk for HIV or not.  Cancer screening tests  Cervical cancer screening: If you have a cervix, begin getting regular cervical cancer screening tests starting at age 21.  Breast cancer scan (mammogram): If you've ever had breasts, begin having regular mammograms starting at age 40. This is a scan to check for breast cancer.  Colon cancer screening: It is important to start screening for colon cancer at age 45.  Have a colonoscopy test every 10 years (or more often if you're at risk) Or, ask your provider about stool tests like a FIT test every year or Cologuard test every 3 years.  To learn more about your testing options, visit:   .  For help making a decision, visit:   https://bit.ly/cg28981.  Prostate cancer screening test: If you have a prostate, ask your care team if a prostate cancer screening test (PSA) at age 55 is right for you.  Lung cancer screening: If you are a current or former smoker ages 50 to 80, ask your care team if ongoing lung cancer screenings are right for you.  For informational purposes only. Not to replace the advice of your health care provider. Copyright   2023 [facility]. All rights reserved. Clinically reviewed by the [facility]. [employer] 330479 - REV 01/24.  Learning About Stress  What is stress?     Stress is your body's response to a hard situation. Your body can have a physical, emotional, or mental response. Stress is a fact of life for most people, and it  affects everyone differently. What causes stress for you may not be stressful for someone else.  A lot of things can cause stress. You may feel stress when you go on a job interview, take a test, or run a race. This kind of short-term stress is normal and even useful. It can help you if you need to work hard or react quickly. For example, stress can help you finish an important job on time.  Long-term stress is caused by ongoing stressful situations or events. Examples of long-term stress include long-term health problems, ongoing problems at work, or conflicts in your family. Long-term stress can harm your health.  How does stress affect your health?  When you are stressed, your body responds as though you are in danger. It makes hormones that speed up your heart, make you breathe faster, and give you a burst of energy. This is called the fight-or-flight stress response. If the stress is over quickly, your body goes back to normal and no harm is done.  But if stress happens too often or lasts too long, it can have bad effects. Long-term stress can make you more likely to get sick, and it can make symptoms of some diseases worse. If you tense up when you are stressed, you may develop neck, shoulder, or low back pain. Stress is linked to high blood pressure and heart disease.  Stress also harms your emotional health. It can make you rodriguez, tense, or depressed. Your relationships may suffer, and you may not do well at work or school.  What can you do to manage stress?  You can try these things to help manage stress:   Do something active. Exercise or activity can help reduce stress. Walking is a great way to get started. Even everyday activities such as housecleaning or yard work can help.  Try yoga or jailyn chi. These techniques combine exercise and meditation. You may need some training at first to learn them.  Do something you enjoy. For example, listen to music or go to a movie. Practice your hobby or do volunteer  "work.  Meditate. This can help you relax, because you are not worrying about what happened before or what may happen in the future.  Do guided imagery. Imagine yourself in any setting that helps you feel calm. You can use online videos, books, or a teacher to guide you.  Do breathing exercises. For example:  From a standing position, bend forward from the waist with your knees slightly bent. Let your arms dangle close to the floor.  Breathe in slowly and deeply as you return to a standing position. Roll up slowly and lift your head last.  Hold your breath for just a few seconds in the standing position.  Breathe out slowly and bend forward from the waist.  Let your feelings out. Talk, laugh, cry, and express anger when you need to. Talking with supportive friends or family, a counselor, or a troy leader about your feelings is a healthy way to relieve stress. Avoid discussing your feelings with people who make you feel worse.  Write. It may help to write about things that are bothering you. This helps you find out how much stress you feel and what is causing it. When you know this, you can find better ways to cope.  What can you do to prevent stress?  You might try some of these things to help prevent stress:  Manage your time. This helps you find time to do the things you want and need to do.  Get enough sleep. Your body recovers from the stresses of the day while you are sleeping.  Get support. Your family, friends, and community can make a difference in how you experience stress.  Limit your news feed. Avoid or limit time on social media or news that may make you feel stressed.  Do something active. Exercise or activity can help reduce stress. Walking is a great way to get started.  Where can you learn more?  Go to https://www.[employer].net/patiented  Enter N032 in the search box to learn more about \"Learning About Stress.\"  Current as of: October 24, 2024  Content Version: 14.5 2024-2025 [employer].   Care instructions adapted under license by your healthcare professional. If you have questions about a medical condition or this instruction, always ask your healthcare professional. [employer] disclaims any warranty or liability for your use of this information.

## 2025-06-12 NOTE — PROGRESS NOTES
Preventive Care Visit  [facility]  Rebeka Bruno MD, Family Medicine  Jun 12, 2025      1. Routine general medical examination at a health care facility ([facility])  Elis is in to establish care and her annual exam.  As far as healthcare maintenance, her next Pap smear is due in 2027.  She is due for mammogram in August.  She had a colonoscopy this year and they want to see her back in a year due to a serrated polyp.  Will update her Prevnar 20 today.    2. Mild intermittent asthma without complication  She states her asthma has been under really good control this year.  Her main triggers are viruses.  Refills on her medication are sent in.  - montelukast (SINGULAIR) 10 MG tablet; Take 1 tablet (10 mg) by mouth daily.  Dispense: 90 tablet; Refill: 3  - albuterol (PROAIR HFA/PROVENTIL HFA/VENTOLIN HFA) 108 (90 Base) MCG/ACT inhaler; [ALBUTEROL (PROAIR HFA;PROVENTIL HFA;VENTOLIN HFA) 90 MCG/ACTUATION INHALER] TAKE 2 PUFFS BY MOUTH EVERY 4 HOURS AS NEEDED  Dispense: 8.5 g; Refill: 0  - albuterol (PROVENTIL) (2.5 MG/3ML) 0.083% neb solution; Take 1 vial (2.5 mg) by nebulization every 6 hours as needed for shortness of breath or wheezing.  Dispense: 60 mL; Refill: 3    3. Overweight with body mass index (BMI) of 29 to 29.9 in adult  She has been following with Dr. Holman at the [facility] and lost quite a bit of weight with Wegovy.  She has been off it now for a couple of months and has had a little bit of weight regain.  She is trying to work on her carbs and proteins along with some strength training.  She has a follow-up with him next week and then she potentially will transfer to me as this is closer to home.  I did discuss with her typically you there is weight regain with stopping GLP-1's.  She is not sure she wants to restart it she would have to cash pay but we could discuss other medication options for maintenance.    4. Ear itch  She was given a prescription of these eardrops  in the past for dermatitis.  Refills are sent.  - fluocinolone acetonide oil 0.01 % ear drops; 2-3 drops to affected ear every other night for itching  Dispense: 17 mL; Refill: 3    5. NAFLD (nonalcoholic fatty liver disease)  Will recheck labs today.  - CBC with platelets; Future  - Comprehensive metabolic panel; Future    6. Prediabetes  Recheck labs.  - Hemoglobin A1c; Future    7. Hypercholesteremia  She has had mildly elevated cholesterol in the past.  Will recheck today.  - Lipid panel reflex to direct LDL Fasting; Future    8. Multiple joint pain  She is having a lot of joint pain in her hands, wrist, and elbows.  This is most likely osteoarthritis but we will do some labs to rule out rheumatoid as she does have some family history.  We discussed conservative measures like Voltaren gel that might be helpful.  - TSH with free T4 reflex; Future  - Vitamin D Deficiency; Future  - CRP inflammation; Future  - Cyclic Citrullinated Peptide Antibody IgG; Future  - Erythrocyte sedimentation rate auto; Future  - Anti Nuclear Luda IgG by IFA with Reflex; Future  - Rheumatoid factor; Future  - Uric acid; Future      Subjective   Elis is a 59 year old, presenting for the following:  Physical        6/12/2025     1:40 PM   Additional Questions   Roomed by           HPI  Elis comes in today as a new patient to establish care and to have her annual physical.  Her main concern today is some joint pain.  It is mainly the joints in her hands, wrists, and elbows.  She never sees any redness, swelling, or warmth.  Its mainly in the morning and then when she gets going it feels a little bit better.  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  As far as family history, she has a paternal aunt with rheumatoid arthritis but both her mother and grandmother had osteoarthritis.  She has been following comprehensive weight management at the [facility].  She did quite well with Wegovy  and now has taken herself off of it and has had a little bit of weight regain.  I did discuss with her that I do weight loss here and she could transfer to me if it is more convenient for her.  She states her asthma is been under very good control with her current medications.  Viruses seem to be her main trigger.    The longitudinal plan of care for the diagnosis(es)/condition(s) as documented were addressed during this visit. Due to the added complexity in care, I will continue to support Elis in the subsequent management and with ongoing continuity of care.         Advance Care Planning    Discussed advance care planning with patient; informed AVS has link to [facility].        6/9/2025   General Health   How would you rate your overall physical health? Good   Feel stress (tense, anxious, or unable to sleep) Rather much   (!) STRESS CONCERN      6/9/2025   Nutrition   Three or more servings of calcium each day? Yes   Diet: Carbohydrate counting   How many servings of fruit and vegetables per day? 4 or more   How many sweetened beverages each day? 0-1         6/9/2025   Exercise   Days per week of moderate/strenous exercise 6 days   Average minutes spent exercising at this level 50 min         6/9/2025   Social Factors   Frequency of gathering with friends or relatives Twice a week   Worry food won't last until get money to buy more No   Food not last or not have enough money for food? No   Do you have housing? (Housing is defined as stable permanent housing and does not include staying outside in a car, in a tent, in an abandoned building, in an overnight shelter, or couch-surfing.) Yes   Are you worried about losing your housing? No   Lack of transportation? No   Unable to get utilities (heat,electricity)? No         6/9/2025   Fall Risk   Fallen 2 or more times in the past year? No   Trouble with walking or balance? No          6/9/2025   Dental   Dentist two times every year? Yes           Today's PHQ-2  pseudomonas bacteremia Score:       1/9/2025     1:48 PM   PHQ-2 ( 1999 [employer])   Q1: Little interest or pleasure in doing things 0   Q2: Feeling down, depressed or hopeless 0   PHQ-2 Score 0    Q1: Little interest or pleasure in doing things Not at all   Q2: Feeling down, depressed or hopeless Not at all   PHQ-2 Score 0       Patient-reported         6/9/2025   Substance Use   Alcohol more than 3/day or more than 7/wk No   Do you use any other substances recreationally? No     Social History     Tobacco Use    Smoking status: Never     Passive exposure: Never    Smokeless tobacco: Never   Vaping Use    Vaping status: Never Used   Substance Use Topics    Alcohol use: Yes     Alcohol/week: 4.0 standard drinks of alcohol     Comment: Alcoholic Drinks/day: 4-5 times week    Drug use: No           8/9/2024   LAST FHS-7 RESULTS   1st degree relative breast or ovarian cancer No   Any relative bilateral breast cancer No   Any male have breast cancer No   Any ONE woman have BOTH breast AND ovarian cancer No   Any woman with breast cancer before 50yrs No   2 or more relatives with breast AND/OR ovarian cancer No   2 or more relatives with breast AND/OR bowel cancer No        Mammogram Screening - Mammogram every 1-2 years updated in Health Maintenance based on mutual decision making        6/9/2025   STI Screening   New sexual partner(s) since last STI/HIV test? No     History of abnormal Pap smear: No - age 30- 64 PAP with HPV every 5 years recommended        Latest Ref Rng & Units 7/6/2022    10:31 AM 8/5/2015     8:01 AM   PAP / HPV   PAP  Negative for Intraepithelial Lesion or Malignancy (NILM)  Negative for squamous intraepithelial lesion or malignancy  Electronically signed by Isaura Millan CT (ASCP) on 8/14/2015 at 11:33 AM      HPV 16 DNA Negative Negative     HPV 18 DNA Negative Negative     Other HR HPV Negative Negative       ASCVD Risk   The 10-year ASCVD risk score (Aileen DK, et al., 2019) is: 1.3%    Values used to  "calculate the score:      Age: 59 years      Sex: Female      Is Non- : No      Diabetic: No      Tobacco smoker: No      Systolic Blood Pressure: 106 mmHg      Is BP treated: No      HDL Cholesterol: 80 mg/dL      Total Cholesterol: 214 mg/dL           Reviewed and updated as needed this visit by Provider                    Lab work is in process  Current Outpatient Medications   Medication Sig Dispense Refill    acetaminophen (TYLENOL) 500 MG tablet Take 500-1,000 mg by mouth every 6 hours as needed for mild pain      albuterol (PROAIR HFA/PROVENTIL HFA/VENTOLIN HFA) 108 (90 Base) MCG/ACT inhaler [ALBUTEROL (PROAIR HFA;PROVENTIL HFA;VENTOLIN HFA) 90 MCG/ACTUATION INHALER] TAKE 2 PUFFS BY MOUTH EVERY 4 HOURS AS NEEDED 8.5 g 0    albuterol (PROVENTIL) (2.5 MG/3ML) 0.083% neb solution Take 1 vial (2.5 mg) by nebulization every 6 hours as needed for shortness of breath or wheezing. 60 mL 3    aspirin (ASA) 325 MG tablet Take 325 mg by mouth daily.      cholecalciferol, vitamin D3, (VITAMIN D3) 2,000 unit cap [CHOLECALCIFEROL, VITAMIN D3, (VITAMIN D3) 2,000 UNIT CAP] Take by mouth.      fluocinolone acetonide oil 0.01 % ear drops 2-3 drops to affected ear every other night for itching 17 mL 3    ibuprofen (ADVIL/MOTRIN) 200 MG tablet Take 400 mg by mouth every 4 hours as needed for mild pain      loratadine (CLARITIN) 10 mg tablet [LORATADINE (CLARITIN) 10 MG TABLET] Take by mouth.      montelukast (SINGULAIR) 10 MG tablet Take 1 tablet (10 mg) by mouth daily. 90 tablet 3    olopatadine (PATANOL) 0.1 % ophthalmic solution [OLOPATADINE (PATANOL) 0.1 % OPHTHALMIC SOLUTION] APPLY ONE DROP TO EYE TWICE DAILY 5 mL 1         Review of Systems  Constitutional, HEENT, cardiovascular, pulmonary, gi and gu systems are negative, except as otherwise noted.     Objective    Exam  /69   Pulse 74   Temp 98.2  F (36.8  C)   Resp 16   Ht 1.575 m (5' 2\")   Wt 73.8 kg (162 lb 9.6 oz)   SpO2 98%   BMI " "29.74 kg/m     Estimated body mass index is 29.74 kg/m  as calculated from the following:    Height as of this encounter: 1.575 m (5' 2\").    Weight as of this encounter: 73.8 kg (162 lb 9.6 oz).    Physical Exam  GENERAL: alert and no distress  EYES: Eyes grossly normal to inspection, PERRL and conjunctivae and sclerae normal  HENT: ear canals and TM's normal, nose and mouth without ulcers or lesions  NECK: no adenopathy, no asymmetry, masses, or scars  RESP: lungs clear to auscultation - no rales, rhonchi or wheezes  BREAST: normal without masses, tenderness or nipple discharge and no palpable axillary masses or adenopathy  CV: regular rate and rhythm, normal S1 S2, no S3 or S4, no murmur, click or rub, no peripheral edema  ABDOMEN: soft, nontender, no hepatosplenomegaly, no masses and bowel sounds normal  MS: no gross musculoskeletal defects noted, no edema  SKIN: no suspicious lesions or rashes  NEURO: Normal strength and tone, mentation intact and speech normal  PSYCH: mentation appears normal, affect normal/bright        Signed Electronically by: Rebeka Bruno MD    "

## 2025-06-14 LAB — CCP AB SER IA-ACNC: 1.1 U/ML

## 2025-06-16 ENCOUNTER — RESULTS FOLLOW-UP (OUTPATIENT)
Dept: FAMILY MEDICINE | Facility: CLINIC | Age: 60
End: 2025-06-16